# Patient Record
Sex: MALE | Race: WHITE | NOT HISPANIC OR LATINO | Employment: OTHER | URBAN - METROPOLITAN AREA
[De-identification: names, ages, dates, MRNs, and addresses within clinical notes are randomized per-mention and may not be internally consistent; named-entity substitution may affect disease eponyms.]

---

## 2017-01-16 RX ORDER — LEVOTHYROXINE SODIUM 137 UG/1
TABLET ORAL
Qty: 90 TABLET | Refills: 1 | Status: SHIPPED | OUTPATIENT
Start: 2017-01-16 | End: 2017-07-07 | Stop reason: SDUPTHER

## 2017-01-17 DIAGNOSIS — E03.9 HYPOTHYROIDISM, UNSPECIFIED TYPE: ICD-10-CM

## 2017-01-17 DIAGNOSIS — E78.5 HYPERLIPIDEMIA, UNSPECIFIED HYPERLIPIDEMIA TYPE: Primary | ICD-10-CM

## 2017-01-18 ENCOUNTER — RESULTS ENCOUNTER (OUTPATIENT)
Dept: INTERNAL MEDICINE | Facility: CLINIC | Age: 72
End: 2017-01-18

## 2017-01-18 DIAGNOSIS — E78.5 HYPERLIPIDEMIA, UNSPECIFIED HYPERLIPIDEMIA TYPE: ICD-10-CM

## 2017-01-18 DIAGNOSIS — E03.9 HYPOTHYROIDISM, UNSPECIFIED TYPE: ICD-10-CM

## 2017-01-18 LAB
ALBUMIN SERPL-MCNC: 4.2 G/DL (ref 3.5–5.2)
ALBUMIN/GLOB SERPL: 1.8 G/DL
ALP SERPL-CCNC: 71 U/L (ref 39–117)
ALT SERPL-CCNC: 17 U/L (ref 1–41)
AST SERPL-CCNC: 13 U/L (ref 1–40)
BILIRUB SERPL-MCNC: 0.5 MG/DL (ref 0.1–1.2)
BUN SERPL-MCNC: 18 MG/DL (ref 8–23)
BUN/CREAT SERPL: 16.8 (ref 7–25)
CALCIUM SERPL-MCNC: 9.5 MG/DL (ref 8.6–10.5)
CHLORIDE SERPL-SCNC: 103 MMOL/L (ref 98–107)
CHOLEST SERPL-MCNC: 146 MG/DL (ref 0–200)
CO2 SERPL-SCNC: 26.9 MMOL/L (ref 22–29)
CREAT SERPL-MCNC: 1.07 MG/DL (ref 0.76–1.27)
GLOBULIN SER CALC-MCNC: 2.4 GM/DL
GLUCOSE SERPL-MCNC: 81 MG/DL (ref 65–99)
HDLC SERPL-MCNC: 55 MG/DL (ref 40–60)
LDLC SERPL CALC-MCNC: 68 MG/DL (ref 0–100)
POTASSIUM SERPL-SCNC: 4.6 MMOL/L (ref 3.5–5.2)
PROT SERPL-MCNC: 6.6 G/DL (ref 6–8.5)
SODIUM SERPL-SCNC: 143 MMOL/L (ref 136–145)
T4 FREE SERPL-MCNC: 1.19 NG/DL (ref 0.93–1.7)
TRIGL SERPL-MCNC: 113 MG/DL (ref 0–150)
TSH SERPL DL<=0.005 MIU/L-ACNC: 4.98 MIU/ML (ref 0.27–4.2)
VLDLC SERPL CALC-MCNC: 22.6 MG/DL (ref 5–40)

## 2017-01-25 ENCOUNTER — OFFICE VISIT (OUTPATIENT)
Dept: INTERNAL MEDICINE | Facility: CLINIC | Age: 72
End: 2017-01-25

## 2017-01-25 VITALS
OXYGEN SATURATION: 98 % | SYSTOLIC BLOOD PRESSURE: 138 MMHG | WEIGHT: 242 LBS | HEART RATE: 69 BPM | DIASTOLIC BLOOD PRESSURE: 88 MMHG | HEIGHT: 68 IN | BODY MASS INDEX: 36.68 KG/M2

## 2017-01-25 DIAGNOSIS — R09.89 LABILE HYPERTENSION: Primary | ICD-10-CM

## 2017-01-25 DIAGNOSIS — E78.00 PURE HYPERCHOLESTEROLEMIA: ICD-10-CM

## 2017-01-25 DIAGNOSIS — E03.9 ACQUIRED HYPOTHYROIDISM: ICD-10-CM

## 2017-01-25 DIAGNOSIS — E04.1 THYROID NODULE: ICD-10-CM

## 2017-01-25 PROCEDURE — 99214 OFFICE O/P EST MOD 30 MIN: CPT | Performed by: INTERNAL MEDICINE

## 2017-01-25 NOTE — MR AVS SNAPSHOT
Javier San   1/25/2017 1:00 PM   Office Visit    Dept Phone:  [phone]   Encounter #:  [id number]    Provider:  Lainey Gaffney MD   Department:  [facility] INTERNAL MEDICINE                Your Full Care Plan              Today's Medication Changes          These changes are accurate as of: 1/25/17  1:24 PM.  If you have any questions, ask your nurse or doctor.               Medication(s)that have changed:     levothyroxine 137 MCG tablet   Commonly known as:  SYNTHROID, LEVOTHROID   TAKE ONE TABLET BY MOUTH DAILY   What changed:  Another medication with the same name was removed. Continue taking this medication, and follow the directions you see here.                  Your Updated Medication List          This list is accurate as of: 1/25/17  1:24 PM.  Always use your most recent med list.                acetaminophen 325 MG tablet   Commonly known as:  TYLENOL       * albuterol 108 (90 BASE) MCG/ACT inhaler   Commonly known as:  PROVENTIL HFA;VENTOLIN HFA       * albuterol 1.25 MG/3ML nebulizer solution   Commonly known as:  ACCUNEB       atorvastatin 10 MG tablet   Commonly known as:  LIPITOR   Take 1 tablet by mouth Daily.       azelastine 0.1 % nasal spray   Commonly known as:  ASTELIN       doxycycline 100 MG tablet   Commonly known as:  VIBRAMYICN   Take 1 tablet by mouth 2 (Two) Times a Day.       fluticasone 50 MCG/ACT nasal spray   Commonly known as:  FLONASE       levothyroxine 137 MCG tablet   Commonly known as:  SYNTHROID, LEVOTHROID   TAKE ONE TABLET BY MOUTH DAILY       losartan 50 MG tablet   Commonly known as:  COZAAR       mometasone-formoterol 100-5 MCG/ACT inhaler   Commonly known as:  DULERA 100   Inhale 2 puffs 2 (Two) Times a Day.       montelukast 10 MG tablet   Commonly known as:  SINGULAIR       MULTIPLE VITAMINS PO       predniSONE 10 MG tablet   Commonly known as:  DELTASONE   6 tabs po qd x 3 days, 5 tabs po x 3d, 4 tabs po qd x 3d,  3 tabs po qd x 3 d, 2 tabs po qd x 3 days, 1 tab po qd x 3 days       sildenafil 25 MG tablet   Commonly known as:  VIAGRA       tiZANidine 4 MG tablet   Commonly known as:  ZANAFLEX       vitamin C 250 MG tablet   Commonly known as:  ASCORBIC ACID       * Notice:  This list has 2 medication(s) that are the same as other medications prescribed for you. Read the directions carefully, and ask your doctor or other care provider to review them with you.            You Were Diagnosed With        Codes Comments    Labile hypertension    -  Primary ICD-10-CM: I10  ICD-9-CM: 401.9     Pure hyperglyceridemia     ICD-10-CM: E78.1  ICD-9-CM: 272.1     Acquired hypothyroidism     ICD-10-CM: E03.9  ICD-9-CM: 244.9     Thyroid nodule     ICD-10-CM: E04.1  ICD-9-CM: 241.0       Instructions     None    Patient Instructions History      Upcoming Appointments     Visit Type Date Time Department    OFFICE VISIT 1/25/2017  1:00 PM [facility]    PACEMAKER ICD - IN OFFICE 2/8/2017 12:30 PM [facility]    FOLLOW UP 2/15/2017  1:30 PM [facility]    CT [facility] CHEST WO CONTRAST 3/6/2017 11:00 AM  [facility]    [facility] 4/20/2017 10:10 AM [facility]    OFFICE VISIT 4/25/2017  1:00 PM [facility]      MyChart Signup     Our records indicate that you have an active [employer] account.    You can view your After Visit Summary by going to [employer] and logging in with your [employer] username and password.  If you don't have a [employer] username and password but a parent or guardian has access to your record, the parent or guardian should login with their own [employer] username and password and access your record to view the After Visit Summary.    If you have questions, you can email [email or web address] or call [phone] to talk to our [employer] staff.  Remember, [employer] is NOT to be used for urgent needs.  For medical emergencies, dial 911.               Other Info from Your Visit     "       Your Appointments     Feb 08, 2017 12:30 PM EST   PACEMAKER IN OFFICE with GI VIEYRA [facility] CARDIOLOGY (--)    [address]  [facility] [address]   [phone]            Feb 15, 2017  1:30 PM EST   Follow Up with Jono Madden MD   [facility] CARDIOLOGY (--)    [address]  [facility] [id number] [phone]           Arrive 15 minutes prior to appointment.            Mar 06, 2017 11:00 AM EST   CT gary chest wo contrast with GARY [facility] CT 1   [facility]    [id number] [place] [id number] [phone]           Bring current list of meds Please arrive 15 minutes prior to exam            Apr 20, 2017 10:10 AM EDT   [facility] with [facility] INTERNAL MEDICINE (--)    [address]  [place] [facility] [address]   [phone]            Apr 25, 2017  1:00 PM EDT   Office Visit with Lainey Gaffney MD   [facility] INTERNAL MEDICINE (--)    [address]  [facility] [id number] [phone]           Arrive 15 minutes prior to appointment.              Allergies     No Known Allergies      Reason for Visit     Hypertension     Hyperlipidemia     Hypothyroidism           Vital Signs     Blood Pressure Pulse Height Weight Oxygen Saturation Body Mass Index    138/88 69 68\" (172.7 cm) 242 lb (110 kg) 98% 36.8 kg/m2    Smoking Status                   Former Smoker           Problems and Diagnoses Noted     High cholesterol or triglycerides    Underactive thyroid    Labile hypertension    Thyroid nodule        "

## 2017-01-25 NOTE — PROGRESS NOTES
Subjective     Javier San is a 71 y.o. male who presents with   Chief Complaint   Patient presents with   • Hypertension   • Hyperlipidemia   • Hypothyroidism       History of Present Illness     HTN. Fair but variable control.    HLD. Excellent control with atorvastatin.   Hypothyroidism.  Slight increase in TSH on levothyroxine 137.    Thyroid nodule.  Seen on CT chest.  Discussed checking US to evaluate.         Review of Systems   Respiratory: Positive for cough.    Cardiovascular: Negative for chest pain.       The following portions of the patient's history were reviewed and updated as appropriate: allergies, current medications and problem list.    Patient Active Problem List    Diagnosis Date Noted   • Urinary retention 12/07/2016   • Hyperlipemia 10/27/2016   • Thyroid nodule 10/26/2016     Note Last Updated: 10/27/2016     Noted on CT chest 3/2016.  Stable 9/2016     • AV block, Mobitz II 06/17/2016   • History of melanoma 04/15/2016     Note Last Updated: 4/15/2016     Times two.       • Atopic rhinitis 03/01/2016     Note Last Updated: 3/1/2016     Impression: 03/24/2014 - Refill Flonase and add OTC Clariten to his Singular.;      • Arthritis 03/01/2016   • Asthma 03/01/2016   • Chronic bronchitis 03/01/2016   • Degeneration of intervertebral disc of lumbar region 03/01/2016   • Labile hypertension 03/01/2016   • Hypothyroidism 03/01/2016     Note Last Updated: 3/1/2016     Impression: 03/24/2014 - Controlled with levothyroxine;      • Impaired fasting glucose 03/01/2016   • Lumbar radiculopathy 03/01/2016   • Spinal stenosis of lumbar region 03/01/2016   • Lung nodule 03/01/2016     Note Last Updated: 3/1/2016     Description: by CT of the chest 2/9/15, recheck 8/2015     • Obstructive sleep apnea syndrome 03/01/2016     Note Last Updated: 3/1/2016     Description: Patient is maintained on BIPAP.         Current Outpatient Prescriptions on File Prior to Visit   Medication Sig Dispense Refill   •  acetaminophen (TYLENOL) 325 MG tablet Take 650 mg by mouth Every 6 (Six) Hours As Needed for mild pain (1-3).     • albuterol (ACCUNEB) 1.25 MG/3ML nebulizer solution Take 1 ampule by nebulization Every 6 (Six) Hours As Needed for wheezing.     • albuterol (PROVENTIL HFA;VENTOLIN HFA) 108 (90 BASE) MCG/ACT inhaler Inhale 2 puffs Every 6 (Six) Hours As Needed. ProAir  (90 Base) MCG/ACT Inhalation Aerosol Solution; Patient Sig: ProAir  (90 Base) MCG/ACT Inhalation Aerosol Solution INHALE 1 TO 2 PUFFS EVERY 4 TO 6 HOURS AS NEEDED.; 0; 29-Sep-2014; Active     • atorvastatin (LIPITOR) 10 MG tablet Take 1 tablet by mouth Daily. (Patient taking differently: Take 10 mg by mouth Every Evening.) 90 tablet 3   • azelastine (ASTELIN) 0.1 % nasal spray 1 spray into each nostril 2 (Two) Times a Day. Use 1 to 2 sprays     • doxycycline (VIBRAMYICN) 100 MG tablet Take 1 tablet by mouth 2 (Two) Times a Day. 20 tablet 0   • fluticasone (FLONASE) 50 MCG/ACT nasal spray 2 sprays into each nostril daily.     • levothyroxine (SYNTHROID, LEVOTHROID) 137 MCG tablet TAKE ONE TABLET BY MOUTH DAILY 90 tablet 1   • losartan (COZAAR) 50 MG tablet Take 25 mg by mouth 2 (Two) Times a Day. Takes 1/2 50mg tab     • mometasone-formoterol (DULERA 100) 100-5 MCG/ACT inhaler Inhale 2 puffs 2 (Two) Times a Day.  11   • montelukast (SINGULAIR) 10 MG tablet Take 1 tablet by mouth daily.     • MULTIPLE VITAMINS PO Take 1 tablet/day by mouth Daily.     • predniSONE (DELTASONE) 10 MG tablet 6 tabs po qd x 3 days, 5 tabs po x 3d, 4 tabs po qd x 3d, 3 tabs po qd x 3 d, 2 tabs po qd x 3 days, 1 tab po qd x 3 days 63 tablet 0   • sildenafil (VIAGRA) 25 MG tablet Take 25 mg by mouth daily as needed for erectile dysfunction.     • tiZANidine (ZANAFLEX) 4 MG tablet Take 4 mg by mouth Every Evening.     • vitamin C (ASCORBIC ACID) 250 MG tablet Take 250 mg by mouth daily.     • [DISCONTINUED] levothyroxine (SYNTHROID, LEVOTHROID) 137 MCG tablet Take  "137 mcg by mouth Every Morning.       No current facility-administered medications on file prior to visit.        Objective     Visit Vitals   • /88   • Pulse 69   • Ht 68\" (172.7 cm)   • Wt 242 lb (110 kg)   • SpO2 98%   • BMI 36.8 kg/m2       Physical Exam   Constitutional: He is oriented to person, place, and time. He appears well-developed and well-nourished.   HENT:   Head: Atraumatic.   Cardiovascular: Normal rate, regular rhythm and normal heart sounds.    Pulmonary/Chest: Effort normal and breath sounds normal.   Neurological: He is alert and oriented to person, place, and time.   Skin: Skin is warm and dry.   Psychiatric: He has a normal mood and affect.       Assessment/Plan   Javier was seen today for hypertension, hyperlipidemia and hypothyroidism.    Diagnoses and all orders for this visit:    Labile hypertension    Pure hyperglyceridemia    Acquired hypothyroidism    Thyroid nodule  -     US Thyroid; Future        Discussion  HTN. Continue current regimen.  HLD. Continue atorvastatin.   Hypothyroidism. Continue levothyroxine at current dose.   Thyroid nodule.  Check US to follow on incidentally found nodule on CT.         Future Appointments  Date Time Provider Department Center   2/8/2017 12:30 PM GI VIEYRA [facility] None   2/15/2017 1:30 PM Jono Madden MD [facility] None   3/6/2017 11:00 AM [facility] CT 1  [facility] CT [facility] [place]   4/20/2017 10:10 AM [facility] None   4/25/2017 1:00 PM Lainey Gaffney MD [facility] None         "

## 2017-01-30 RX ORDER — LOSARTAN POTASSIUM 50 MG/1
TABLET ORAL
Qty: 90 TABLET | Refills: 1 | Status: SHIPPED | OUTPATIENT
Start: 2017-01-30 | End: 2017-02-15 | Stop reason: ALTCHOICE

## 2017-02-01 ENCOUNTER — HOSPITAL ENCOUNTER (OUTPATIENT)
Dept: ULTRASOUND IMAGING | Facility: HOSPITAL | Age: 72
Discharge: HOME | End: 2017-02-01
Admitting: INTERNAL MEDICINE

## 2017-02-01 DIAGNOSIS — E04.1 THYROID NODULE: ICD-10-CM

## 2017-02-01 PROCEDURE — 76536 US EXAM OF HEAD AND NECK: CPT

## 2017-02-03 DIAGNOSIS — E04.1 THYROID NODULE: Primary | ICD-10-CM

## 2017-02-08 ENCOUNTER — CLINICAL SUPPORT NO REQUIREMENTS (OUTPATIENT)
Dept: CARDIOLOGY | Facility: CLINIC | Age: 72
End: 2017-02-08

## 2017-02-08 DIAGNOSIS — I44.2 AV BLOCK, 3RD DEGREE (HCC): Primary | ICD-10-CM

## 2017-02-08 PROCEDURE — 93280 PM DEVICE PROGR EVAL DUAL: CPT | Performed by: INTERNAL MEDICINE

## 2017-02-15 ENCOUNTER — OFFICE VISIT (OUTPATIENT)
Dept: CARDIOLOGY | Facility: CLINIC | Age: 72
End: 2017-02-15

## 2017-02-15 VITALS
WEIGHT: 241 LBS | DIASTOLIC BLOOD PRESSURE: 76 MMHG | BODY MASS INDEX: 36.53 KG/M2 | SYSTOLIC BLOOD PRESSURE: 110 MMHG | HEIGHT: 68 IN | HEART RATE: 60 BPM

## 2017-02-15 DIAGNOSIS — R09.89 LABILE HYPERTENSION: ICD-10-CM

## 2017-02-15 DIAGNOSIS — I44.1 AV BLOCK, MOBITZ II: Primary | ICD-10-CM

## 2017-02-15 PROCEDURE — 99213 OFFICE O/P EST LOW 20 MIN: CPT | Performed by: INTERNAL MEDICINE

## 2017-02-15 NOTE — PROGRESS NOTES
Date of Office Visit: 02/15/2017  Encounter Provider: Jono Madden MD  Place of Service: [facility] CARDIOLOGY  Patient Name: Javier San  :1945    Chief Complaint   Patient presents with   • Hypertension   :     HPI: Javier San is a 71 y.o. male who presents today to follow up. He has a history of near syncope and labile hypertension with orthostasis.  In 2016, he presented to the ER with bradycardia/Mobitz II heart block, and had a dual chamber [employer] pacemaker placed. He has not had syncope, although he does still have intermittent lightheadedness (extremely brief, seconds long episodes). He also has very labile blood pressures, which can be high or low.       He may need an implanted pain pump and is wondering if it will interfere with his pacer.    Past Medical History   Diagnosis Date   • Abnormal blood chemistry    • Abnormal liver enzymes    • Acute bacterial prostatitis    • Anemia    • Anxiety 2014     Warning,Lainey   • Arthritis    • Asthma 3/1/2016   • Basal cell carcinoma    • Chronic bronchitis 3/1/2016   • Gynecomastia    • H/O degenerative disc disease    • H/O hernia repair    • Hypertension 3/1/2016     Impression: 2015 - .  Impression: 2014 - Controlled with HCTZ.;    • Hypokalemia    • Hypothyroidism    • Labile hypertension 2016     with periods of orthostasis and near syncope   • Left bundle branch block    • Low back pain    • Lumbar radiculopathy    • Lumbar stenosis    • Lung nodule      CT of chest 2/9/15   • Malignant neoplasm of cheek 3/1/2016     Description: - Basal Cell   • Mobitz (type) II atrioventricular block      s/p [employer] dual chamber PPM    • Obstructive sleep apnea    • Rupture of kidney      in high school football   • Skin cancer of nose      Basal Cell   • Stricture of ureter      WITH HYDRONEPHROSIS   • Testicular pain    • Venous insufficiency    • Vitamin D deficiency         Past Surgical History   Procedure Laterality Date   • Back surgery       L4-L5 with Dr. Solomon   • Colon surgery       Complete colonoscopy- Dr. knox/DAVID   • Hernia repair     • Replacement total knee bilateral       right in 2008 and left in 2005   • Nasal septum surgery     • Nose surgery       deviated septum   • Total shoulder replacement Bilateral      left in 2011 and right in 2013   • Prostate surgery       transurethral destruction prostate tissue   • Tonsillectomy     • Cystoscopy transurethral resection of prostate     • Cardiac electrophysiology procedure N/A 6/17/2016     Procedure: Pacemaker DC new  [place];  Surgeon: Zachary Narayanan MD;  Location: [facility] INVASIVE LOCATION;  Service:    • Cystoscopy transurethral resection of prostate N/A 12/7/2016     Procedure: CYSTOSCOPY TRANSURETHRAL RESECTION OF PROSTATE;  Surgeon: Law Dee MD;  Location: [facility] OR;  Service:        Social History     Social History   • Marital status:      Spouse name: N/A   • Number of children: N/A   • Years of education: N/A     Occupational History   • Not on file.     Social History Main Topics   • Smoking status: Former Smoker     Packs/day: 1.00     Types: Cigarettes     Start date: 6/16/1964     Quit date: 6/16/1984   • Smokeless tobacco: Never Used      Comment: 20 year smoker   • Alcohol use Yes      Comment: social, 4 beers a year   • Drug use: No   • Sexual activity: Defer     Other Topics Concern   • Not on file     Social History Narrative       Family History   Problem Relation Age of Onset   • Alcohol abuse Mother    • Cancer Mother      skin   • Stroke Mother    • Alcohol abuse Father    • Emphysema Father    • COPD Father    • Cancer Father      skin       Review of Systems   Cardiovascular: Positive for leg swelling.   Musculoskeletal: Positive for back pain.   All other systems reviewed and are negative.      No Known Allergies      Current Outpatient Prescriptions:   •   "acetaminophen (TYLENOL) 325 MG tablet, Take 650 mg by mouth Every 6 (Six) Hours As Needed for mild pain (1-3)., Disp: , Rfl:   •  albuterol (ACCUNEB) 1.25 MG/3ML nebulizer solution, Take 1 ampule by nebulization Every 6 (Six) Hours As Needed for wheezing., Disp: , Rfl:   •  albuterol (PROVENTIL HFA;VENTOLIN HFA) 108 (90 BASE) MCG/ACT inhaler, Inhale 2 puffs Every 6 (Six) Hours As Needed. ProAir  (90 Base) MCG/ACT Inhalation Aerosol Solution; Patient Sig: ProAir  (90 Base) MCG/ACT Inhalation Aerosol Solution INHALE 1 TO 2 PUFFS EVERY 4 TO 6 HOURS AS NEEDED.; 0; 29-Sep-2014; Active, Disp: , Rfl:   •  atorvastatin (LIPITOR) 10 MG tablet, Take 1 tablet by mouth Daily. (Patient taking differently: Take 10 mg by mouth Every Evening.), Disp: 90 tablet, Rfl: 3  •  azelastine (ASTELIN) 0.1 % nasal spray, 1 spray into each nostril 2 (Two) Times a Day. Use 1 to 2 sprays, Disp: , Rfl:   •  fluticasone (FLONASE) 50 MCG/ACT nasal spray, 2 sprays into each nostril daily., Disp: , Rfl:   •  Fluticasone Furoate-Vilanterol (BREO ELLIPTA IN), Inhale., Disp: , Rfl:   •  levothyroxine (SYNTHROID, LEVOTHROID) 137 MCG tablet, TAKE ONE TABLET BY MOUTH DAILY, Disp: 90 tablet, Rfl: 1  •  losartan (COZAAR) 50 MG tablet, Take 25 mg by mouth 2 (Two) Times a Day. Takes 1/2 50mg tab, Disp: , Rfl:   •  montelukast (SINGULAIR) 10 MG tablet, Take 1 tablet by mouth daily., Disp: , Rfl:   •  MULTIPLE VITAMINS PO, Take 1 tablet/day by mouth Daily., Disp: , Rfl:   •  sildenafil (VIAGRA) 25 MG tablet, Take 25 mg by mouth daily as needed for erectile dysfunction., Disp: , Rfl:   •  tiZANidine (ZANAFLEX) 4 MG tablet, Take 4 mg by mouth Every Evening., Disp: , Rfl:   •  vitamin C (ASCORBIC ACID) 250 MG tablet, Take 250 mg by mouth daily., Disp: , Rfl:      Objective:     Vitals:    02/15/17 1346   BP: 110/76   Pulse: 60   Weight: 241 lb (109 kg)   Height: 68\" (172.7 cm)     Body mass index is 36.64 kg/(m^2).    Physical Exam   Constitutional: " He is oriented to person, place, and time.   Obese   HENT:   Head: Normocephalic.   Nose: Nose normal.   Mouth/Throat: Oropharynx is clear and moist.   Eyes: Conjunctivae and EOM are normal. Pupils are equal, round, and reactive to light.   Neck: Normal range of motion.   Cannot assess for JVD due to habitus   Cardiovascular: Normal rate, regular rhythm, normal heart sounds and intact distal pulses.    No murmur heard.  Pulmonary/Chest: Effort normal.   Abdominal: Soft.   Obesity limits abdominal exam   Musculoskeletal: Normal range of motion. He exhibits no edema.   Neurological: He is alert and oriented to person, place, and time. No cranial nerve deficit.   Skin: Skin is warm and dry. No rash noted.   Psychiatric: He has a normal mood and affect. His behavior is normal. Judgment and thought content normal.   Vitals reviewed.      Procedures      Assessment:       Diagnosis Plan   1. AV block, Mobitz II     2. Labile hypertension            Plan:       1.  He has a history of high grade AVB and is now s/p dual chamber PPM placement.  He paces in the LV 90% of the time.  He is doing well.   I asked him to see which company manufactures the planned pain pump and we'll make sure there's no interaction with his device.    2.  He has hypertension with lability.  His BP is perfect today.  He hasn't had syncope so we'll keep things where they are for now.      Sincerely,       Jono Madden MD

## 2017-03-06 ENCOUNTER — HOSPITAL ENCOUNTER (OUTPATIENT)
Dept: CT IMAGING | Facility: HOSPITAL | Age: 72
Discharge: HOME | End: 2017-03-06
Attending: INTERNAL MEDICINE | Admitting: INTERNAL MEDICINE

## 2017-03-06 DIAGNOSIS — R91.8 PULMONARY NODULES: ICD-10-CM

## 2017-03-06 PROCEDURE — 71250 CT THORAX DX C-: CPT

## 2017-03-07 ENCOUNTER — OFFICE VISIT (OUTPATIENT)
Dept: INTERNAL MEDICINE | Facility: CLINIC | Age: 72
End: 2017-03-07

## 2017-03-07 VITALS
WEIGHT: 239 LBS | HEIGHT: 68 IN | SYSTOLIC BLOOD PRESSURE: 110 MMHG | TEMPERATURE: 97.8 F | BODY MASS INDEX: 36.22 KG/M2 | HEART RATE: 70 BPM | DIASTOLIC BLOOD PRESSURE: 80 MMHG | OXYGEN SATURATION: 98 %

## 2017-03-07 DIAGNOSIS — J01.90 ACUTE SINUSITIS, RECURRENCE NOT SPECIFIED, UNSPECIFIED LOCATION: Primary | ICD-10-CM

## 2017-03-07 DIAGNOSIS — J42 CHRONIC BRONCHITIS, UNSPECIFIED CHRONIC BRONCHITIS TYPE (HCC): ICD-10-CM

## 2017-03-07 DIAGNOSIS — J45.20 MILD INTERMITTENT ASTHMA WITHOUT COMPLICATION: ICD-10-CM

## 2017-03-07 PROCEDURE — 99214 OFFICE O/P EST MOD 30 MIN: CPT | Performed by: INTERNAL MEDICINE

## 2017-03-07 RX ORDER — AMOXICILLIN 500 MG/1
CAPSULE ORAL
COMMUNITY
Start: 2017-03-04 | End: 2017-03-07

## 2017-03-07 RX ORDER — LEVOFLOXACIN 500 MG/1
500 TABLET, FILM COATED ORAL DAILY
Qty: 10 TABLET | Refills: 0 | Status: SHIPPED | OUTPATIENT
Start: 2017-03-07 | End: 2017-04-25

## 2017-03-07 NOTE — PROGRESS NOTES
Subjective     Javier San is a 71 y.o. male who presents with   Chief Complaint   Patient presents with   • Conjunctivitis     Follow up [facility]   • Nasal Congestion   • Sore Throat       Conjunctivitis    Associated symptoms include congestion, ear pain, sore throat and cough. Pertinent negatives include no abdominal pain, no diarrhea, no vomiting, no ear discharge and no headaches.   Sore Throat    This is a new problem. The current episode started in the past 7 days. The problem has been gradually worsening. Neither side of throat is experiencing more pain than the other. There has been no fever. The fever has been present for 3 to 4 days. The pain is at a severity of 3/10. Associated symptoms include congestion, coughing, ear pain, a hoarse voice, a plugged ear sensation and trouble swallowing. Pertinent negatives include no abdominal pain, diarrhea, drooling, ear discharge, headaches, shortness of breath or vomiting. He has had no exposure to strep or mono.        Started with sore throat last last week.  Eye drainage started Friday.  Ear pain started on Saturday.  Cough is constant since November and never resolves. Doxycycline from allergist in January.  Due to see pulmonologist this month.  Saw ENT as well and plans to do a CT.  Moderate severity of problem which is progressive.      Review of Systems   HENT: Positive for congestion, ear pain, hoarse voice, sore throat and trouble swallowing. Negative for drooling and ear discharge.    Respiratory: Positive for cough. Negative for shortness of breath.    Gastrointestinal: Negative for abdominal pain, diarrhea and vomiting.   Neurological: Negative for headaches.       The following portions of the patient's history were reviewed and updated as appropriate: allergies, current medications and problem list.    Patient Active Problem List    Diagnosis Date Noted   • Acute sinus infection 03/07/2017   • Urinary retention 12/07/2016   • Hyperlipemia 10/27/2016    • Thyroid nodule 10/26/2016     Note Last Updated: 10/27/2016     Noted on CT chest 3/2016.  Stable 9/2016     • AV block, Mobitz II 06/17/2016   • History of melanoma 04/15/2016     Note Last Updated: 4/15/2016     Times two.       • Atopic rhinitis 03/01/2016     Note Last Updated: 3/1/2016     Impression: 03/24/2014 - Refill Flonase and add OTC Clariten to his Singular.;      • Arthritis 03/01/2016   • Asthma 03/01/2016   • Chronic bronchitis 03/01/2016   • Degeneration of intervertebral disc of lumbar region 03/01/2016   • Labile hypertension 03/01/2016   • Hypothyroidism 03/01/2016     Note Last Updated: 3/1/2016     Impression: 03/24/2014 - Controlled with levothyroxine;      • Impaired fasting glucose 03/01/2016   • Lumbar radiculopathy 03/01/2016   • Spinal stenosis of lumbar region 03/01/2016   • Lung nodule 03/01/2016     Note Last Updated: 3/1/2016     Description: by CT of the chest 2/9/15, recheck 8/2015     • Obstructive sleep apnea syndrome 03/01/2016     Note Last Updated: 3/1/2016     Description: Patient is maintained on BIPAP.         Current Outpatient Prescriptions on File Prior to Visit   Medication Sig Dispense Refill   • acetaminophen (TYLENOL) 325 MG tablet Take 650 mg by mouth Every 6 (Six) Hours As Needed for mild pain (1-3).     • albuterol (ACCUNEB) 1.25 MG/3ML nebulizer solution Take 1 ampule by nebulization Every 6 (Six) Hours As Needed for wheezing.     • albuterol (PROVENTIL HFA;VENTOLIN HFA) 108 (90 BASE) MCG/ACT inhaler Inhale 2 puffs Every 6 (Six) Hours As Needed. ProAir  (90 Base) MCG/ACT Inhalation Aerosol Solution; Patient Sig: ProAir  (90 Base) MCG/ACT Inhalation Aerosol Solution INHALE 1 TO 2 PUFFS EVERY 4 TO 6 HOURS AS NEEDED.; 0; 29-Sep-2014; Active     • amoxicillin (AMOXIL) 500 MG tablet Take 1 tablet by mouth 2 (Two) Times a Day for 7 days. 14 tablet 0   • atorvastatin (LIPITOR) 10 MG tablet Take 1 tablet by mouth Daily. (Patient taking differently: Take  "10 mg by mouth Every Evening.) 90 tablet 3   • azelastine (ASTELIN) 0.1 % nasal spray 1 spray into each nostril 2 (Two) Times a Day. Use 1 to 2 sprays     • fluticasone (FLONASE) 50 MCG/ACT nasal spray 2 sprays into each nostril daily.     • Fluticasone Furoate-Vilanterol (BREO ELLIPTA IN) Inhale.     • levothyroxine (SYNTHROID, LEVOTHROID) 137 MCG tablet TAKE ONE TABLET BY MOUTH DAILY 90 tablet 1   • losartan (COZAAR) 50 MG tablet Take 25 mg by mouth 2 (Two) Times a Day. Takes 1/2 50mg tab     • montelukast (SINGULAIR) 10 MG tablet Take 1 tablet by mouth daily.     • MULTIPLE VITAMINS PO Take 1 tablet/day by mouth Daily.     • sildenafil (VIAGRA) 25 MG tablet Take 25 mg by mouth daily as needed for erectile dysfunction.     • tiZANidine (ZANAFLEX) 4 MG tablet Take 4 mg by mouth Every Evening.     • trimethoprim-polymyxin b (POLYTRIM) 08853-1.1 UNIT/ML-% ophthalmic solution Administer 1 drop into the left eye Every 6 (Six) Hours for 3 days. 10 mL 0   • vitamin C (ASCORBIC ACID) 250 MG tablet Take 250 mg by mouth daily.       No current facility-administered medications on file prior to visit.        Objective     Visit Vitals   • /80   • Pulse 70   • Temp 97.8 °F (36.6 °C)   • Ht 68\" (172.7 cm)   • Wt 239 lb (108 kg)   • SpO2 98%   • BMI 36.34 kg/m2       Physical Exam   Constitutional: He is oriented to person, place, and time. He appears well-developed and well-nourished.   HENT:   Head: Atraumatic.   Right Ear: Hearing normal.   Left Ear: Hearing normal.   Mouth/Throat: No oropharyngeal exudate or posterior oropharyngeal erythema.   Bilateral TMs are bulging   Cardiovascular: Normal rate, regular rhythm and normal heart sounds.    Pulmonary/Chest: Effort normal and breath sounds normal.   Neurological: He is alert and oriented to person, place, and time.   Skin: Skin is warm and dry.   Psychiatric: He has a normal mood and affect.       Assessment/Plan   Javier was seen today for conjunctivitis, nasal " congestion and sore throat.    Diagnoses and all orders for this visit:    Acute sinusitis, recurrence not specified, unspecified location    Mild intermittent asthma without complication    Chronic bronchitis, unspecified chronic bronchitis type    Other orders  -     levoFLOXacin (LEVAQUIN) 500 MG tablet; Take 1 tablet by mouth Daily.        Discussion  Patient with chronic bronchitis and asthma presents with acute sinus infection.  Rx for antibiotics are called in.  The patient is encouraged to take with OTC Mucinex DM.  Levaquin chosen because she is a amoxil failure.  Let me know if not feeling better over the next 3 days or if there is any change in symptoms.  He has f/u with Dr. Garnica who may want to consider bronchoscopy.  Dr. Hernandez has ordered a CT of the sinuses.             Future Appointments  Date Time Provider Department Center   4/20/2017 10:10 AM [facility] None   4/25/2017 1:00 PM MD CHANTAL Esparza [facility] None   5/30/2017 12:10 AM [facility], [facility] None   2/15/2018 10:45 AM MD CHANTAL Diaz [facility] None

## 2017-04-03 ENCOUNTER — TELEPHONE (OUTPATIENT)
Dept: CARDIOLOGY | Facility: CLINIC | Age: 72
End: 2017-04-03

## 2017-04-03 NOTE — TELEPHONE ENCOUNTER
Date of Encounter:   4/3/2017  Encounter Provider:  Jono Madden MD  Place of Service:  [facility]  Patient Name: Javier San  :  1945        Attention:  Roxane      Dear Dr. Tavarez,    Mr. San has a history of heart block and has a dual chamber pacemaker ([employer]).  He does not have a history of CAD or CHF.  He has normal systolic function.  He has labile hypertension.  He is at low risk of major adverse cardiovascular events during intermediate risk surgery.     Please contact our office with any questions or concerns. As always, it has been a pleasure to participate in your patient's care.    It is unclear to me if there is any adverse interaction between the [employer] stimulator and a [employer] device.  If there were to be a serious interaction, we could always change his generator to a [employer] generator if needed.      Jono Madden MD  [facility]

## 2017-04-03 NOTE — TELEPHONE ENCOUNTER
Roxane from [facility] called and requested a cardiac clearance from Dr. Madden for a spinal cord stimulator placement. Placement is scheduled for April 24th.     The stimulator is manufactured by [employer]. The implanting physician is Dr. Julius Tavarez.     Pt has a [employer] device and I provided Roxane with the contact information so a BS rep could be present for the procedure, if cleared.     Roxane can be reached at [phone] and her direct fax is [phone]

## 2017-04-19 DIAGNOSIS — E03.9 ACQUIRED HYPOTHYROIDISM: Primary | ICD-10-CM

## 2017-04-19 DIAGNOSIS — E78.00 PURE HYPERCHOLESTEROLEMIA: ICD-10-CM

## 2017-04-20 LAB
ALBUMIN SERPL-MCNC: 4.4 G/DL (ref 3.5–5.2)
ALBUMIN/GLOB SERPL: 2.1 G/DL
ALP SERPL-CCNC: 74 U/L (ref 39–117)
ALT SERPL-CCNC: 26 U/L (ref 1–41)
AST SERPL-CCNC: 18 U/L (ref 1–40)
BASOPHILS # BLD AUTO: 0.03 10*3/MM3 (ref 0–0.2)
BASOPHILS NFR BLD AUTO: 0.5 % (ref 0–1.5)
BILIRUB SERPL-MCNC: 0.6 MG/DL (ref 0.1–1.2)
BUN SERPL-MCNC: 19 MG/DL (ref 8–23)
BUN/CREAT SERPL: 20.4 (ref 7–25)
CALCIUM SERPL-MCNC: 9.7 MG/DL (ref 8.6–10.5)
CHLORIDE SERPL-SCNC: 105 MMOL/L (ref 98–107)
CHOLEST SERPL-MCNC: 162 MG/DL (ref 0–200)
CO2 SERPL-SCNC: 26 MMOL/L (ref 22–29)
CREAT SERPL-MCNC: 0.93 MG/DL (ref 0.76–1.27)
EOSINOPHIL # BLD AUTO: 0.17 10*3/MM3 (ref 0–0.7)
EOSINOPHIL NFR BLD AUTO: 3 % (ref 0.3–6.2)
ERYTHROCYTE [DISTWIDTH] IN BLOOD BY AUTOMATED COUNT: 14.2 % (ref 11.5–14.5)
GLOBULIN SER CALC-MCNC: 2.1 GM/DL
GLUCOSE SERPL-MCNC: 90 MG/DL (ref 65–99)
HCT VFR BLD AUTO: 45.4 % (ref 40.4–52.2)
HDLC SERPL-MCNC: 61 MG/DL (ref 40–60)
HGB BLD-MCNC: 15.1 G/DL (ref 13.7–17.6)
IMM GRANULOCYTES # BLD: 0 10*3/MM3 (ref 0–0.03)
IMM GRANULOCYTES NFR BLD: 0 % (ref 0–0.5)
LDLC SERPL CALC-MCNC: 78 MG/DL (ref 0–100)
LYMPHOCYTES # BLD AUTO: 1.53 10*3/MM3 (ref 0.9–4.8)
LYMPHOCYTES NFR BLD AUTO: 26.7 % (ref 19.6–45.3)
MCH RBC QN AUTO: 31.6 PG (ref 27–32.7)
MCHC RBC AUTO-ENTMCNC: 33.3 G/DL (ref 32.6–36.4)
MCV RBC AUTO: 95 FL (ref 79.8–96.2)
MONOCYTES # BLD AUTO: 0.62 10*3/MM3 (ref 0.2–1.2)
MONOCYTES NFR BLD AUTO: 10.8 % (ref 5–12)
NEUTROPHILS # BLD AUTO: 3.37 10*3/MM3 (ref 1.9–8.1)
NEUTROPHILS NFR BLD AUTO: 59 % (ref 42.7–76)
PLATELET # BLD AUTO: 230 10*3/MM3 (ref 140–500)
POTASSIUM SERPL-SCNC: 4.6 MMOL/L (ref 3.5–5.2)
PROT SERPL-MCNC: 6.5 G/DL (ref 6–8.5)
RBC # BLD AUTO: 4.78 10*6/MM3 (ref 4.6–6)
SODIUM SERPL-SCNC: 145 MMOL/L (ref 136–145)
TRIGL SERPL-MCNC: 114 MG/DL (ref 0–150)
TSH SERPL DL<=0.005 MIU/L-ACNC: 1.78 MIU/ML (ref 0.27–4.2)
VLDLC SERPL CALC-MCNC: 22.8 MG/DL (ref 5–40)
WBC # BLD AUTO: 5.72 10*3/MM3 (ref 4.5–10.7)

## 2017-04-25 ENCOUNTER — OFFICE VISIT (OUTPATIENT)
Dept: INTERNAL MEDICINE | Facility: CLINIC | Age: 72
End: 2017-04-25

## 2017-04-25 VITALS
DIASTOLIC BLOOD PRESSURE: 88 MMHG | BODY MASS INDEX: 37.13 KG/M2 | OXYGEN SATURATION: 97 % | WEIGHT: 245 LBS | HEIGHT: 68 IN | SYSTOLIC BLOOD PRESSURE: 130 MMHG | HEART RATE: 72 BPM

## 2017-04-25 DIAGNOSIS — E03.9 ACQUIRED HYPOTHYROIDISM: ICD-10-CM

## 2017-04-25 DIAGNOSIS — E78.00 PURE HYPERCHOLESTEROLEMIA: ICD-10-CM

## 2017-04-25 DIAGNOSIS — I10 ESSENTIAL HYPERTENSION: Primary | ICD-10-CM

## 2017-04-25 PROCEDURE — 99214 OFFICE O/P EST MOD 30 MIN: CPT | Performed by: INTERNAL MEDICINE

## 2017-04-25 NOTE — PROGRESS NOTES
Subjective     Javier San is a 72 y.o. male who presents with   Chief Complaint   Patient presents with   • Hypertension   • Hyperlipidemia   • Hypothyroidism       History of Present Illness     HTN. Good control with current regimen.    HLD. Excellent control with atorvastatin.   Hypothyroidism. Well controlled with levothyroxine 137.      Review of Systems   Respiratory: Negative.    Cardiovascular: Negative.        The following portions of the patient's history were reviewed and updated as appropriate: allergies, current medications and problem list.    Patient Active Problem List    Diagnosis Date Noted   • Acute sinus infection 03/07/2017   • Urinary retention 12/07/2016   • Hyperlipemia 10/27/2016   • Thyroid nodule 10/26/2016     Note Last Updated: 10/27/2016     Noted on CT chest 3/2016.  Stable 9/2016     • AV block, Mobitz II 06/17/2016   • History of melanoma 04/15/2016     Note Last Updated: 4/15/2016     Times two.       • Atopic rhinitis 03/01/2016     Note Last Updated: 3/1/2016     Impression: 03/24/2014 - Refill Flonase and add OTC Clariten to his Singular.;      • Arthritis 03/01/2016   • Asthma 03/01/2016   • Chronic bronchitis 03/01/2016   • Degeneration of intervertebral disc of lumbar region 03/01/2016   • Essential hypertension 03/01/2016   • Hypothyroidism 03/01/2016     Note Last Updated: 3/1/2016     Impression: 03/24/2014 - Controlled with levothyroxine;      • Impaired fasting glucose 03/01/2016   • Lumbar radiculopathy 03/01/2016   • Spinal stenosis of lumbar region 03/01/2016   • Lung nodule 03/01/2016     Note Last Updated: 3/1/2016     Description: by CT of the chest 2/9/15, recheck 8/2015     • Obstructive sleep apnea syndrome 03/01/2016     Note Last Updated: 3/1/2016     Description: Patient is maintained on BIPAP.         Current Outpatient Prescriptions on File Prior to Visit   Medication Sig Dispense Refill   • acetaminophen (TYLENOL) 325 MG tablet Take 650 mg by mouth  "Every 6 (Six) Hours As Needed for mild pain (1-3).     • albuterol (ACCUNEB) 1.25 MG/3ML nebulizer solution Take 1 ampule by nebulization Every 6 (Six) Hours As Needed for wheezing.     • albuterol (PROVENTIL HFA;VENTOLIN HFA) 108 (90 BASE) MCG/ACT inhaler Inhale 2 puffs Every 6 (Six) Hours As Needed. ProAir  (90 Base) MCG/ACT Inhalation Aerosol Solution; Patient Sig: ProAir  (90 Base) MCG/ACT Inhalation Aerosol Solution INHALE 1 TO 2 PUFFS EVERY 4 TO 6 HOURS AS NEEDED.; 0; 29-Sep-2014; Active     • atorvastatin (LIPITOR) 10 MG tablet Take 1 tablet by mouth Daily. (Patient taking differently: Take 10 mg by mouth Every Evening.) 90 tablet 3   • azelastine (ASTELIN) 0.1 % nasal spray 1 spray into each nostril 2 (Two) Times a Day. Use 1 to 2 sprays     • fluticasone (FLONASE) 50 MCG/ACT nasal spray 2 sprays into each nostril daily.     • Fluticasone Furoate-Vilanterol (BREO ELLIPTA IN) Inhale.     • levothyroxine (SYNTHROID, LEVOTHROID) 137 MCG tablet TAKE ONE TABLET BY MOUTH DAILY 90 tablet 1   • losartan (COZAAR) 50 MG tablet Take 25 mg by mouth 2 (Two) Times a Day. Takes 1/2 50mg tab     • montelukast (SINGULAIR) 10 MG tablet Take 1 tablet by mouth daily.     • MULTIPLE VITAMINS PO Take 1 tablet/day by mouth Daily.     • sildenafil (VIAGRA) 25 MG tablet Take 25 mg by mouth daily as needed for erectile dysfunction.     • tiZANidine (ZANAFLEX) 4 MG tablet Take 4 mg by mouth Every Evening.     • vitamin C (ASCORBIC ACID) 250 MG tablet Take 250 mg by mouth daily.     • [DISCONTINUED] levoFLOXacin (LEVAQUIN) 500 MG tablet Take 1 tablet by mouth Daily. 10 tablet 0     No current facility-administered medications on file prior to visit.        Objective     /88  Pulse 72  Ht 68\" (172.7 cm)  Wt 245 lb (111 kg)  SpO2 97%  BMI 37.25 kg/m2    Physical Exam   Constitutional: He is oriented to person, place, and time. He appears well-developed and well-nourished.   HENT:   Head: Atraumatic. "   Cardiovascular: Normal rate, regular rhythm and normal heart sounds.    Pulmonary/Chest: Effort normal and breath sounds normal.   Neurological: He is alert and oriented to person, place, and time.   Skin: Skin is warm and dry.   Psychiatric: He has a normal mood and affect.       Assessment/Plan   Javier was seen today for hypertension, hyperlipidemia and hypothyroidism.    Diagnoses and all orders for this visit:    Essential hypertension    Pure hypercholesterolemia    Acquired hypothyroidism        Discussion    HTN.  Continue current regimen.  HLD.  Continue atorvastatin.   Hypothyroidism.  Continue levothyroxine.         Future Appointments  Date Time Provider Department Center   5/30/2017 12:10 AM [facility] None   10/23/2017 10:20 AM [facility] None   10/30/2017 1:30 PM MD [facility] None   2/15/2018 10:45 AM Jono Madden MD [facility] None

## 2017-05-26 ENCOUNTER — TRANSCRIBE ORDERS (OUTPATIENT)
Dept: ADMINISTRATIVE | Facility: HOSPITAL | Age: 72
End: 2017-05-26

## 2017-05-26 DIAGNOSIS — R91.1 PULMONARY NODULE: Primary | ICD-10-CM

## 2017-06-04 ENCOUNTER — CLINICAL SUPPORT NO REQUIREMENTS (OUTPATIENT)
Dept: CARDIOLOGY | Facility: CLINIC | Age: 72
End: 2017-06-04

## 2017-06-04 DIAGNOSIS — I44.2 ATRIOVENTRICULAR BLOCK, COMPLETE (HCC): Primary | ICD-10-CM

## 2017-06-04 PROCEDURE — 93294 REM INTERROG EVL PM/LDLS PM: CPT | Performed by: INTERNAL MEDICINE

## 2017-06-04 PROCEDURE — 93296 REM INTERROG EVL PM/IDS: CPT | Performed by: INTERNAL MEDICINE

## 2017-07-07 RX ORDER — LEVOTHYROXINE SODIUM 137 UG/1
TABLET ORAL
Qty: 90 TABLET | Refills: 0 | Status: SHIPPED | OUTPATIENT
Start: 2017-07-07 | End: 2017-08-24 | Stop reason: SDUPTHER

## 2017-07-31 ENCOUNTER — OFFICE VISIT (OUTPATIENT)
Dept: INTERNAL MEDICINE | Facility: CLINIC | Age: 72
End: 2017-07-31

## 2017-07-31 VITALS
WEIGHT: 251 LBS | HEIGHT: 68 IN | DIASTOLIC BLOOD PRESSURE: 98 MMHG | HEART RATE: 72 BPM | OXYGEN SATURATION: 96 % | BODY MASS INDEX: 38.04 KG/M2 | SYSTOLIC BLOOD PRESSURE: 140 MMHG

## 2017-07-31 DIAGNOSIS — R25.1 TREMOR OF RIGHT HAND: Primary | ICD-10-CM

## 2017-07-31 DIAGNOSIS — R26.89 ABNORMALITY OF GAIT DUE TO IMPAIRMENT OF BALANCE: ICD-10-CM

## 2017-07-31 PROBLEM — J01.90 ACUTE SINUS INFECTION: Status: RESOLVED | Noted: 2017-03-07 | Resolved: 2017-07-31

## 2017-07-31 PROCEDURE — 99213 OFFICE O/P EST LOW 20 MIN: CPT | Performed by: INTERNAL MEDICINE

## 2017-07-31 NOTE — PROGRESS NOTES
Subjective     Javier San is a 72 y.o. male who presents with   Chief Complaint   Patient presents with   • Tremors       History of Present Illness     New tremor.  Started one year ago.  Worse with rest.  Increased with arm is down.  Trouble with walking as well for the two months.     Review of Systems   Respiratory: Negative.    Cardiovascular: Negative.    Neurological: Positive for tremors and weakness. Negative for dizziness, seizures, syncope, speech difficulty, light-headedness, numbness and headaches.       The following portions of the patient's history were reviewed and updated as appropriate: allergies, current medications and problem list.    Patient Active Problem List    Diagnosis Date Noted   • Urinary retention 12/07/2016   • Hyperlipemia 10/27/2016   • Thyroid nodule 10/26/2016     Note Last Updated: 10/27/2016     Noted on CT chest 3/2016.  Stable 9/2016     • AV block, Mobitz II 06/17/2016   • History of melanoma 04/15/2016     Note Last Updated: 4/15/2016     Times two.       • Atopic rhinitis 03/01/2016     Note Last Updated: 3/1/2016     Impression: 03/24/2014 - Refill Flonase and add OTC Clariten to his Singular.;      • Arthritis 03/01/2016   • Asthma 03/01/2016   • Chronic bronchitis 03/01/2016   • Degeneration of intervertebral disc of lumbar region 03/01/2016   • Essential hypertension 03/01/2016   • Hypothyroidism 03/01/2016     Note Last Updated: 3/1/2016     Impression: 03/24/2014 - Controlled with levothyroxine;      • Impaired fasting glucose 03/01/2016   • Lumbar radiculopathy 03/01/2016   • Spinal stenosis of lumbar region 03/01/2016   • Lung nodule 03/01/2016     Note Last Updated: 3/1/2016     Description: by CT of the chest 2/9/15, recheck 8/2015     • Obstructive sleep apnea syndrome 03/01/2016     Note Last Updated: 3/1/2016     Description: Patient is maintained on BIPAP.         Current Outpatient Prescriptions on File Prior to Visit   Medication Sig Dispense Refill  "  • acetaminophen (TYLENOL) 325 MG tablet Take 650 mg by mouth Every 6 (Six) Hours As Needed for mild pain (1-3).     • albuterol (ACCUNEB) 1.25 MG/3ML nebulizer solution Take 1 ampule by nebulization Every 6 (Six) Hours As Needed for wheezing.     • albuterol (PROVENTIL HFA;VENTOLIN HFA) 108 (90 BASE) MCG/ACT inhaler Inhale 2 puffs Every 6 (Six) Hours As Needed. ProAir  (90 Base) MCG/ACT Inhalation Aerosol Solution; Patient Sig: ProAir  (90 Base) MCG/ACT Inhalation Aerosol Solution INHALE 1 TO 2 PUFFS EVERY 4 TO 6 HOURS AS NEEDED.; 0; 29-Sep-2014; Active     • atorvastatin (LIPITOR) 10 MG tablet Take 1 tablet by mouth Daily. (Patient taking differently: Take 10 mg by mouth Every Evening.) 90 tablet 3   • azelastine (ASTELIN) 0.1 % nasal spray 1 spray into each nostril 2 (Two) Times a Day. Use 1 to 2 sprays     • fluticasone (FLONASE) 50 MCG/ACT nasal spray 2 sprays into each nostril daily.     • Fluticasone Furoate-Vilanterol (BREO ELLIPTA IN) Inhale.     • levothyroxine (SYNTHROID, LEVOTHROID) 137 MCG tablet TAKE ONE TABLET BY MOUTH DAILY 90 tablet 0   • losartan (COZAAR) 50 MG tablet Take 25 mg by mouth 2 (Two) Times a Day. Takes 1/2 50mg tab     • montelukast (SINGULAIR) 10 MG tablet Take 1 tablet by mouth daily.     • MULTIPLE VITAMINS PO Take 1 tablet/day by mouth Daily.     • sildenafil (VIAGRA) 25 MG tablet Take 25 mg by mouth daily as needed for erectile dysfunction.     • tiZANidine (ZANAFLEX) 4 MG tablet Take 4 mg by mouth Every Evening.     • vitamin C (ASCORBIC ACID) 250 MG tablet Take 250 mg by mouth daily.       No current facility-administered medications on file prior to visit.        Objective     /98  Pulse 72  Ht 68\" (172.7 cm)  Wt 251 lb (114 kg)  SpO2 96%  BMI 38.16 kg/m2    Physical Exam   Constitutional: He is oriented to person, place, and time. He appears well-developed and well-nourished.   HENT:   Head: Atraumatic.   Neurological: He is alert and oriented to " person, place, and time. He has normal strength. No cranial nerve deficit or sensory deficit.   Reflex Scores:       Bicep reflexes are 2+ on the right side and 2+ on the left side.       Brachioradialis reflexes are 2+ on the right side and 2+ on the left side.       Patellar reflexes are 1+ on the right side and 1+ on the left side.       Achilles reflexes are 1+ on the right side and 1+ on the left side.  Prominent right hand tremor with hand at rest.  Resolves with intention.     Psychiatric: He has a normal mood and affect.       Assessment/Plan   Javier was seen today for tremors.    Diagnoses and all orders for this visit:    Tremor of right hand  -     CT Head With & Without Contrast; Future  -     Ambulatory Referral to Neurology    Abnormality of gait due to impairment of balance  -     CT Head With & Without Contrast; Future  -     Ambulatory Referral to Neurology        Discussion  Patient presents with a new resting right hand tremor along with a change in gait/balance.  Further evaluate with a CT of the head.  Referral to a movment disorders neuro is warranted as well.         Future Appointments  Date Time Provider Department Center   9/7/2017 10:30 AM [facility] None   10/23/2017 10:20 AM [facility] None   10/30/2017 1:30 PM MD [facility] Espar[facility] None   2/15/2018 10:45 AM MD CHANTAL Diaz CD [facility] None   3/20/2018 11:00 AM [facility] [address]  [facility]

## 2017-08-10 RX ORDER — LOSARTAN POTASSIUM 50 MG/1
TABLET ORAL
Qty: 90 TABLET | Refills: 0 | Status: SHIPPED | OUTPATIENT
Start: 2017-08-10 | End: 2017-11-08 | Stop reason: SDUPTHER

## 2017-08-24 RX ORDER — ATORVASTATIN CALCIUM 10 MG/1
TABLET, FILM COATED ORAL
Qty: 90 TABLET | Refills: 0 | Status: SHIPPED | OUTPATIENT
Start: 2017-08-24 | End: 2018-01-14 | Stop reason: SDUPTHER

## 2017-08-24 RX ORDER — LEVOTHYROXINE SODIUM 137 UG/1
TABLET ORAL
Qty: 90 TABLET | Refills: 0 | Status: SHIPPED | OUTPATIENT
Start: 2017-08-24 | End: 2018-01-10 | Stop reason: SDUPTHER

## 2017-09-26 ENCOUNTER — LAB (OUTPATIENT)
Dept: INTERNAL MEDICINE | Facility: CLINIC | Age: 72
End: 2017-09-26

## 2017-09-26 PROCEDURE — 90662 IIV NO PRSV INCREASED AG IM: CPT | Performed by: INTERNAL MEDICINE

## 2017-09-26 PROCEDURE — G0008 ADMIN INFLUENZA VIRUS VAC: HCPCS | Performed by: INTERNAL MEDICINE

## 2017-10-03 ENCOUNTER — CLINICAL SUPPORT NO REQUIREMENTS (OUTPATIENT)
Dept: CARDIOLOGY | Facility: CLINIC | Age: 72
End: 2017-10-03

## 2017-10-03 DIAGNOSIS — I44.2 AV BLOCK, 3RD DEGREE (HCC): Primary | ICD-10-CM

## 2017-10-03 PROCEDURE — 93280 PM DEVICE PROGR EVAL DUAL: CPT | Performed by: INTERNAL MEDICINE

## 2017-10-23 ENCOUNTER — LAB (OUTPATIENT)
Dept: INTERNAL MEDICINE | Facility: CLINIC | Age: 72
End: 2017-10-23

## 2017-10-23 DIAGNOSIS — R73.01 IMPAIRED FASTING GLUCOSE: ICD-10-CM

## 2017-10-23 DIAGNOSIS — Z00.00 HEALTHCARE MAINTENANCE: ICD-10-CM

## 2017-10-23 DIAGNOSIS — E03.9 ACQUIRED HYPOTHYROIDISM: ICD-10-CM

## 2017-10-23 DIAGNOSIS — E78.00 PURE HYPERCHOLESTEROLEMIA: ICD-10-CM

## 2017-10-23 DIAGNOSIS — I10 ESSENTIAL HYPERTENSION: Primary | ICD-10-CM

## 2017-10-24 LAB
ALBUMIN SERPL-MCNC: 4.4 G/DL (ref 3.5–5.2)
ALBUMIN/GLOB SERPL: 1.9 G/DL
ALP SERPL-CCNC: 77 U/L (ref 39–117)
ALT SERPL-CCNC: 19 U/L (ref 1–41)
APPEARANCE UR: CLEAR
AST SERPL-CCNC: 20 U/L (ref 1–40)
BACTERIA #/AREA URNS HPF: NORMAL /HPF
BASOPHILS # BLD AUTO: 0.02 10*3/MM3 (ref 0–0.2)
BASOPHILS NFR BLD AUTO: 0.4 % (ref 0–1.5)
BILIRUB SERPL-MCNC: 0.4 MG/DL (ref 0.1–1.2)
BILIRUB UR QL STRIP: NEGATIVE
BUN SERPL-MCNC: 15 MG/DL (ref 8–23)
BUN/CREAT SERPL: 17.4 (ref 7–25)
CALCIUM SERPL-MCNC: 9.3 MG/DL (ref 8.6–10.5)
CHLORIDE SERPL-SCNC: 103 MMOL/L (ref 98–107)
CHOLEST SERPL-MCNC: 100 MG/DL (ref 0–200)
CO2 SERPL-SCNC: 25.2 MMOL/L (ref 22–29)
COLOR UR: YELLOW
CREAT SERPL-MCNC: 0.86 MG/DL (ref 0.76–1.27)
EOSINOPHIL # BLD AUTO: 0.22 10*3/MM3 (ref 0–0.7)
EOSINOPHIL NFR BLD AUTO: 3.9 % (ref 0.3–6.2)
EPI CELLS #/AREA URNS HPF: NORMAL /HPF
ERYTHROCYTE [DISTWIDTH] IN BLOOD BY AUTOMATED COUNT: 13 % (ref 11.5–14.5)
GFR SERPLBLD CREATININE-BSD FMLA CKD-EPI: 106 ML/MIN/1.73
GFR SERPLBLD CREATININE-BSD FMLA CKD-EPI: 87 ML/MIN/1.73
GLOBULIN SER CALC-MCNC: 2.3 GM/DL
GLUCOSE SERPL-MCNC: 88 MG/DL (ref 65–99)
GLUCOSE UR QL: NEGATIVE
HCT VFR BLD AUTO: 46.3 % (ref 40.4–52.2)
HDLC SERPL-MCNC: 44 MG/DL (ref 40–60)
HGB BLD-MCNC: 15.4 G/DL (ref 13.7–17.6)
HGB UR QL STRIP: NEGATIVE
IMM GRANULOCYTES # BLD: 0.02 10*3/MM3 (ref 0–0.03)
IMM GRANULOCYTES NFR BLD: 0.4 % (ref 0–0.5)
KETONES UR QL STRIP: NEGATIVE
LDLC SERPL CALC-MCNC: 40 MG/DL (ref 0–100)
LEUKOCYTE ESTERASE UR QL STRIP: NEGATIVE
LYMPHOCYTES # BLD AUTO: 1.5 10*3/MM3 (ref 0.9–4.8)
LYMPHOCYTES NFR BLD AUTO: 26.8 % (ref 19.6–45.3)
MCH RBC QN AUTO: 31.8 PG (ref 27–32.7)
MCHC RBC AUTO-ENTMCNC: 33.3 G/DL (ref 32.6–36.4)
MCV RBC AUTO: 95.5 FL (ref 79.8–96.2)
MICRO URNS: NORMAL
MICRO URNS: NORMAL
MONOCYTES # BLD AUTO: 0.59 10*3/MM3 (ref 0.2–1.2)
MONOCYTES NFR BLD AUTO: 10.6 % (ref 5–12)
NEUTROPHILS # BLD AUTO: 3.24 10*3/MM3 (ref 1.9–8.1)
NEUTROPHILS NFR BLD AUTO: 57.9 % (ref 42.7–76)
NITRITE UR QL STRIP: NEGATIVE
PH UR STRIP: 7 [PH] (ref 5–7.5)
PLATELET # BLD AUTO: 192 10*3/MM3 (ref 140–500)
POTASSIUM SERPL-SCNC: 4.3 MMOL/L (ref 3.5–5.2)
PROT SERPL-MCNC: 6.7 G/DL (ref 6–8.5)
PROT UR QL STRIP: NEGATIVE
RBC # BLD AUTO: 4.85 10*6/MM3 (ref 4.6–6)
RBC #/AREA URNS HPF: NORMAL /HPF
SODIUM SERPL-SCNC: 143 MMOL/L (ref 136–145)
SP GR UR: 1.01 (ref 1–1.03)
TRIGL SERPL-MCNC: 78 MG/DL (ref 0–150)
TSH SERPL DL<=0.005 MIU/L-ACNC: 1.77 MIU/ML (ref 0.27–4.2)
URINALYSIS REFLEX: NORMAL
UROBILINOGEN UR STRIP-MCNC: 0.2 MG/DL (ref 0.2–1)
VLDLC SERPL CALC-MCNC: 15.6 MG/DL (ref 5–40)
WBC # BLD AUTO: 5.59 10*3/MM3 (ref 4.5–10.7)
WBC #/AREA URNS HPF: NORMAL /HPF

## 2017-10-30 ENCOUNTER — OFFICE VISIT (OUTPATIENT)
Dept: INTERNAL MEDICINE | Facility: CLINIC | Age: 72
End: 2017-10-30

## 2017-10-30 VITALS
OXYGEN SATURATION: 98 % | DIASTOLIC BLOOD PRESSURE: 80 MMHG | BODY MASS INDEX: 34.1 KG/M2 | RESPIRATION RATE: 18 BRPM | SYSTOLIC BLOOD PRESSURE: 138 MMHG | HEIGHT: 68 IN | HEART RATE: 73 BPM | WEIGHT: 225 LBS

## 2017-10-30 DIAGNOSIS — G20 PARKINSON DISEASE (HCC): ICD-10-CM

## 2017-10-30 DIAGNOSIS — E03.9 ACQUIRED HYPOTHYROIDISM: ICD-10-CM

## 2017-10-30 DIAGNOSIS — I10 ESSENTIAL HYPERTENSION: ICD-10-CM

## 2017-10-30 DIAGNOSIS — E78.00 PURE HYPERCHOLESTEROLEMIA: ICD-10-CM

## 2017-10-30 DIAGNOSIS — Z13.6 ENCOUNTER FOR SCREENING FOR VASCULAR DISEASE: ICD-10-CM

## 2017-10-30 DIAGNOSIS — Z00.00 MEDICARE ANNUAL WELLNESS VISIT, SUBSEQUENT: Primary | ICD-10-CM

## 2017-10-30 PROBLEM — G20.A1 PARKINSON DISEASE: Status: ACTIVE | Noted: 2017-10-30

## 2017-10-30 PROCEDURE — G0439 PPPS, SUBSEQ VISIT: HCPCS | Performed by: INTERNAL MEDICINE

## 2017-10-30 PROCEDURE — 99214 OFFICE O/P EST MOD 30 MIN: CPT | Performed by: INTERNAL MEDICINE

## 2017-10-30 NOTE — PROGRESS NOTES
Subjective     Javier San is a 72 y.o. male who presents for an annual wellness visit as well as check up of htn, hld, hypothyroidism.        History of Present Illness     HTN.  Much better control since pacer placemnent.  HLD. Excellent control on atorvastatin.    Hypthyroidism.  Under good control.     Dr. Lowry diagnosed him with parkinson's disease.      Review of Systems   Respiratory: Negative for cough.    Cardiovascular: Negative for chest pain.   Musculoskeletal: Positive for back pain.   Neurological: Positive for tremors and weakness.       The following portions of the patient's history were reviewed and updated as appropriate: allergies, current medications, past family history, past medical history, past social history, past surgical history and problem list.  Health maintenance tab was reviewed and updated with the patient.       Patient Active Problem List    Diagnosis Date Noted   • Benign prostatic hyperplasia with lower urinary tract symptoms 12/07/2016   • Hyperlipemia 10/27/2016   • Thyroid nodule 10/26/2016     Note Last Updated: 10/27/2016     Noted on CT chest 3/2016.  Stable 9/2016     • AV block, Mobitz II 06/17/2016   • History of melanoma 04/15/2016     Note Last Updated: 4/15/2016     Times two.       • Atopic rhinitis 03/01/2016     Note Last Updated: 3/1/2016     Impression: 03/24/2014 - Refill Flonase and add OTC Clariten to his Singular.;      • Arthritis 03/01/2016   • Asthma 03/01/2016   • Chronic bronchitis 03/01/2016   • Degeneration of intervertebral disc of lumbar region 03/01/2016   • Essential hypertension 03/01/2016   • Hypothyroidism 03/01/2016     Note Last Updated: 3/1/2016     Impression: 03/24/2014 - Controlled with levothyroxine;      • Impaired fasting glucose 03/01/2016   • Lumbar radiculopathy 03/01/2016   • Spinal stenosis of lumbar region 03/01/2016   • Lung nodule 03/01/2016     Note Last Updated: 3/1/2016     Description: by CT of the chest 2/9/15,  recheck 8/2015     • Obstructive sleep apnea syndrome 03/01/2016     Note Last Updated: 3/1/2016     Description: Patient is maintained on BIPAP.         Past Medical History:   Diagnosis Date   • Abnormal blood chemistry    • Abnormal liver enzymes    • Acute bacterial prostatitis    • Anemia    • Anxiety 03/24/2014    Warning,Lainey   • Arthritis    • Asthma 3/1/2016   • Basal cell carcinoma    • Chronic bronchitis 3/1/2016   • Gynecomastia    • H/O degenerative disc disease    • H/O hernia repair    • Hypertension 3/1/2016    Impression: 04/08/2015 - .  Impression: 03/24/2014 - Controlled with HCTZ.;    • Hypokalemia    • Hypothyroidism    • Labile hypertension 03/01/2016    with periods of orthostasis and near syncope   • Left bundle branch block    • Low back pain    • Lumbar radiculopathy    • Lumbar stenosis    • Lung nodule     CT of chest 2/9/15   • Malignant neoplasm of cheek 3/1/2016    Description: - Basal Cell   • Mobitz (type) II atrioventricular block     s/p [employer] dual chamber PPM 2016   • Obstructive sleep apnea    • Rupture of kidney     in high school football   • Skin cancer of nose     Basal Cell   • Stricture of ureter     WITH HYDRONEPHROSIS   • Testicular pain    • Venous insufficiency    • Vitamin D deficiency        Past Surgical History:   Procedure Laterality Date   • BACK SURGERY      L4-L5 with Dr. Solomon   • CARDIAC ELECTROPHYSIOLOGY PROCEDURE N/A 6/17/2016    Procedure: Pacemaker DC new  [place];  Surgeon: Zachary Narayanan MD;  Location: [facility] INVASIVE LOCATION;  Service:    • COLON SURGERY      Complete colonoscopy- Dr. knox/[facility]   • CYSTOSCOPY TRANSURETHRAL RESECTION OF PROSTATE     • CYSTOSCOPY TRANSURETHRAL RESECTION OF PROSTATE N/A 12/7/2016    Procedure: CYSTOSCOPY TRANSURETHRAL RESECTION OF PROSTATE;  Surgeon: Law Dee MD;  Location: [facility] OR;  Service:    • HERNIA REPAIR     • NASAL SEPTUM SURGERY     • NOSE SURGERY      deviated septum   • PROSTATE  SURGERY      transurethral destruction prostate tissue   • REPLACEMENT TOTAL KNEE BILATERAL      right in 2008 and left in 2005   • TONSILLECTOMY     • TOTAL SHOULDER REPLACEMENT Bilateral     left in 2011 and right in 2013       Family History   Problem Relation Age of Onset   • Alcohol abuse Mother    • Cancer Mother      skin   • Stroke Mother    • Alcohol abuse Father    • Emphysema Father    • COPD Father    • Cancer Father      skin       Social History     Social History   • Marital status:      Spouse name: N/A   • Number of children: N/A   • Years of education: N/A     Occupational History   • Not on file.     Social History Main Topics   • Smoking status: Former Smoker     Packs/day: 1.00     Types: Cigarettes     Start date: 6/16/1964     Quit date: 6/16/1984   • Smokeless tobacco: Never Used      Comment: 20 year smoker   • Alcohol use Yes      Comment: social, 4 beers a year   • Drug use: No   • Sexual activity: Defer     Other Topics Concern   • Not on file     Social History Narrative       Current Outpatient Prescriptions on File Prior to Visit   Medication Sig Dispense Refill   • acetaminophen (TYLENOL) 325 MG tablet Take 650 mg by mouth Every 6 (Six) Hours As Needed for mild pain (1-3).     • albuterol (ACCUNEB) 1.25 MG/3ML nebulizer solution Take 1 ampule by nebulization Every 6 (Six) Hours As Needed for wheezing.     • albuterol (PROVENTIL HFA;VENTOLIN HFA) 108 (90 BASE) MCG/ACT inhaler Inhale 2 puffs Every 6 (Six) Hours As Needed. ProAir  (90 Base) MCG/ACT Inhalation Aerosol Solution; Patient Sig: ProAir  (90 Base) MCG/ACT Inhalation Aerosol Solution INHALE 1 TO 2 PUFFS EVERY 4 TO 6 HOURS AS NEEDED.; 0; 29-Sep-2014; Active     • atorvastatin (LIPITOR) 10 MG tablet TAKE 1 TABLET BY MOUTH EVERY DAY 90 tablet 0   • azelastine (ASTELIN) 0.1 % nasal spray 1 spray into each nostril 2 (Two) Times a Day. Use 1 to 2 sprays     • fluticasone (FLONASE) 50 MCG/ACT nasal spray 2 sprays  "into each nostril daily.     • Fluticasone Furoate-Vilanterol (BREO ELLIPTA IN) Inhale.     • levothyroxine (SYNTHROID, LEVOTHROID) 137 MCG tablet TAKE 1 TABLET BY MOUTH EVERY DAY 90 tablet 0   • losartan (COZAAR) 50 MG tablet TAKE ONE TABLET BY MOUTH DAILY 90 tablet 0   • montelukast (SINGULAIR) 10 MG tablet Take 1 tablet by mouth daily.     • MULTIPLE VITAMINS PO Take 1 tablet/day by mouth Daily.     • sildenafil (VIAGRA) 25 MG tablet Take 25 mg by mouth daily as needed for erectile dysfunction.     • tiZANidine (ZANAFLEX) 4 MG tablet Take 4 mg by mouth Every Evening.     • vitamin C (ASCORBIC ACID) 250 MG tablet Take 250 mg by mouth daily.       No current facility-administered medications on file prior to visit.        No Known Allergies    Immunization History   Administered Date(s) Administered   • Flu Vaccine High Dose PF 65YR+ 09/26/2017   • Influenza, Quadrivalent 10/21/2015   • Pneumococcal Conjugate 13-Valent 10/21/2015   • Pneumococcal Polysaccharide 10/01/2015   • Tdap 06/23/2013   • Zoster 02/01/2010       Objective     /80  Pulse 73  Resp 18  Ht 68\" (172.7 cm)  Wt 225 lb (102 kg)  SpO2 98%  BMI 34.21 kg/m2    Physical Exam   Constitutional: He is oriented to person, place, and time. He appears well-developed and well-nourished.   HENT:   Head: Normocephalic and atraumatic.   Right Ear: Hearing and tympanic membrane normal.   Left Ear: Hearing and tympanic membrane normal.   Mouth/Throat: No posterior oropharyngeal erythema.   Neck: Neck supple. No thyromegaly present.   Cardiovascular: Normal rate, regular rhythm and normal heart sounds.    No murmur heard.  Pulmonary/Chest: Effort normal and breath sounds normal.   Abdominal: Soft. He exhibits no distension. There is no hepatosplenomegaly. There is no tenderness.   Lymphadenopathy:     He has no cervical adenopathy.   Neurological: He is alert and oriented to person, place, and time.   Skin: Skin is warm and dry.   Psychiatric: He has a " normal mood and affect. His speech is normal and behavior is normal. Judgment and thought content normal. Cognition and memory are normal.       Assessment/Plan   Javier was seen today for annual exam.    Diagnoses and all orders for this visit:    Medicare annual wellness visit, subsequent    Encounter for screening for vascular disease  -     Vascular Screening (Bundle) CAR; Future    Essential hypertension    Pure hypercholesterolemia    Acquired hypothyroidism        Discussion    AWV.  See scanned forms for antolin history, PHQ-9, functional ability questionnaire, cognitive impairment screening.  Direct observation of cognitive abilities:  The patient does not exhibit  any impairment in cognitive abilities upon direct observation at today's visit.   These were all reviewed with the patient and the patient was provided with a personal prevention plan of service in patient instructions.  Patient was given advice or handouts on the following topics:  nutrition, fall prevention, exercise, weight management   Htn.  Continue current regimen.  HLD.  Continue atorvastatin.   Hypothyroidism.  Continue levothyroxine at current dose.     Health Maintenance   Topic Date Due   • MEDICARE ANNUAL WELLNESS  03/01/2016   • AAA SCREEN (ONE-TIME)  03/01/2016   • LIPID PANEL  10/23/2018   • COLONOSCOPY  05/01/2023   • TDAP/TD VACCINES (2 - Td) 06/23/2023   • HEPATITIS C SCREENING  Addressed   • INFLUENZA VACCINE  Completed   • PNEUMOCOCCAL VACCINES (65+ LOW/MEDIUM RISK)  Completed   • ZOSTER VACCINE  Completed            Future Appointments  Date Time Provider Department Center   2/15/2018 10:00 AM CATHYART IN OFFICE, [facility] None   2/15/2018 10:45 AM MD CHANTAL Diaz CD [facility] None   3/20/2018 11:00 AM ANDERS NOWAK CT 1  [facility]

## 2017-11-07 ENCOUNTER — HOSPITAL ENCOUNTER (OUTPATIENT)
Dept: CARDIOLOGY | Facility: HOSPITAL | Age: 72
Discharge: HOME | End: 2017-11-07
Admitting: INTERNAL MEDICINE

## 2017-11-07 VITALS
DIASTOLIC BLOOD PRESSURE: 74 MMHG | HEART RATE: 74 BPM | BODY MASS INDEX: 34.84 KG/M2 | HEIGHT: 67 IN | WEIGHT: 222 LBS | SYSTOLIC BLOOD PRESSURE: 142 MMHG

## 2017-11-07 DIAGNOSIS — Z13.6 ENCOUNTER FOR SCREENING FOR VASCULAR DISEASE: ICD-10-CM

## 2017-11-07 LAB
BH CV ECHO MEAS - DIST AO DIAM: 1.49 CM
BH CV VAS BP LEFT ARM: NORMAL MMHG
BH CV VAS BP RIGHT ARM: NORMAL MMHG
BH CV XLRA MEAS - MID AO DIAM: 1.81 CM
BH CV XLRA MEAS - PAD LEFT ABI DP: 1.15
BH CV XLRA MEAS - PAD LEFT ABI PT: 1.14
BH CV XLRA MEAS - PAD LEFT ARM: 140 MMHG
BH CV XLRA MEAS - PAD LEFT LEG DP: 164 MMHG
BH CV XLRA MEAS - PAD LEFT LEG PT: 162 MMHG
BH CV XLRA MEAS - PAD RIGHT ABI DP: 1.12
BH CV XLRA MEAS - PAD RIGHT ABI PT: 1.12
BH CV XLRA MEAS - PAD RIGHT ARM: 142 MMHG
BH CV XLRA MEAS - PAD RIGHT LEG DP: 160 MMHG
BH CV XLRA MEAS - PAD RIGHT LEG PT: 160 MMHG
BH CV XLRA MEAS - PROX AO DIAM: 2.12 CM
BH CV XLRA MEAS LEFT ICA/CCA RATIO: 1.28
BH CV XLRA MEAS LEFT MID CCA PSV: NORMAL CM/SEC
BH CV XLRA MEAS LEFT MID ICA PSV: NORMAL CM/SEC
BH CV XLRA MEAS LEFT PROX ECA PSV: NORMAL CM/SEC
BH CV XLRA MEAS RIGHT ICA/CCA RATIO: 0.87
BH CV XLRA MEAS RIGHT MID CCA PSV: NORMAL CM/SEC
BH CV XLRA MEAS RIGHT MID ICA PSV: NORMAL CM/SEC
BH CV XLRA MEAS RIGHT PROX ECA PSV: NORMAL CM/SEC

## 2017-11-07 PROCEDURE — 93799 UNLISTED CV SVC/PROCEDURE: CPT

## 2017-11-08 RX ORDER — LOSARTAN POTASSIUM 50 MG/1
TABLET ORAL
Qty: 90 TABLET | Refills: 0 | Status: SHIPPED | OUTPATIENT
Start: 2017-11-08 | End: 2018-02-07 | Stop reason: SDUPTHER

## 2017-12-30 DIAGNOSIS — Z20.828 EXPOSURE TO INFLUENZA: Primary | ICD-10-CM

## 2017-12-30 RX ORDER — OSELTAMIVIR PHOSPHATE 75 MG/1
75 CAPSULE ORAL DAILY
Qty: 7 CAPSULE | Refills: 0 | Status: SHIPPED | OUTPATIENT
Start: 2017-12-30 | End: 2018-01-06

## 2018-01-10 RX ORDER — LEVOTHYROXINE SODIUM 137 UG/1
TABLET ORAL
Qty: 90 TABLET | Refills: 0 | Status: SHIPPED | OUTPATIENT
Start: 2018-01-10 | End: 2018-04-08 | Stop reason: SDUPTHER

## 2018-01-11 ENCOUNTER — CLINICAL SUPPORT NO REQUIREMENTS (OUTPATIENT)
Dept: CARDIOLOGY | Facility: CLINIC | Age: 73
End: 2018-01-11

## 2018-01-11 DIAGNOSIS — I44.2 COMPLETE HEART BLOCK (HCC): Primary | ICD-10-CM

## 2018-01-11 PROCEDURE — 93280 PM DEVICE PROGR EVAL DUAL: CPT | Performed by: INTERNAL MEDICINE

## 2018-01-15 RX ORDER — ATORVASTATIN CALCIUM 10 MG/1
TABLET, FILM COATED ORAL
Qty: 90 TABLET | Refills: 0 | Status: SHIPPED | OUTPATIENT
Start: 2018-01-15 | End: 2018-04-08 | Stop reason: SDUPTHER

## 2018-02-07 ENCOUNTER — OFFICE VISIT (OUTPATIENT)
Dept: INTERNAL MEDICINE | Facility: CLINIC | Age: 73
End: 2018-02-07

## 2018-02-07 VITALS
TEMPERATURE: 97.5 F | BODY MASS INDEX: 34.61 KG/M2 | OXYGEN SATURATION: 99 % | SYSTOLIC BLOOD PRESSURE: 134 MMHG | DIASTOLIC BLOOD PRESSURE: 78 MMHG | WEIGHT: 221 LBS | HEART RATE: 87 BPM

## 2018-02-07 DIAGNOSIS — J20.8 ACUTE BRONCHITIS DUE TO OTHER SPECIFIED ORGANISMS: ICD-10-CM

## 2018-02-07 DIAGNOSIS — J45.909 UNCOMPLICATED ASTHMA, UNSPECIFIED ASTHMA SEVERITY, UNSPECIFIED WHETHER PERSISTENT: ICD-10-CM

## 2018-02-07 DIAGNOSIS — R50.9 FEVER, UNSPECIFIED FEVER CAUSE: ICD-10-CM

## 2018-02-07 DIAGNOSIS — R68.89 FLU-LIKE SYMPTOMS: Primary | ICD-10-CM

## 2018-02-07 LAB
EXPIRATION DATE: NORMAL
FLUAV AG NPH QL: NORMAL
FLUBV AG NPH QL: NORMAL
INTERNAL CONTROL: NORMAL
Lab: NORMAL

## 2018-02-07 PROCEDURE — 71046 X-RAY EXAM CHEST 2 VIEWS: CPT | Performed by: INTERNAL MEDICINE

## 2018-02-07 PROCEDURE — 87804 INFLUENZA ASSAY W/OPTIC: CPT | Performed by: INTERNAL MEDICINE

## 2018-02-07 PROCEDURE — 99214 OFFICE O/P EST MOD 30 MIN: CPT | Performed by: INTERNAL MEDICINE

## 2018-02-07 RX ORDER — OSELTAMIVIR PHOSPHATE 75 MG/1
75 CAPSULE ORAL 2 TIMES DAILY
Qty: 10 CAPSULE | Refills: 0 | Status: SHIPPED | OUTPATIENT
Start: 2018-02-07 | End: 2018-04-16

## 2018-02-07 RX ORDER — DOXYCYCLINE 100 MG/1
100 CAPSULE ORAL EVERY 12 HOURS SCHEDULED
Qty: 20 CAPSULE | Refills: 0 | Status: SHIPPED | OUTPATIENT
Start: 2018-02-07 | End: 2018-04-16 | Stop reason: ALTCHOICE

## 2018-02-07 RX ORDER — LOSARTAN POTASSIUM 50 MG/1
TABLET ORAL
Qty: 90 TABLET | Refills: 0 | Status: SHIPPED | OUTPATIENT
Start: 2018-02-07 | End: 2018-05-03 | Stop reason: SDUPTHER

## 2018-02-07 NOTE — PROGRESS NOTES
Subjective     Javier San is a 72 y.o. male who presents with   Chief Complaint   Patient presents with   • Generalized Body Aches   • Fever   • Cough       History of Present Illness     He has been sick for three days.  Severe aches all over.  Low grade fever yesterday.  O2 has been lower.  Head and nasal congestion.  Sore throat.  Cough as well with some production.  He is SOA but no wheezing.  Slightly better today but overall symptoms have persisted. He is an asthma patient.     Review of Systems   Constitutional: Positive for fatigue and fever.   HENT: Positive for congestion.    Respiratory: Positive for cough.    Musculoskeletal: Positive for arthralgias.       The following portions of the patient's history were reviewed and updated as appropriate: allergies, current medications and problem list.    Patient Active Problem List    Diagnosis Date Noted   • Parkinson disease 10/30/2017   • Benign prostatic hyperplasia with lower urinary tract symptoms 12/07/2016   • Hyperlipemia 10/27/2016   • Thyroid nodule 10/26/2016     Note Last Updated: 10/31/2017     Noted on CT chest 3/2016.  Stable 9/2016.  Followed by ENT.       • AV block, Mobitz II 06/17/2016   • History of melanoma 04/15/2016     Note Last Updated: 4/15/2016     Times two.       • Atopic rhinitis 03/01/2016   • Arthritis 03/01/2016   • Asthma 03/01/2016   • Chronic bronchitis 03/01/2016   • Degeneration of intervertebral disc of lumbar region 03/01/2016   • Essential hypertension 03/01/2016   • Hypothyroidism 03/01/2016   • Impaired fasting glucose 03/01/2016   • Lumbar radiculopathy 03/01/2016   • Spinal stenosis of lumbar region 03/01/2016   • Lung nodule 03/01/2016     Note Last Updated: 10/31/2017     Follow over two years by Dr. Garnica.      • Obstructive sleep apnea syndrome 03/01/2016     Note Last Updated: 3/1/2016     Description: Patient is maintained on BIPAP.         Current Outpatient Prescriptions on File Prior to Visit    Medication Sig Dispense Refill   • acetaminophen (TYLENOL) 325 MG tablet Take 650 mg by mouth Every 6 (Six) Hours As Needed for mild pain (1-3).     • albuterol (ACCUNEB) 1.25 MG/3ML nebulizer solution Take 1 ampule by nebulization Every 6 (Six) Hours As Needed for wheezing.     • albuterol (PROVENTIL HFA;VENTOLIN HFA) 108 (90 BASE) MCG/ACT inhaler Inhale 2 puffs Every 6 (Six) Hours As Needed. ProAir  (90 Base) MCG/ACT Inhalation Aerosol Solution; Patient Sig: ProAir  (90 Base) MCG/ACT Inhalation Aerosol Solution INHALE 1 TO 2 PUFFS EVERY 4 TO 6 HOURS AS NEEDED.; 0; 29-Sep-2014; Active     • atorvastatin (LIPITOR) 10 MG tablet TAKE 1 TABLET BY MOUTH EVERY DAY 90 tablet 0   • azelastine (ASTELIN) 0.1 % nasal spray 1 spray into each nostril 2 (Two) Times a Day. Use 1 to 2 sprays     • fluticasone (FLONASE) 50 MCG/ACT nasal spray 2 sprays into each nostril daily.     • Fluticasone Furoate-Vilanterol (BREO ELLIPTA IN) Inhale.     • levothyroxine (SYNTHROID, LEVOTHROID) 137 MCG tablet TAKE 1 TABLET BY MOUTH EVERY DAY 90 tablet 0   • losartan (COZAAR) 50 MG tablet TAKE 1 TABLET BY MOUTH EVERY DAY 90 tablet 0   • montelukast (SINGULAIR) 10 MG tablet Take 1 tablet by mouth daily.     • MULTIPLE VITAMINS PO Take 1 tablet/day by mouth Daily.     • sildenafil (VIAGRA) 25 MG tablet Take 25 mg by mouth daily as needed for erectile dysfunction.     • tiZANidine (ZANAFLEX) 4 MG tablet Take 4 mg by mouth Every Evening.     • vitamin C (ASCORBIC ACID) 250 MG tablet Take 250 mg by mouth daily.       No current facility-administered medications on file prior to visit.        Objective     /78  Pulse 87  Temp 97.5 °F (36.4 °C)  Wt 100 kg (221 lb)  SpO2 99%  BMI 34.61 kg/m2    Physical Exam   Constitutional: He is oriented to person, place, and time. He appears well-developed and well-nourished.   HENT:   Head: Atraumatic.   Right Ear: Hearing and tympanic membrane normal.   Left Ear: Hearing and tympanic  membrane normal.   Mouth/Throat: No oropharyngeal exudate or posterior oropharyngeal erythema.   Cardiovascular: Normal rate, regular rhythm and normal heart sounds.    Pulmonary/Chest: Effort normal and breath sounds normal.   Neurological: He is alert and oriented to person, place, and time.   Skin: Skin is warm and dry.   Psychiatric: He has a normal mood and affect.     X-rays of the chest  performed today for following indication:   cough.  X-ray reveal nad.  No change 11/15/2016.  X-ray sent to radiology for official interpretation and findings.        Assessment/Plan   Javier was seen today for generalized body aches, fever and cough.    Diagnoses and all orders for this visit:    Flu-like symptoms  -     POCT Influenza A/B  -     XR Chest PA & Lateral    Fever, unspecified fever cause  -     POCT Influenza A/B  -     XR Chest PA & Lateral    Acute bronchitis due to other specified organisms  -     XR Chest PA & Lateral    Uncomplicated asthma, unspecified asthma severity, unspecified whether persistent  -     XR Chest PA & Lateral    Other orders  -     oseltamivir (TAMIFLU) 75 MG capsule; Take 1 capsule by mouth 2 (Two) Times a Day.  -     doxycycline (MONODOX) 100 MG capsule; Take 1 capsule by mouth Every 12 (Twelve) Hours.        Discussion    Patient presents with an acute respiratory illness c/w the flu and acute bronchitis although he does test negative.  A prescription for Tamiflu is provided as well as bacterial coverage.  Increase rest and fluids.  Warned of pneumonia complication.  The patient will let me know if not feeling better over the next several days.             Future Appointments  Date Time Provider Department Center   4/9/2018 10:30 AM [facility]   4/16/2018 12:30 PM PACEART IN OFFICE, [facility] CHANTAL COON [facility] None   4/16/2018 1:00 PM MD CHANTAL Diaz CD [facility] None   4/30/2018 1:00 PM Lainey Gaffney MD [facility]  [facility] None

## 2018-04-09 ENCOUNTER — HOSPITAL ENCOUNTER (OUTPATIENT)
Dept: CT IMAGING | Facility: HOSPITAL | Age: 73
Discharge: HOME | End: 2018-04-09
Attending: INTERNAL MEDICINE | Admitting: INTERNAL MEDICINE

## 2018-04-09 DIAGNOSIS — R26.89 ABNORMALITY OF GAIT DUE TO IMPAIRMENT OF BALANCE: ICD-10-CM

## 2018-04-09 DIAGNOSIS — R25.1 TREMOR OF RIGHT HAND: ICD-10-CM

## 2018-04-09 DIAGNOSIS — R91.1 PULMONARY NODULE: ICD-10-CM

## 2018-04-09 PROCEDURE — 71250 CT THORAX DX C-: CPT

## 2018-04-09 RX ORDER — ATORVASTATIN CALCIUM 10 MG/1
TABLET, FILM COATED ORAL
Qty: 90 TABLET | Refills: 0 | Status: SHIPPED | OUTPATIENT
Start: 2018-04-09 | End: 2018-07-04 | Stop reason: SDUPTHER

## 2018-04-09 RX ORDER — LEVOTHYROXINE SODIUM 137 UG/1
TABLET ORAL
Qty: 90 TABLET | Refills: 0 | Status: SHIPPED | OUTPATIENT
Start: 2018-04-09 | End: 2018-07-04 | Stop reason: SDUPTHER

## 2018-04-16 ENCOUNTER — CLINICAL SUPPORT NO REQUIREMENTS (OUTPATIENT)
Dept: CARDIOLOGY | Facility: CLINIC | Age: 73
End: 2018-04-16

## 2018-04-16 ENCOUNTER — OFFICE VISIT (OUTPATIENT)
Dept: CARDIOLOGY | Facility: CLINIC | Age: 73
End: 2018-04-16

## 2018-04-16 VITALS
SYSTOLIC BLOOD PRESSURE: 118 MMHG | WEIGHT: 221.1 LBS | DIASTOLIC BLOOD PRESSURE: 72 MMHG | HEIGHT: 68 IN | BODY MASS INDEX: 33.51 KG/M2 | HEART RATE: 62 BPM

## 2018-04-16 DIAGNOSIS — Z95.0 HISTORY OF PACEMAKER: ICD-10-CM

## 2018-04-16 DIAGNOSIS — I44.1 AV BLOCK, MOBITZ II: Primary | ICD-10-CM

## 2018-04-16 DIAGNOSIS — I10 ESSENTIAL HYPERTENSION: ICD-10-CM

## 2018-04-16 DIAGNOSIS — I44.2 COMPLETE HEART BLOCK (HCC): Primary | ICD-10-CM

## 2018-04-16 PROCEDURE — 93280 PM DEVICE PROGR EVAL DUAL: CPT | Performed by: INTERNAL MEDICINE

## 2018-04-16 PROCEDURE — 99213 OFFICE O/P EST LOW 20 MIN: CPT | Performed by: INTERNAL MEDICINE

## 2018-04-16 RX ORDER — CARBIDOPA/LEVODOPA 25MG-250MG
TABLET ORAL
Refills: 2 | COMMUNITY
Start: 2018-04-09 | End: 2020-11-03 | Stop reason: SDUPTHER

## 2018-04-16 NOTE — PROGRESS NOTES
Date of Office Visit: 2018  Encounter Provider: Jono Madden MD  Place of Service: [facility] CARDIOLOGY  Patient Name: Javier San  :1945    Chief Complaint   Patient presents with   • Slow Heart Rate     AV BLOCK/  1 YR FOLLOW UP    :     HPI: Javier San is a 72 y.o. male who presents today to follow up. He has a history of near syncope and labile hypertension with orthostasis.  In 2016, he presented to the ER with bradycardia/Mobitz II heart block, and had a dual chamber [employer] pacemaker placed. He has not had syncope, although he does still have intermittent lightheadedness (extremely brief, seconds long episodes). He also has very labile blood pressures, which can be high or low.   Since I saw him last, he was diagnosed with Parkinson's.      Past Medical History:   Diagnosis Date   • Abnormal blood chemistry    • Abnormal liver enzymes    • Acute bacterial prostatitis    • Anemia    • Anxiety 2014    Warning,Lainey   • Arthritis    • Asthma 3/1/2016   • Basal cell carcinoma    • Chronic bronchitis 3/1/2016   • Gynecomastia    • H/O degenerative disc disease    • H/O hernia repair    • Hypertension 3/1/2016    Impression: 2015 - .  Impression: 2014 - Controlled with HCTZ.;    • Hypokalemia    • Hypothyroidism    • Labile hypertension 2016    with periods of orthostasis and near syncope   • Left bundle branch block    • Low back pain    • Lumbar radiculopathy    • Lumbar stenosis    • Lung nodule     CT of chest 2/9/15   • Malignant neoplasm of cheek 3/1/2016    Description: - Basal Cell   • Mobitz (type) II atrioventricular block     s/p [employer] dual chamber PPM    • Obstructive sleep apnea    • Parkinson disease    • Rupture of kidney     in high school football   • Skin cancer of nose     Basal Cell   • Stricture of ureter     WITH HYDRONEPHROSIS   • Testicular pain    • Venous insufficiency    • Vitamin D  deficiency        Past Surgical History:   Procedure Laterality Date   • BACK SURGERY      L4-L5 with Dr. Solomon   • CARDIAC ELECTROPHYSIOLOGY PROCEDURE N/A 6/17/2016    Procedure: Pacemaker DC new  [place];  Surgeon: Zachary Narayanan MD;  Location: [facility] INVASIVE LOCATION;  Service:    • COLON SURGERY      Complete colonoscopy- Dr. knox/[facility]   • CYSTOSCOPY TRANSURETHRAL RESECTION OF PROSTATE     • CYSTOSCOPY TRANSURETHRAL RESECTION OF PROSTATE N/A 12/7/2016    Procedure: CYSTOSCOPY TRANSURETHRAL RESECTION OF PROSTATE;  Surgeon: Law Dee MD;  Location: [facility] OR;  Service:    • HERNIA REPAIR     • NASAL SEPTUM SURGERY     • NOSE SURGERY      deviated septum   • PROSTATE SURGERY      transurethral destruction prostate tissue   • REPLACEMENT TOTAL KNEE BILATERAL      right in 2008 and left in 2005   • TONSILLECTOMY     • TOTAL SHOULDER REPLACEMENT Bilateral     left in 2011 and right in 2013       Social History     Social History   • Marital status:      Spouse name: N/A   • Number of children: N/A   • Years of education: N/A     Occupational History   • Not on file.     Social History Main Topics   • Smoking status: Former Smoker     Packs/day: 1.00     Types: Cigarettes     Start date: 6/16/1964     Quit date: 6/16/1984   • Smokeless tobacco: Never Used      Comment: 20 year smoker/ CAFFEINE USE: 1-2 SOFT DRINKS DAILY.    • Alcohol use Yes      Comment: social, 4 beers a year   • Drug use: No   • Sexual activity: Defer     Other Topics Concern   • Not on file     Social History Narrative   • No narrative on file       Family History   Problem Relation Age of Onset   • Alcohol abuse Mother    • Cancer Mother      skin   • Stroke Mother    • Alcohol abuse Father    • Emphysema Father    • COPD Father    • Cancer Father      skin       Review of Systems   Cardiovascular: Positive for leg swelling. Negative for chest pain and palpitations.   Respiratory: Negative for shortness of breath.     Musculoskeletal: Positive for back pain.   Neurological: Positive for loss of balance and tremors.   All other systems reviewed and are negative.      No Known Allergies      Current Outpatient Prescriptions:   •  acetaminophen (TYLENOL) 325 MG tablet, Take 650 mg by mouth Every 6 (Six) Hours As Needed for mild pain (1-3)., Disp: , Rfl:   •  albuterol (ACCUNEB) 1.25 MG/3ML nebulizer solution, Take 1 ampule by nebulization Every 6 (Six) Hours As Needed for wheezing., Disp: , Rfl:   •  atorvastatin (LIPITOR) 10 MG tablet, TAKE 1 TABLET BY MOUTH EVERY DAY, Disp: 90 tablet, Rfl: 0  •  azelastine (ASTELIN) 0.1 % nasal spray, 1 spray into each nostril 2 (Two) Times a Day. Use 1 to 2 sprays, Disp: , Rfl:   •  fluticasone (FLONASE) 50 MCG/ACT nasal spray, 2 sprays into each nostril daily., Disp: , Rfl:   •  Fluticasone Furoate-Vilanterol (BREO ELLIPTA IN), Inhale., Disp: , Rfl:   •  levothyroxine (SYNTHROID, LEVOTHROID) 137 MCG tablet, TAKE 1 TABLET BY MOUTH EVERY DAY, Disp: 90 tablet, Rfl: 0  •  losartan (COZAAR) 50 MG tablet, TAKE 1 TABLET BY MOUTH EVERY DAY, Disp: 90 tablet, Rfl: 0  •  montelukast (SINGULAIR) 10 MG tablet, Take 1 tablet by mouth daily., Disp: , Rfl:   •  MULTIPLE VITAMINS PO, Take 1 tablet/day by mouth Daily., Disp: , Rfl:   •  vitamin C (ASCORBIC ACID) 250 MG tablet, Take 250 mg by mouth daily., Disp: , Rfl:   •  albuterol (PROVENTIL HFA;VENTOLIN HFA) 108 (90 BASE) MCG/ACT inhaler, Inhale 2 puffs Every 6 (Six) Hours As Needed. ProAir  (90 Base) MCG/ACT Inhalation Aerosol Solution; Patient Sig: ProAir  (90 Base) MCG/ACT Inhalation Aerosol Solution INHALE 1 TO 2 PUFFS EVERY 4 TO 6 HOURS AS NEEDED.; 0; 29-Sep-2014; Active, Disp: , Rfl:   •  carbidopa-levodopa (SINEMET)  MG per tablet, , Disp: , Rfl:   •  carbidopa-levodopa (SINEMET)  MG per tablet, TK 1 T PO TID, Disp: , Rfl: 2  •  sildenafil (VIAGRA) 25 MG tablet, Take 25 mg by mouth daily as needed for erectile dysfunction.,  "Disp: , Rfl:      Objective:     Vitals:    04/16/18 1307   BP: 118/72   BP Location: Right arm   Pulse: 62   Weight: 100 kg (221 lb 1.6 oz)   Height: 172.7 cm (68\")     Body mass index is 33.62 kg/m².    Physical Exam   Constitutional: He is oriented to person, place, and time.   Obese   HENT:   Head: Normocephalic.   Nose: Nose normal.   Mouth/Throat: Oropharynx is clear and moist.   Eyes: Conjunctivae and EOM are normal. Pupils are equal, round, and reactive to light.   Neck: Normal range of motion.   Cannot assess for JVD due to habitus   Cardiovascular: Normal rate, regular rhythm, normal heart sounds and intact distal pulses.    No murmur heard.  Pulmonary/Chest: Effort normal.   Abdominal: Soft.   Obesity limits abdominal exam   Musculoskeletal: Normal range of motion. He exhibits no edema.   Neurological: He is alert and oriented to person, place, and time. He displays tremor. No cranial nerve deficit.   Skin: Skin is warm and dry. No rash noted.   Psychiatric: He has a normal mood and affect. His behavior is normal. Judgment and thought content normal.   Vitals reviewed.      Procedures      Assessment:       Diagnosis Plan   1. AV block, Mobitz II     2. History of pacemaker     3. Essential hypertension            Plan:       1/2.  He has a history of high grade AVB and is now s/p dual chamber PPM placement.  He paces in the % of the time.  He is doing well.    3.  He has a history of labile hypertension.  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  His BP is perfect today.  He hasn't had syncope so we'll keep things where they are for now.    Sincerely,       Jono Madden MD                "

## 2018-04-30 ENCOUNTER — OFFICE VISIT (OUTPATIENT)
Dept: INTERNAL MEDICINE | Facility: CLINIC | Age: 73
End: 2018-04-30

## 2018-04-30 VITALS
HEART RATE: 82 BPM | SYSTOLIC BLOOD PRESSURE: 112 MMHG | WEIGHT: 220 LBS | DIASTOLIC BLOOD PRESSURE: 74 MMHG | BODY MASS INDEX: 33.45 KG/M2 | OXYGEN SATURATION: 96 %

## 2018-04-30 DIAGNOSIS — E03.9 ACQUIRED HYPOTHYROIDISM: ICD-10-CM

## 2018-04-30 DIAGNOSIS — I10 ESSENTIAL HYPERTENSION: ICD-10-CM

## 2018-04-30 DIAGNOSIS — E78.00 PURE HYPERCHOLESTEROLEMIA: ICD-10-CM

## 2018-04-30 DIAGNOSIS — M48.061 SPINAL STENOSIS OF LUMBAR REGION, UNSPECIFIED WHETHER NEUROGENIC CLAUDICATION PRESENT: ICD-10-CM

## 2018-04-30 DIAGNOSIS — M54.40 BILATERAL LOW BACK PAIN WITH SCIATICA, SCIATICA LATERALITY UNSPECIFIED, UNSPECIFIED CHRONICITY: Primary | ICD-10-CM

## 2018-04-30 PROCEDURE — 99214 OFFICE O/P EST MOD 30 MIN: CPT | Performed by: INTERNAL MEDICINE

## 2018-04-30 RX ORDER — CARBIDOPA AND LEVODOPA 50; 200 MG/1; MG/1
1 TABLET, EXTENDED RELEASE ORAL 3 TIMES DAILY
COMMUNITY
Start: 2018-04-23 | End: 2020-01-16 | Stop reason: SDUPTHER

## 2018-04-30 NOTE — PROGRESS NOTES
Subjective     Javier San is a 73 y.o. male who presents with   Chief Complaint   Patient presents with   • Hypertension   • Hyperlipidemia   • Hypothyroidism       History of Present Illness     C/o chronic LBP.  It is getting worse.  He also has pain in the front of the legs.  Knife like pain with movement.  Debilitating pain at times.  He has a back stimulator and sees [facility] management.  No injury.  Weakness is associated.    HTN.  Excellent control.   HLD.  Due for labs.   Hypthyroidism.  Due for labs on levothyroxine.       Review of Systems   Respiratory: Negative.    Cardiovascular: Negative.        The following portions of the patient's history were reviewed and updated as appropriate: allergies, current medications and problem list.    Patient Active Problem List    Diagnosis Date Noted   • History of pacemaker 04/16/2018   • Parkinson disease 10/30/2017   • Benign prostatic hyperplasia with lower urinary tract symptoms 12/07/2016   • Hyperlipemia 10/27/2016   • Thyroid nodule 10/26/2016     Note Last Updated: 10/31/2017     Noted on CT chest 3/2016.  Stable 9/2016.  Followed by ENT.       • AV block, Mobitz II 06/17/2016   • History of melanoma 04/15/2016     Note Last Updated: 4/15/2016     Times two.       • Atopic rhinitis 03/01/2016   • Arthritis 03/01/2016   • Asthma 03/01/2016   • Chronic bronchitis 03/01/2016   • Degeneration of intervertebral disc of lumbar region 03/01/2016   • Essential hypertension 03/01/2016   • Hypothyroidism 03/01/2016   • Impaired fasting glucose 03/01/2016   • Lumbar radiculopathy 03/01/2016   • Spinal stenosis of lumbar region 03/01/2016   • Lung nodule 03/01/2016     Note Last Updated: 10/31/2017     Follow over two years by Dr. Garnica.      • Obstructive sleep apnea syndrome 03/01/2016     Note Last Updated: 3/1/2016     Description: Patient is maintained on BIPAP.         Current Outpatient Prescriptions on File Prior to Visit   Medication Sig Dispense  Refill   • acetaminophen (TYLENOL) 325 MG tablet Take 650 mg by mouth Every 6 (Six) Hours As Needed for mild pain (1-3).     • albuterol (ACCUNEB) 1.25 MG/3ML nebulizer solution Take 1 ampule by nebulization Every 6 (Six) Hours As Needed for wheezing.     • albuterol (PROVENTIL HFA;VENTOLIN HFA) 108 (90 BASE) MCG/ACT inhaler Inhale 2 puffs Every 6 (Six) Hours As Needed. ProAir  (90 Base) MCG/ACT Inhalation Aerosol Solution; Patient Sig: ProAir  (90 Base) MCG/ACT Inhalation Aerosol Solution INHALE 1 TO 2 PUFFS EVERY 4 TO 6 HOURS AS NEEDED.; 0; 29-Sep-2014; Active     • atorvastatin (LIPITOR) 10 MG tablet TAKE 1 TABLET BY MOUTH EVERY DAY 90 tablet 0   • azelastine (ASTELIN) 0.1 % nasal spray 1 spray into each nostril 2 (Two) Times a Day. Use 1 to 2 sprays     • carbidopa-levodopa (SINEMET)  MG per tablet TK 1 T PO four time daily  2   • fluticasone (FLONASE) 50 MCG/ACT nasal spray 2 sprays into each nostril daily.     • levothyroxine (SYNTHROID, LEVOTHROID) 137 MCG tablet TAKE 1 TABLET BY MOUTH EVERY DAY 90 tablet 0   • losartan (COZAAR) 50 MG tablet TAKE 1 TABLET BY MOUTH EVERY DAY 90 tablet 0   • montelukast (SINGULAIR) 10 MG tablet Take 1 tablet by mouth daily.     • MULTIPLE VITAMINS PO Take 1 tablet/day by mouth Daily.     • sildenafil (VIAGRA) 25 MG tablet Take 25 mg by mouth daily as needed for erectile dysfunction.     • vitamin C (ASCORBIC ACID) 250 MG tablet Take 250 mg by mouth daily.     • Fluticasone Furoate-Vilanterol (BREO ELLIPTA IN) Inhale.     • [DISCONTINUED] carbidopa-levodopa (SINEMET)  MG per tablet        No current facility-administered medications on file prior to visit.        Objective     /74   Pulse 82   Wt 99.8 kg (220 lb)   SpO2 96%   BMI 33.45 kg/m²     Physical Exam   Constitutional: He is oriented to person, place, and time. He appears well-developed and well-nourished.   HENT:   Head: Atraumatic.   Cardiovascular: Normal rate, regular rhythm and  normal heart sounds.    Pulmonary/Chest: Effort normal and breath sounds normal.   Neurological: He is alert and oriented to person, place, and time. He has normal strength. No sensory deficit.   Reflex Scores:       Patellar reflexes are 2+ on the right side and 2+ on the left side.       Achilles reflexes are 2+ on the right side and 2+ on the left side.  Skin: Skin is warm and dry.   Psychiatric: He has a normal mood and affect.       Assessment/Plan   Javier was seen today for hypertension, hyperlipidemia and hypothyroidism.    Diagnoses and all orders for this visit:    Bilateral low back pain with sciatica, sciatica laterality unspecified, unspecified chronicity  -     CT lumbar spine wo contrast; Future    Essential hypertension  -     CBC & Differential  -     Comprehensive Metabolic Panel  -     Lipid Panel  -     TSH Rfx On Abnormal To Free T4    Pure hypercholesterolemia  -     CBC & Differential  -     Comprehensive Metabolic Panel  -     Lipid Panel  -     TSH Rfx On Abnormal To Free T4    Acquired hypothyroidism  -     CBC & Differential  -     Comprehensive Metabolic Panel  -     Lipid Panel  -     TSH Rfx On Abnormal To Free T4    Spinal stenosis of lumbar region, unspecified whether neurogenic claudication present        Discussion  Htn.  Continue current regimen.  HLD.  Continue atorvastatin.   Hypothyroidism.  Continue levothyroxine at current dose.   Acute on chronic LBP followed by pain management.  Ct of the l/s spine is ordered to further evaluate.         Future Appointments  Date Time Provider Department Center   7/19/2018 12:00 AM [facility] None   10/31/2018 8:30 AM [facility] None   11/5/2018 11:00 AM Lainey Gaffney MD [facility] None   4/22/2019 10:00 AM [facility] IN [facility]ICE[facility] None   4/22/2019 10:45 AM Jono Madden MD [facility] None

## 2018-05-01 LAB
ALBUMIN SERPL-MCNC: 4.1 G/DL (ref 3.5–5.2)
ALBUMIN/GLOB SERPL: 1.8 G/DL
ALP SERPL-CCNC: 87 U/L (ref 39–117)
ALT SERPL-CCNC: 11 U/L (ref 1–41)
AST SERPL-CCNC: 19 U/L (ref 1–40)
BASOPHILS # BLD AUTO: 0.03 10*3/MM3 (ref 0–0.2)
BASOPHILS NFR BLD AUTO: 0.5 % (ref 0–1.5)
BILIRUB SERPL-MCNC: 0.4 MG/DL (ref 0.1–1.2)
BUN SERPL-MCNC: 25 MG/DL (ref 8–23)
BUN/CREAT SERPL: 26 (ref 7–25)
CALCIUM SERPL-MCNC: 9.3 MG/DL (ref 8.6–10.5)
CHLORIDE SERPL-SCNC: 103 MMOL/L (ref 98–107)
CHOLEST SERPL-MCNC: 121 MG/DL (ref 0–200)
CO2 SERPL-SCNC: 22.8 MMOL/L (ref 22–29)
CREAT SERPL-MCNC: 0.96 MG/DL (ref 0.76–1.27)
EOSINOPHIL # BLD AUTO: 0.27 10*3/MM3 (ref 0–0.7)
EOSINOPHIL NFR BLD AUTO: 4.3 % (ref 0.3–6.2)
ERYTHROCYTE [DISTWIDTH] IN BLOOD BY AUTOMATED COUNT: 14 % (ref 11.5–14.5)
GFR SERPLBLD CREATININE-BSD FMLA CKD-EPI: 77 ML/MIN/1.73
GFR SERPLBLD CREATININE-BSD FMLA CKD-EPI: 93 ML/MIN/1.73
GLOBULIN SER CALC-MCNC: 2.3 GM/DL
GLUCOSE SERPL-MCNC: 106 MG/DL (ref 65–99)
HCT VFR BLD AUTO: 47.1 % (ref 40.4–52.2)
HDLC SERPL-MCNC: 43 MG/DL (ref 40–60)
HGB BLD-MCNC: 15.5 G/DL (ref 13.7–17.6)
IMM GRANULOCYTES # BLD: 0 10*3/MM3 (ref 0–0.03)
IMM GRANULOCYTES NFR BLD: 0 % (ref 0–0.5)
LDLC SERPL CALC-MCNC: 45 MG/DL (ref 0–100)
LYMPHOCYTES # BLD AUTO: 1.51 10*3/MM3 (ref 0.9–4.8)
LYMPHOCYTES NFR BLD AUTO: 23.9 % (ref 19.6–45.3)
MCH RBC QN AUTO: 31.4 PG (ref 27–32.7)
MCHC RBC AUTO-ENTMCNC: 32.9 G/DL (ref 32.6–36.4)
MCV RBC AUTO: 95.3 FL (ref 79.8–96.2)
MONOCYTES # BLD AUTO: 0.52 10*3/MM3 (ref 0.2–1.2)
MONOCYTES NFR BLD AUTO: 8.2 % (ref 5–12)
NEUTROPHILS # BLD AUTO: 3.98 10*3/MM3 (ref 1.9–8.1)
NEUTROPHILS NFR BLD AUTO: 63.1 % (ref 42.7–76)
PLATELET # BLD AUTO: 194 10*3/MM3 (ref 140–500)
POTASSIUM SERPL-SCNC: 4.4 MMOL/L (ref 3.5–5.2)
PROT SERPL-MCNC: 6.4 G/DL (ref 6–8.5)
RBC # BLD AUTO: 4.94 10*6/MM3 (ref 4.6–6)
SODIUM SERPL-SCNC: 140 MMOL/L (ref 136–145)
TRIGL SERPL-MCNC: 166 MG/DL (ref 0–150)
TSH SERPL DL<=0.005 MIU/L-ACNC: 1.19 MIU/ML (ref 0.27–4.2)
VLDLC SERPL CALC-MCNC: 33.2 MG/DL (ref 5–40)
WBC # BLD AUTO: 6.31 10*3/MM3 (ref 4.5–10.7)

## 2018-05-04 ENCOUNTER — HOSPITAL ENCOUNTER (OUTPATIENT)
Dept: CT IMAGING | Facility: HOSPITAL | Age: 73
Discharge: HOME | End: 2018-05-04
Admitting: INTERNAL MEDICINE

## 2018-05-04 DIAGNOSIS — M54.40 BILATERAL LOW BACK PAIN WITH SCIATICA, SCIATICA LATERALITY UNSPECIFIED, UNSPECIFIED CHRONICITY: ICD-10-CM

## 2018-05-04 PROCEDURE — 72131 CT LUMBAR SPINE W/O DYE: CPT

## 2018-05-04 RX ORDER — LOSARTAN POTASSIUM 50 MG/1
TABLET ORAL
Qty: 90 TABLET | Refills: 1 | Status: SHIPPED | OUTPATIENT
Start: 2018-05-04 | End: 2018-11-03 | Stop reason: SDUPTHER

## 2018-07-05 RX ORDER — ATORVASTATIN CALCIUM 10 MG/1
TABLET, FILM COATED ORAL
Qty: 90 TABLET | Refills: 0 | Status: SHIPPED | OUTPATIENT
Start: 2018-07-05 | End: 2018-10-04 | Stop reason: SDUPTHER

## 2018-07-05 RX ORDER — LEVOTHYROXINE SODIUM 137 UG/1
TABLET ORAL
Qty: 90 TABLET | Refills: 0 | Status: SHIPPED | OUTPATIENT
Start: 2018-07-05 | End: 2018-10-04 | Stop reason: SDUPTHER

## 2018-07-19 ENCOUNTER — CLINICAL SUPPORT NO REQUIREMENTS (OUTPATIENT)
Dept: CARDIOLOGY | Facility: CLINIC | Age: 73
End: 2018-07-19

## 2018-07-19 DIAGNOSIS — I44.2 THIRD DEGREE AV BLOCK (HCC): Primary | ICD-10-CM

## 2018-07-19 PROCEDURE — 93296 REM INTERROG EVL PM/IDS: CPT | Performed by: INTERNAL MEDICINE

## 2018-07-19 PROCEDURE — 93294 REM INTERROG EVL PM/LDLS PM: CPT | Performed by: INTERNAL MEDICINE

## 2018-09-26 ENCOUNTER — OFFICE VISIT (OUTPATIENT)
Dept: INTERNAL MEDICINE | Facility: CLINIC | Age: 73
End: 2018-09-26

## 2018-09-26 VITALS
WEIGHT: 232 LBS | TEMPERATURE: 98.2 F | HEART RATE: 75 BPM | SYSTOLIC BLOOD PRESSURE: 150 MMHG | DIASTOLIC BLOOD PRESSURE: 90 MMHG | BODY MASS INDEX: 35.28 KG/M2 | OXYGEN SATURATION: 95 %

## 2018-09-26 DIAGNOSIS — J20.8 ACUTE BRONCHITIS DUE TO OTHER SPECIFIED ORGANISMS: Primary | ICD-10-CM

## 2018-09-26 PROCEDURE — 99213 OFFICE O/P EST LOW 20 MIN: CPT | Performed by: INTERNAL MEDICINE

## 2018-09-26 RX ORDER — DULOXETIN HYDROCHLORIDE 30 MG/1
60 CAPSULE, DELAYED RELEASE ORAL 2 TIMES DAILY
COMMUNITY
Start: 2018-09-19 | End: 2020-11-03

## 2018-09-26 RX ORDER — FORMOTEROL FUMARATE DIHYDRATE 20 UG/2ML
SOLUTION RESPIRATORY (INHALATION)
Refills: 11 | COMMUNITY
Start: 2018-08-14 | End: 2020-01-17

## 2018-09-26 RX ORDER — DOXYCYCLINE 100 MG/1
100 CAPSULE ORAL EVERY 12 HOURS SCHEDULED
Qty: 20 CAPSULE | Refills: 0 | Status: SHIPPED | OUTPATIENT
Start: 2018-09-26 | End: 2018-11-05

## 2018-09-26 NOTE — PROGRESS NOTES
Subjective     Javier San is a 73 y.o. male who presents with   Chief Complaint   Patient presents with   • Cough   • URI       History of Present Illness     C/o cough for the past ten days.  Cough with production.  Low grade fever.  Wheezing and SOA.  Head and sinus congestion are associated.  OTCs no help.      Review of Systems   Constitutional: Positive for fatigue.       The following portions of the patient's history were reviewed and updated as appropriate: allergies, current medications and problem list.    Patient Active Problem List    Diagnosis Date Noted   • History of pacemaker 04/16/2018   • Parkinson disease (CMS/HCC) 10/30/2017   • Benign prostatic hyperplasia with lower urinary tract symptoms 12/07/2016   • Hyperlipemia 10/27/2016   • Thyroid nodule 10/26/2016     Note Last Updated: 10/31/2017     Noted on CT chest 3/2016.  Stable 9/2016.  Followed by ENT.       • AV block, Mobitz II 06/17/2016   • History of melanoma 04/15/2016     Note Last Updated: 4/15/2016     Times two.       • Atopic rhinitis 03/01/2016   • Arthritis 03/01/2016   • Asthma 03/01/2016   • Chronic bronchitis (CMS/HCC) 03/01/2016   • Degeneration of intervertebral disc of lumbar region 03/01/2016   • Essential hypertension 03/01/2016   • Hypothyroidism 03/01/2016   • Impaired fasting glucose 03/01/2016   • Lumbar radiculopathy 03/01/2016   • Spinal stenosis of lumbar region 03/01/2016   • Lung nodule 03/01/2016     Note Last Updated: 10/31/2017     Follow over two years by Dr. Garnica.      • Obstructive sleep apnea syndrome 03/01/2016     Note Last Updated: 3/1/2016     Description: Patient is maintained on BIPAP.         Current Outpatient Prescriptions on File Prior to Visit   Medication Sig Dispense Refill   • acetaminophen (TYLENOL) 325 MG tablet Take 650 mg by mouth Every 6 (Six) Hours As Needed for mild pain (1-3).     • albuterol (ACCUNEB) 1.25 MG/3ML nebulizer solution Take 1 ampule by nebulization Every 6 (Six)  Hours As Needed for wheezing.     • albuterol (PROVENTIL HFA;VENTOLIN HFA) 108 (90 BASE) MCG/ACT inhaler Inhale 2 puffs Every 6 (Six) Hours As Needed. ProAir  (90 Base) MCG/ACT Inhalation Aerosol Solution; Patient Sig: ProAir  (90 Base) MCG/ACT Inhalation Aerosol Solution INHALE 1 TO 2 PUFFS EVERY 4 TO 6 HOURS AS NEEDED.; 0; 29-Sep-2014; Active     • atorvastatin (LIPITOR) 10 MG tablet TAKE 1 TABLET BY MOUTH EVERY DAY 90 tablet 0   • azelastine (ASTELIN) 0.1 % nasal spray 1 spray into each nostril 2 (Two) Times a Day. Use 1 to 2 sprays     • carbidopa-levodopa (SINEMET)  MG per tablet TK 1 T PO four time daily  2   • carbidopa-levodopa CR (SINEMET CR)  MG per CR tablet Take 2 tablets by mouth.     • fluticasone (FLONASE) 50 MCG/ACT nasal spray 2 sprays into each nostril daily.     • Fluticasone Furoate-Vilanterol (BREO ELLIPTA IN) Inhale.     • levothyroxine (SYNTHROID, LEVOTHROID) 137 MCG tablet TAKE 1 TABLET BY MOUTH EVERY DAY 90 tablet 0   • losartan (COZAAR) 50 MG tablet TAKE 1 TABLET BY MOUTH EVERY DAY 90 tablet 1   • montelukast (SINGULAIR) 10 MG tablet Take 1 tablet by mouth daily.     • MULTIPLE VITAMINS PO Take 1 tablet/day by mouth Daily.     • sildenafil (VIAGRA) 25 MG tablet Take 25 mg by mouth daily as needed for erectile dysfunction.     • vitamin C (ASCORBIC ACID) 250 MG tablet Take 250 mg by mouth daily.       No current facility-administered medications on file prior to visit.        Objective     /90   Pulse 75   Temp 98.2 °F (36.8 °C)   Wt 105 kg (232 lb)   SpO2 95%   BMI 35.28 kg/m²     Physical Exam   Constitutional: He is oriented to person, place, and time. He appears well-developed and well-nourished.   HENT:   Head: Atraumatic.   Right Ear: Hearing and tympanic membrane normal.   Left Ear: Hearing and tympanic membrane normal.   Mouth/Throat: No oropharyngeal exudate or posterior oropharyngeal erythema.   Cardiovascular: Normal rate, regular rhythm and  normal heart sounds.    Pulmonary/Chest: Effort normal and breath sounds normal.   Neurological: He is alert and oriented to person, place, and time.   Skin: Skin is warm and dry.   Psychiatric: He has a normal mood and affect.       Assessment/Plan   Javier was seen today for cough and uri.    Diagnoses and all orders for this visit:    Acute bronchitis due to other specified organisms    Other orders  -     doxycycline (MONODOX) 100 MG capsule; Take 1 capsule by mouth Every 12 (Twelve) Hours.        Discussion  Patient presents with episodes of acute bronchitis.  A prescription for antibiotics is provided today.  The patient is instructed to take along with Mucinex DM and Breo.  Let me know they are not feeling better over the next 3 days or if there is any change in symptoms.             Future Appointments  Date Time Provider Department Center   10/22/2018 10:30 AM [facility], [facility] None   10/31/2018 8:30 AM [facility] None   11/5/2018 11:15 AM Lainey Gaffney MD [facility] None   4/22/2019 10:00 AM [facility] None   4/22/2019 10:45 AM Jnoo Madden MD [facility] None

## 2018-10-04 RX ORDER — LEVOTHYROXINE SODIUM 137 UG/1
TABLET ORAL
Qty: 90 TABLET | Refills: 0 | Status: SHIPPED | OUTPATIENT
Start: 2018-10-04 | End: 2019-01-13 | Stop reason: SDUPTHER

## 2018-10-04 RX ORDER — ATORVASTATIN CALCIUM 10 MG/1
TABLET, FILM COATED ORAL
Qty: 90 TABLET | Refills: 0 | Status: SHIPPED | OUTPATIENT
Start: 2018-10-04 | End: 2019-01-13 | Stop reason: SDUPTHER

## 2018-10-22 ENCOUNTER — CLINICAL SUPPORT NO REQUIREMENTS (OUTPATIENT)
Dept: CARDIOLOGY | Facility: CLINIC | Age: 73
End: 2018-10-22

## 2018-10-22 DIAGNOSIS — I44.2 COMPLETE HEART BLOCK (HCC): Primary | ICD-10-CM

## 2018-10-22 PROCEDURE — 93280 PM DEVICE PROGR EVAL DUAL: CPT | Performed by: INTERNAL MEDICINE

## 2018-10-30 DIAGNOSIS — Z00.00 ANNUAL PHYSICAL EXAM: Primary | ICD-10-CM

## 2018-10-30 DIAGNOSIS — R73.01 IMPAIRED FASTING GLUCOSE: ICD-10-CM

## 2018-10-30 DIAGNOSIS — E78.00 PURE HYPERCHOLESTEROLEMIA: ICD-10-CM

## 2018-10-30 DIAGNOSIS — E03.9 ACQUIRED HYPOTHYROIDISM: ICD-10-CM

## 2018-10-30 DIAGNOSIS — I10 ESSENTIAL HYPERTENSION: ICD-10-CM

## 2018-11-01 LAB
ALBUMIN SERPL-MCNC: 4.5 G/DL (ref 3.5–5.2)
ALBUMIN/CREAT UR: <7.1 MG/G CREAT (ref 0–30)
ALBUMIN/GLOB SERPL: 2.1 G/DL
ALP SERPL-CCNC: 82 U/L (ref 39–117)
ALT SERPL-CCNC: 9 U/L (ref 1–41)
APPEARANCE UR: CLEAR
AST SERPL-CCNC: 15 U/L (ref 1–40)
BACTERIA #/AREA URNS HPF: NORMAL /HPF
BASOPHILS # BLD AUTO: 0.04 10*3/MM3 (ref 0–0.2)
BASOPHILS NFR BLD AUTO: 0.6 % (ref 0–1.5)
BILIRUB SERPL-MCNC: 0.6 MG/DL (ref 0.1–1.2)
BILIRUB UR QL STRIP: NEGATIVE
BUN SERPL-MCNC: 17 MG/DL (ref 8–23)
BUN/CREAT SERPL: 15.9 (ref 7–25)
CALCIUM SERPL-MCNC: 9.5 MG/DL (ref 8.6–10.5)
CHLORIDE SERPL-SCNC: 103 MMOL/L (ref 98–107)
CHOLEST SERPL-MCNC: 136 MG/DL (ref 0–200)
CO2 SERPL-SCNC: 29.2 MMOL/L (ref 22–29)
COLOR UR: YELLOW
CREAT SERPL-MCNC: 1.07 MG/DL (ref 0.76–1.27)
CREAT UR-MCNC: 42.1 MG/DL
EOSINOPHIL # BLD AUTO: 0.31 10*3/MM3 (ref 0–0.7)
EOSINOPHIL NFR BLD AUTO: 4.9 % (ref 0.3–6.2)
EPI CELLS #/AREA URNS HPF: NORMAL /HPF
ERYTHROCYTE [DISTWIDTH] IN BLOOD BY AUTOMATED COUNT: 13.4 % (ref 11.5–14.5)
GLOBULIN SER CALC-MCNC: 2.1 GM/DL
GLUCOSE SERPL-MCNC: 97 MG/DL (ref 65–99)
GLUCOSE UR QL: NEGATIVE
HBA1C MFR BLD: 5.4 % (ref 4.8–5.6)
HCT VFR BLD AUTO: 48.9 % (ref 40.4–52.2)
HDLC SERPL-MCNC: 49 MG/DL (ref 40–60)
HGB BLD-MCNC: 15.4 G/DL (ref 13.7–17.6)
HGB UR QL STRIP: NEGATIVE
IMM GRANULOCYTES # BLD: 0 10*3/MM3 (ref 0–0.03)
IMM GRANULOCYTES NFR BLD: 0 % (ref 0–0.5)
KETONES UR QL STRIP: NEGATIVE
LDLC SERPL CALC-MCNC: 64 MG/DL (ref 0–100)
LEUKOCYTE ESTERASE UR QL STRIP: NEGATIVE
LYMPHOCYTES # BLD AUTO: 1.54 10*3/MM3 (ref 0.9–4.8)
LYMPHOCYTES NFR BLD AUTO: 24.2 % (ref 19.6–45.3)
MCH RBC QN AUTO: 30.4 PG (ref 27–32.7)
MCHC RBC AUTO-ENTMCNC: 31.5 G/DL (ref 32.6–36.4)
MCV RBC AUTO: 96.4 FL (ref 79.8–96.2)
MICRO URNS: NORMAL
MICRO URNS: NORMAL
MICROALBUMIN UR-MCNC: <3 UG/ML
MONOCYTES # BLD AUTO: 0.81 10*3/MM3 (ref 0.2–1.2)
MONOCYTES NFR BLD AUTO: 12.7 % (ref 5–12)
NEUTROPHILS # BLD AUTO: 3.66 10*3/MM3 (ref 1.9–8.1)
NEUTROPHILS NFR BLD AUTO: 57.6 % (ref 42.7–76)
NITRITE UR QL STRIP: NEGATIVE
PH UR STRIP: 7 [PH] (ref 5–7.5)
PLATELET # BLD AUTO: 191 10*3/MM3 (ref 140–500)
POTASSIUM SERPL-SCNC: 4.5 MMOL/L (ref 3.5–5.2)
PROT SERPL-MCNC: 6.6 G/DL (ref 6–8.5)
PROT UR QL STRIP: NEGATIVE
RBC # BLD AUTO: 5.07 10*6/MM3 (ref 4.6–6)
RBC #/AREA URNS HPF: NORMAL /HPF
SODIUM SERPL-SCNC: 143 MMOL/L (ref 136–145)
SP GR UR: 1.01 (ref 1–1.03)
T4 FREE SERPL-MCNC: 1.35 NG/DL (ref 0.93–1.7)
TRIGL SERPL-MCNC: 117 MG/DL (ref 0–150)
TSH SERPL DL<=0.005 MIU/L-ACNC: 5.21 MIU/ML (ref 0.27–4.2)
URINALYSIS REFLEX: NORMAL
UROBILINOGEN UR STRIP-MCNC: 0.2 MG/DL (ref 0.2–1)
VLDLC SERPL CALC-MCNC: 23.4 MG/DL (ref 5–40)
WBC # BLD AUTO: 6.36 10*3/MM3 (ref 4.5–10.7)
WBC #/AREA URNS HPF: NORMAL /HPF

## 2018-11-05 ENCOUNTER — OFFICE VISIT (OUTPATIENT)
Dept: INTERNAL MEDICINE | Facility: CLINIC | Age: 73
End: 2018-11-05

## 2018-11-05 VITALS
SYSTOLIC BLOOD PRESSURE: 114 MMHG | WEIGHT: 235 LBS | HEART RATE: 86 BPM | OXYGEN SATURATION: 96 % | DIASTOLIC BLOOD PRESSURE: 78 MMHG | HEIGHT: 68 IN | BODY MASS INDEX: 35.61 KG/M2

## 2018-11-05 DIAGNOSIS — E03.9 ACQUIRED HYPOTHYROIDISM: ICD-10-CM

## 2018-11-05 DIAGNOSIS — Z00.00 MEDICARE ANNUAL WELLNESS VISIT, SUBSEQUENT: Primary | ICD-10-CM

## 2018-11-05 DIAGNOSIS — G20 PARKINSON DISEASE (HCC): ICD-10-CM

## 2018-11-05 DIAGNOSIS — I10 ESSENTIAL HYPERTENSION: ICD-10-CM

## 2018-11-05 DIAGNOSIS — E78.00 PURE HYPERCHOLESTEROLEMIA: ICD-10-CM

## 2018-11-05 DIAGNOSIS — R53.83 OTHER FATIGUE: ICD-10-CM

## 2018-11-05 DIAGNOSIS — J42 CHRONIC BRONCHITIS, UNSPECIFIED CHRONIC BRONCHITIS TYPE (HCC): ICD-10-CM

## 2018-11-05 PROBLEM — E66.01 MORBIDLY OBESE (HCC): Status: RESOLVED | Noted: 2018-11-05 | Resolved: 2018-11-05

## 2018-11-05 PROBLEM — E66.01 MORBIDLY OBESE (HCC): Status: ACTIVE | Noted: 2018-11-05

## 2018-11-05 PROCEDURE — G0439 PPPS, SUBSEQ VISIT: HCPCS | Performed by: INTERNAL MEDICINE

## 2018-11-05 PROCEDURE — 99214 OFFICE O/P EST MOD 30 MIN: CPT | Performed by: INTERNAL MEDICINE

## 2018-11-05 RX ORDER — HYDROCODONE BITARTRATE AND ACETAMINOPHEN 7.5; 325 MG/1; MG/1
TABLET ORAL
Refills: 0 | COMMUNITY
Start: 2018-10-15

## 2018-11-05 RX ORDER — PREGABALIN 150 MG/1
150 CAPSULE ORAL
COMMUNITY
Start: 2018-11-01 | End: 2019-06-05

## 2018-11-05 RX ORDER — LOSARTAN POTASSIUM 50 MG/1
TABLET ORAL
Qty: 90 TABLET | Refills: 0 | Status: SHIPPED | OUTPATIENT
Start: 2018-11-05 | End: 2019-02-04 | Stop reason: SDUPTHER

## 2018-11-05 RX ORDER — CHOLECALCIFEROL (VITAMIN D3) 50 MCG
2000 TABLET ORAL DAILY
COMMUNITY

## 2018-11-05 NOTE — PATIENT INSTRUCTIONS
Medicare Wellness  Personal Prevention Plan of Service     Date of Office Visit:  2018  Encounter Provider:  Lainey Gaffney MD  Place of Service:  [facility] INTERNAL MEDICINE  Patient Name: Javier San  :  1945    As part of the Medicare Wellness portion of your visit today, we are providing you with this personalized preventive plan of services (PPPS). This plan is based upon recommendations of the [place] Preventive Services Task Force (USPSTF) and the Advisory Committee on Immunization Practices (ACIP).    This lists the preventive care services that should be considered, and provides dates of when you are due. Items listed as completed are up-to-date and do not require any further intervention.    Health Maintenance   Topic Date Due   • ZOSTER VACCINE (2 of 3) 2010   • INFLUENZA VACCINE  2018   • MEDICARE ANNUAL WELLNESS  10/30/2018   • LIPID PANEL  10/31/2019   • COLONOSCOPY  2023   • TDAP/TD VACCINES (2 - Td) 2023   • HEPATITIS C SCREENING  Addressed   • PNEUMOCOCCAL VACCINES (65+ LOW/MEDIUM RISK)  Completed   • AAA SCREEN (ONE-TIME)  Completed       No orders of the defined types were placed in this encounter.      No Follow-up on file.

## 2018-11-05 NOTE — PROGRESS NOTES
Subjective     Javier San is a 73 y.o. male who presents for an annual wellness visit as well as check up of htn, hld, hypothyroidism.        History of Present Illness     HTN.  Much better control since pacer placemnent.  HLD. Excellent control on atorvastatin.    Hypthyroidism.  Under good control.   Dr. Lowry follows his parkinson's disease.    Chronic bronchitis.  Mild cough but overall stable.      Review of Systems   Respiratory: Positive for cough.    Cardiovascular: Negative.    Musculoskeletal: Positive for back pain.       The following portions of the patient's history were reviewed and updated as appropriate: allergies, current medications, past family history, past medical history, past social history, past surgical history and problem list.  Health maintenance tab was reviewed and updated with the patient.       Patient Active Problem List    Diagnosis Date Noted   • History of pacemaker 04/16/2018   • Parkinson disease (CMS/HCC) 10/30/2017   • Benign prostatic hyperplasia with lower urinary tract symptoms 12/07/2016   • Hyperlipemia 10/27/2016   • Thyroid nodule 10/26/2016     Note Last Updated: 10/31/2017     Noted on CT chest 3/2016.  Stable 9/2016.  Followed by ENT.       • AV block, Mobitz II 06/17/2016   • History of melanoma 04/15/2016     Note Last Updated: 4/15/2016     Times two.       • Atopic rhinitis 03/01/2016   • Arthritis 03/01/2016   • Asthma 03/01/2016   • Chronic bronchitis (CMS/HCC) 03/01/2016   • Degeneration of intervertebral disc of lumbar region 03/01/2016   • Essential hypertension 03/01/2016   • Hypothyroidism 03/01/2016   • Impaired fasting glucose 03/01/2016   • Lumbar radiculopathy 03/01/2016   • Spinal stenosis of lumbar region 03/01/2016   • Lung nodule 03/01/2016     Note Last Updated: 10/31/2017     Follow over two years by Dr. Garnica.      • Obstructive sleep apnea syndrome 03/01/2016     Note Last Updated: 3/1/2016     Description: Patient is maintained on  BIPAP.         Past Medical History:   Diagnosis Date   • Abnormal blood chemistry    • Abnormal liver enzymes    • Acute bacterial prostatitis    • Anemia    • Anxiety 03/24/2014    Warning,Lainey   • Arthritis    • Asthma 3/1/2016   • Basal cell carcinoma    • Chronic bronchitis (CMS/HCC) 3/1/2016   • Gynecomastia    • H/O degenerative disc disease    • H/O hernia repair    • Hypertension 3/1/2016    Impression: 04/08/2015 - .  Impression: 03/24/2014 - Controlled with HCTZ.;    • Hypokalemia    • Hypothyroidism    • Labile hypertension 03/01/2016    with periods of orthostasis and near syncope   • Left bundle branch block    • Low back pain    • Lumbar radiculopathy    • Lumbar stenosis    • Lung nodule     CT of chest 2/9/15   • Malignant neoplasm of cheek (CMS/HCC) 3/1/2016    Description: - Basal Cell   • Mobitz (type) II atrioventricular block     s/p [employer] dual chamber PPM 2016   • Obstructive sleep apnea    • Parkinson disease (CMS/HCC)    • Rupture of kidney     in high school football   • Skin cancer of nose     Basal Cell   • Stricture of ureter     WITH HYDRONEPHROSIS   • Testicular pain    • Venous insufficiency    • Vitamin D deficiency        Past Surgical History:   Procedure Laterality Date   • BACK SURGERY      L4-L5 with Dr. Solomon   • CARDIAC ELECTROPHYSIOLOGY PROCEDURE N/A 6/17/2016    Procedure: Pacemaker DC new  [place];  Surgeon: Zachary Narayanan MD;  Location: [facility] LOCATION;  Service:    • COLON SURGERY      Complete colonoscopy- Dr. knox/[facility]   • CYSTOSCOPY TRANSURETHRAL RESECTION OF PROSTATE     • CYSTOSCOPY TRANSURETHRAL RESECTION OF PROSTATE N/A 12/7/2016    Procedure: CYSTOSCOPY TRANSURETHRAL RESECTION OF PROSTATE;  Surgeon: Law Dee MD;  Location: [facility] OR;  Service:    • HERNIA REPAIR     • NASAL SEPTUM SURGERY     • NOSE SURGERY      deviated septum   • PROSTATE SURGERY      transurethral destruction prostate tissue   • REPLACEMENT TOTAL KNEE  BILATERAL      right in 2008 and left in 2005   • TONSILLECTOMY     • TOTAL SHOULDER REPLACEMENT Bilateral     left in 2011 and right in 2013       Family History   Problem Relation Age of Onset   • Alcohol abuse Mother    • Cancer Mother         skin   • Stroke Mother    • Alcohol abuse Father    • Emphysema Father    • COPD Father    • Cancer Father         skin       Social History     Social History   • Marital status:      Spouse name: N/A   • Number of children: N/A   • Years of education: N/A     Occupational History   • Not on file.     Social History Main Topics   • Smoking status: Former Smoker     Packs/day: 1.00     Types: Cigarettes     Start date: 6/16/1964     Quit date: 6/16/1984   • Smokeless tobacco: Never Used      Comment: 20 year smoker/ CAFFEINE USE: 1-2 SOFT DRINKS DAILY.    • Alcohol use Yes      Comment: social, 4 beers a year   • Drug use: No   • Sexual activity: Defer     Other Topics Concern   • Not on file     Social History Narrative   • No narrative on file       Current Outpatient Prescriptions on File Prior to Visit   Medication Sig Dispense Refill   • albuterol (ACCUNEB) 1.25 MG/3ML nebulizer solution Take 1 ampule by nebulization Every 6 (Six) Hours As Needed for wheezing.     • albuterol (PROVENTIL HFA;VENTOLIN HFA) 108 (90 BASE) MCG/ACT inhaler Inhale 2 puffs Every 6 (Six) Hours As Needed. ProAir  (90 Base) MCG/ACT Inhalation Aerosol Solution; Patient Sig: ProAir  (90 Base) MCG/ACT Inhalation Aerosol Solution INHALE 1 TO 2 PUFFS EVERY 4 TO 6 HOURS AS NEEDED.; 0; 29-Sep-2014; Active     • atorvastatin (LIPITOR) 10 MG tablet TAKE 1 TABLET BY MOUTH EVERY DAY 90 tablet 0   • azelastine (ASTELIN) 0.1 % nasal spray 1 spray into each nostril 2 (Two) Times a Day. Use 1 to 2 sprays     • carbidopa-levodopa (SINEMET)  MG per tablet TK 1 T PO four time daily  2   • carbidopa-levodopa CR (SINEMET CR)  MG per CR tablet Take 2 tablets by mouth.     • DULoxetine  "(CYMBALTA) 30 MG capsule Take 30 mg by mouth.     • fluticasone (FLONASE) 50 MCG/ACT nasal spray 2 sprays into each nostril daily.     • Fluticasone Furoate-Vilanterol 100-25 MCG/INH aerosol powder  Breo Ellipta 100 mcg-25 mcg/dose powder for inhalation     • levothyroxine (SYNTHROID, LEVOTHROID) 137 MCG tablet TAKE 1 TABLET BY MOUTH EVERY DAY 90 tablet 0   • losartan (COZAAR) 50 MG tablet TAKE 1 TABLET BY MOUTH EVERY DAY 90 tablet 0   • LYRICA 75 MG capsule TK 1 C PO BID  1   • montelukast (SINGULAIR) 10 MG tablet Take 1 tablet by mouth daily.     • MULTIPLE VITAMINS PO Take 1 tablet/day by mouth Daily.     • PERFOROMIST 20 MCG/2ML nebulizer solution INHALE 1 VIAL VIA NEB BID  11   • sildenafil (VIAGRA) 25 MG tablet Take 25 mg by mouth daily as needed for erectile dysfunction.     • vitamin C (ASCORBIC ACID) 250 MG tablet Take 250 mg by mouth daily.     • acetaminophen (TYLENOL) 325 MG tablet Take 650 mg by mouth Every 6 (Six) Hours As Needed for mild pain (1-3).     • [DISCONTINUED] doxycycline (MONODOX) 100 MG capsule Take 1 capsule by mouth Every 12 (Twelve) Hours. 20 capsule 0   • [DISCONTINUED] Fluticasone Furoate-Vilanterol (BREO ELLIPTA IN) Inhale.       No current facility-administered medications on file prior to visit.        Allergies   Allergen Reactions   • Levofloxacin Myalgia       Immunization History   Administered Date(s) Administered   • Flu Mist 10/21/2015   • Flu Vaccine High Dose PF 65YR+ 09/26/2017   • Pneumococcal Conjugate 13-Valent (PCV13) 10/21/2015   • Pneumococcal Polysaccharide (PPSV23) 10/01/2015   • Tdap 06/23/2013   • Zostavax 02/01/2010       Objective     /78   Pulse 86   Ht 172.7 cm (67.99\")   Wt 107 kg (235 lb)   SpO2 96%   BMI 35.74 kg/m²     Physical Exam   Constitutional: He is oriented to person, place, and time. He appears well-developed and well-nourished.   HENT:   Head: Normocephalic and atraumatic.   Right Ear: Hearing and tympanic membrane normal.   Left " Ear: Hearing and tympanic membrane normal.   Mouth/Throat: No posterior oropharyngeal erythema.   Neck: Neck supple. No thyromegaly present.   Cardiovascular: Normal rate, regular rhythm and normal heart sounds.    No murmur heard.  Pulmonary/Chest: Effort normal and breath sounds normal.   Abdominal: Soft. He exhibits no distension. There is no hepatosplenomegaly. There is no tenderness.   Lymphadenopathy:     He has no cervical adenopathy.   Neurological: He is alert and oriented to person, place, and time.   Skin: Skin is warm and dry.   Psychiatric: He has a normal mood and affect. His speech is normal and behavior is normal. Judgment and thought content normal. Cognition and memory are normal.       Assessment/Plan   Javier was seen today for medicare wellness-subsequent.    Diagnoses and all orders for this visit:    Medicare annual wellness visit, subsequent    Essential hypertension    Pure hypercholesterolemia    Acquired hypothyroidism    Chronic bronchitis, unspecified chronic bronchitis type (CMS/HCC)    Parkinson disease (CMS/HCC)        Discussion    AWV.  See scanned forms and/or computer for antolin history, PHQ-9, functional ability questionnaire, cognitive impairment screening.  Direct observation of cognitive abilities:  The patient does not exhibit any impairment in cognitive abilities upon direct observation at today's visit.     These were all reviewed with the patient and the patient was provided with a personal prevention plan of service in patient instructions.  Patient was given advice or handouts on the following topics:  nutrition, exercise  Htn.  Continue current regimen.  HLD.  Continue atorvastatin.   Hypothyroidism.  Continue levothyroxine at current dose.   Parkinson's disease.  Continue with neurology.  Chronic bronchitis.  Stable with inhalers.  I have recommended that the patient get the following immunizations:  Shingrix.        Health Maintenance   Topic Date Due   • ZOSTER  VACCINE (2 of 3) 03/29/2010   • INFLUENZA VACCINE  08/01/2018   • MEDICARE ANNUAL WELLNESS  10/30/2018   • LIPID PANEL  10/31/2019   • COLONOSCOPY  05/01/2023   • TDAP/TD VACCINES (2 - Td) 06/23/2023   • HEPATITIS C SCREENING  Addressed   • PNEUMOCOCCAL VACCINES (65+ LOW/MEDIUM RISK)  Completed   • AAA SCREEN (ONE-TIME)  Completed            Future Appointments  Date Time Provider Department Center   1/21/2019 12:00 AM [facility] None   4/22/2019 10:00 AM [facility] None   4/22/2019 10:45 AM Jono Madden MD [facility] None

## 2018-11-06 ENCOUNTER — TELEPHONE (OUTPATIENT)
Dept: INTERNAL MEDICINE | Facility: CLINIC | Age: 73
End: 2018-11-06

## 2018-11-06 LAB — VIT B12 SERPL-MCNC: 1341 PG/ML (ref 211–946)

## 2018-11-06 RX ORDER — DOXYCYCLINE 100 MG/1
100 CAPSULE ORAL EVERY 12 HOURS SCHEDULED
Qty: 20 CAPSULE | Refills: 0 | Status: SHIPPED | OUTPATIENT
Start: 2018-11-06 | End: 2019-01-18

## 2018-11-06 NOTE — TELEPHONE ENCOUNTER
Patient asking for antibiotic from yesterday he has changed his mind. TAYLOR    I called in for him.  DEMI

## 2019-01-14 RX ORDER — ATORVASTATIN CALCIUM 10 MG/1
TABLET, FILM COATED ORAL
Qty: 90 TABLET | Refills: 0 | Status: SHIPPED | OUTPATIENT
Start: 2019-01-14 | End: 2019-04-15 | Stop reason: SDUPTHER

## 2019-01-14 RX ORDER — LEVOTHYROXINE SODIUM 137 UG/1
TABLET ORAL
Qty: 90 TABLET | Refills: 0 | Status: SHIPPED | OUTPATIENT
Start: 2019-01-14 | End: 2019-04-15 | Stop reason: SDUPTHER

## 2019-01-18 ENCOUNTER — OFFICE VISIT (OUTPATIENT)
Dept: INTERNAL MEDICINE | Facility: CLINIC | Age: 74
End: 2019-01-18

## 2019-01-18 VITALS
TEMPERATURE: 97.8 F | HEART RATE: 75 BPM | BODY MASS INDEX: 36.96 KG/M2 | DIASTOLIC BLOOD PRESSURE: 80 MMHG | WEIGHT: 243 LBS | SYSTOLIC BLOOD PRESSURE: 120 MMHG | OXYGEN SATURATION: 98 %

## 2019-01-18 DIAGNOSIS — I99.8 LIMB ISCHEMIA: ICD-10-CM

## 2019-01-18 DIAGNOSIS — R06.02 SOB (SHORTNESS OF BREATH): ICD-10-CM

## 2019-01-18 DIAGNOSIS — I73.9 PVD (PERIPHERAL VASCULAR DISEASE) (HCC): ICD-10-CM

## 2019-01-18 DIAGNOSIS — J20.8 ACUTE BRONCHITIS DUE TO OTHER SPECIFIED ORGANISMS: Primary | ICD-10-CM

## 2019-01-18 PROCEDURE — 99214 OFFICE O/P EST MOD 30 MIN: CPT | Performed by: INTERNAL MEDICINE

## 2019-01-18 RX ORDER — DOXYCYCLINE 100 MG/1
100 CAPSULE ORAL EVERY 12 HOURS SCHEDULED
Qty: 20 CAPSULE | Refills: 0 | Status: SHIPPED | OUTPATIENT
Start: 2019-01-18 | End: 2019-03-01

## 2019-01-18 RX ORDER — METHYLPREDNISOLONE 4 MG/1
TABLET ORAL
Qty: 21 TABLET | Refills: 0 | Status: SHIPPED | OUTPATIENT
Start: 2019-01-18 | End: 2019-03-01

## 2019-01-18 NOTE — PROGRESS NOTES
Subjective     Javier San is a 73 y.o. male who presents with   Chief Complaint   Patient presents with   • Cough   • URI       History of Present Illness     Six days of symptoms.  Cough with purulent sputum.  No fever.  SOA and wheezing associated.  Mucinex no help.  Nebulizer helpful.  Moderate severity of progressive symptoms.  Head and nasal congestion is associated.      Ten days ago left hand turned ice cold and blue.  Doing nothing at the time.  Came on sitting in a chair.  Unsure how he had had situated.  He started to pump hand and move it.  It improved.  Happened again.  Moved it around and it went away.  Each time lasted less than minute.      Review of Systems    The following portions of the patient's history were reviewed and updated as appropriate: allergies, current medications and problem list.    Patient Active Problem List    Diagnosis Date Noted   • History of pacemaker 04/16/2018   • Parkinson disease (CMS/HCC) 10/30/2017   • Benign prostatic hyperplasia with lower urinary tract symptoms 12/07/2016   • Hyperlipemia 10/27/2016   • Thyroid nodule 10/26/2016     Note Last Updated: 10/31/2017     Noted on CT chest 3/2016.  Stable 9/2016.  Followed by ENT.       • AV block, Mobitz II 06/17/2016   • History of melanoma 04/15/2016     Note Last Updated: 4/15/2016     Times two.       • Atopic rhinitis 03/01/2016   • Arthritis 03/01/2016   • Asthma 03/01/2016   • Chronic bronchitis (CMS/HCC) 03/01/2016   • Degeneration of intervertebral disc of lumbar region 03/01/2016   • Essential hypertension 03/01/2016   • Hypothyroidism 03/01/2016   • Impaired fasting glucose 03/01/2016   • Lumbar radiculopathy 03/01/2016   • Spinal stenosis of lumbar region 03/01/2016   • Lung nodule 03/01/2016     Note Last Updated: 10/31/2017     Follow over two years by Dr. Garnica.      • Obstructive sleep apnea syndrome 03/01/2016     Note Last Updated: 3/1/2016     Description: Patient is maintained on BIPAP.          Current Outpatient Medications on File Prior to Visit   Medication Sig Dispense Refill   • acetaminophen (TYLENOL) 325 MG tablet Take 650 mg by mouth Every 6 (Six) Hours As Needed for mild pain (1-3).     • albuterol (ACCUNEB) 1.25 MG/3ML nebulizer solution Take 1 ampule by nebulization Every 6 (Six) Hours As Needed for wheezing.     • albuterol (PROVENTIL HFA;VENTOLIN HFA) 108 (90 BASE) MCG/ACT inhaler Inhale 2 puffs Every 6 (Six) Hours As Needed. ProAir  (90 Base) MCG/ACT Inhalation Aerosol Solution; Patient Sig: ProAir  (90 Base) MCG/ACT Inhalation Aerosol Solution INHALE 1 TO 2 PUFFS EVERY 4 TO 6 HOURS AS NEEDED.; 0; 29-Sep-2014; Active     • atorvastatin (LIPITOR) 10 MG tablet TAKE 1 TABLET BY MOUTH EVERY DAY 90 tablet 0   • azelastine (ASTELIN) 0.1 % nasal spray 1 spray into each nostril 2 (Two) Times a Day. Use 1 to 2 sprays     • carbidopa-levodopa (SINEMET)  MG per tablet TK 1 T PO four time daily  2   • carbidopa-levodopa CR (SINEMET CR)  MG per CR tablet Take 2 tablets by mouth.     • Cholecalciferol (VITAMIN D) 2000 units tablet Take 2,000 Units by mouth Daily.     • Cyanocobalamin (B-12 PO) Take  by mouth.     • DULoxetine (CYMBALTA) 30 MG capsule Take 30 mg by mouth.     • fluticasone (FLONASE) 50 MCG/ACT nasal spray 2 sprays into each nostril daily.     • Fluticasone Furoate-Vilanterol 100-25 MCG/INH aerosol powder  Breo Ellipta 100 mcg-25 mcg/dose powder for inhalation     • HYDROcodone-acetaminophen (NORCO) 7.5-325 MG per tablet TK 1 T PO Q DAY PRN  0   • levothyroxine (SYNTHROID, LEVOTHROID) 137 MCG tablet TAKE 1 TABLET BY MOUTH EVERY DAY 90 tablet 0   • losartan (COZAAR) 50 MG tablet TAKE 1 TABLET BY MOUTH EVERY DAY 90 tablet 0   • LYRICA 75 MG capsule TK 1 C PO BID  1   • montelukast (SINGULAIR) 10 MG tablet Take 1 tablet by mouth daily.     • MULTIPLE VITAMINS PO Take 1 tablet/day by mouth Daily.     • PERFOROMIST 20 MCG/2ML nebulizer solution INHALE 1 VIAL VIA  NEB BID  11   • pregabalin (LYRICA) 150 MG capsule Take 150 mg by mouth.     • sildenafil (VIAGRA) 25 MG tablet Take 25 mg by mouth daily as needed for erectile dysfunction.     • vitamin C (ASCORBIC ACID) 250 MG tablet Take 250 mg by mouth daily.     • [DISCONTINUED] doxycycline (MONODOX) 100 MG capsule Take 1 capsule by mouth Every 12 (Twelve) Hours. 20 capsule 0     No current facility-administered medications on file prior to visit.        Objective     /80   Pulse 75   Temp 97.8 °F (36.6 °C)   Wt 110 kg (243 lb)   SpO2 98%   BMI 36.96 kg/m²     Physical Exam   Constitutional: He is oriented to person, place, and time. He appears well-developed and well-nourished.   HENT:   Head: Atraumatic.   Right Ear: Hearing and tympanic membrane normal.   Left Ear: Hearing and tympanic membrane normal.   Mouth/Throat: No oropharyngeal exudate or posterior oropharyngeal erythema.   Cardiovascular: Normal rate, regular rhythm and normal heart sounds.   Pulses:       Radial pulses are 2+ on the right side, and 2+ on the left side.   Pulmonary/Chest: Effort normal. No respiratory distress. He has wheezes.   Neurological: He is alert and oriented to person, place, and time.   Skin: Skin is warm and dry.   Psychiatric: He has a normal mood and affect.       Assessment/Plan   Javier was seen today for cough and uri.    Diagnoses and all orders for this visit:    Acute bronchitis due to other specified organisms    SOB (shortness of breath)  -     Adult Transthoracic Echo Complete W/ Cont if Necessary Per Protocol; Future    Limb ischemia    PVD (peripheral vascular disease) (CMS/[facility])  -     Duplex Upper Extremity Art / Grafts - Left CAR; Future    Other orders  -     doxycycline (MONODOX) 100 MG capsule; Take 1 capsule by mouth Every 12 (Twelve) Hours.  -     MethylPREDNISolone (MEDROL, KENNEY,) 4 MG tablet; Take as directed on package instructions.        Discussion  Patient presents with episodes of acute bronchitis.  A  prescription for antibiotics is provided today.  The patient is instructed to take along with Mucinex DM.  Let me know they are not feeling better over the next 3 days or if there is any change in symptoms.    Two episodes of hand coldness and turning blue.  Check duplex UE and echo to further evaluate for embolic source and rule out PVD.           Future Appointments   Date Time Provider Department Center   1/21/2019 12:00 AM JARRELL REMOTE, [facility] None   4/22/2019  1:00 PM [facility]RELL IN OFFICE, [facility] None   4/22/2019  1:30 PM Sindy Hernández APRN [facility] None   5/2/2019  9:00 AM [facility] None   5/7/2019  1:00 PM Lainey Gaffney MD [facility] None

## 2019-01-21 ENCOUNTER — CLINICAL SUPPORT NO REQUIREMENTS (OUTPATIENT)
Dept: CARDIOLOGY | Facility: CLINIC | Age: 74
End: 2019-01-21

## 2019-01-21 DIAGNOSIS — I44.2 COMPLETE HEART BLOCK (HCC): Primary | ICD-10-CM

## 2019-01-21 PROCEDURE — 93294 REM INTERROG EVL PM/LDLS PM: CPT | Performed by: INTERNAL MEDICINE

## 2019-01-21 PROCEDURE — 93296 REM INTERROG EVL PM/IDS: CPT | Performed by: INTERNAL MEDICINE

## 2019-01-28 ENCOUNTER — HOSPITAL ENCOUNTER (OUTPATIENT)
Dept: CARDIOLOGY | Facility: HOSPITAL | Age: 74
Discharge: HOME | End: 2019-01-28
Admitting: INTERNAL MEDICINE

## 2019-01-28 DIAGNOSIS — I73.9 PVD (PERIPHERAL VASCULAR DISEASE) (HCC): ICD-10-CM

## 2019-01-28 LAB
BH CV UPPER ARTERIAL LEFT 2ND DIGIT SYS MAX: 121 MMHG
BH CV UPPER ARTERIAL LEFT FBI 2ND DIGIT RATIO: 0.9
BH CV UPPER ARTERIAL RIGHT 2ND DIGIT SYS MAX: 122 MMHG
BH CV UPPER ARTERIAL RIGHT FBI 2ND DIGIT RATIO: 0.91
UPPER ARTERIAL LEFT ARM BRACHIAL SYS MAX: 122 MMHG
UPPER ARTERIAL RIGHT ARM BRACHIAL SYS MAX: 134 MMHG

## 2019-01-28 PROCEDURE — 93922 UPR/L XTREMITY ART 2 LEVELS: CPT

## 2019-01-30 ENCOUNTER — HOSPITAL ENCOUNTER (OUTPATIENT)
Dept: CARDIOLOGY | Facility: HOSPITAL | Age: 74
Discharge: HOME | End: 2019-01-30
Admitting: INTERNAL MEDICINE

## 2019-01-30 VITALS
WEIGHT: 243 LBS | HEART RATE: 75 BPM | SYSTOLIC BLOOD PRESSURE: 124 MMHG | OXYGEN SATURATION: 98 % | DIASTOLIC BLOOD PRESSURE: 70 MMHG | BODY MASS INDEX: 38.14 KG/M2 | HEIGHT: 67 IN

## 2019-01-30 DIAGNOSIS — R06.02 SOB (SHORTNESS OF BREATH): ICD-10-CM

## 2019-01-30 PROCEDURE — 93306 TTE W/DOPPLER COMPLETE: CPT | Performed by: INTERNAL MEDICINE

## 2019-01-30 PROCEDURE — 93306 TTE W/DOPPLER COMPLETE: CPT

## 2019-01-30 PROCEDURE — 25010000002 PERFLUTREN (DEFINITY) 8.476 MG IN SODIUM CHLORIDE 0.9 % 10 ML INJECTION: Performed by: INTERNAL MEDICINE

## 2019-01-30 RX ADMIN — PERFLUTREN 2 ML: 6.52 INJECTION, SUSPENSION INTRAVENOUS at 10:08

## 2019-01-31 LAB
ASCENDING AORTA: 2.7 CM
BH CV ECHO MEAS - ACS: 2.1 CM
BH CV ECHO MEAS - AO MAX PG (FULL): 7.1 MMHG
BH CV ECHO MEAS - AO MAX PG: 9.6 MMHG
BH CV ECHO MEAS - AO MEAN PG (FULL): 5 MMHG
BH CV ECHO MEAS - AO MEAN PG: 6.6 MMHG
BH CV ECHO MEAS - AO ROOT AREA (BSA CORRECTED): 1.6
BH CV ECHO MEAS - AO ROOT AREA: 10.2 CM^2
BH CV ECHO MEAS - AO ROOT DIAM: 3.6 CM
BH CV ECHO MEAS - AO V2 MAX: 155.1 CM/SEC
BH CV ECHO MEAS - AO V2 MEAN: 120.9 CM/SEC
BH CV ECHO MEAS - AO V2 VTI: 32.4 CM
BH CV ECHO MEAS - ASC AORTA: 2.7 CM
BH CV ECHO MEAS - AVA(I,A): 1.5 CM^2
BH CV ECHO MEAS - AVA(I,D): 1.5 CM^2
BH CV ECHO MEAS - AVA(V,A): 1.4 CM^2
BH CV ECHO MEAS - AVA(V,D): 1.4 CM^2
BH CV ECHO MEAS - BSA(HAYCOCK): 2.3 M^2
BH CV ECHO MEAS - BSA: 2.2 M^2
BH CV ECHO MEAS - BZI_BMI: 38.1 KILOGRAMS/M^2
BH CV ECHO MEAS - BZI_METRIC_HEIGHT: 170.2 CM
BH CV ECHO MEAS - BZI_METRIC_WEIGHT: 110.2 KG
BH CV ECHO MEAS - EDV(MOD-SP2): 160 ML
BH CV ECHO MEAS - EDV(MOD-SP4): 105 ML
BH CV ECHO MEAS - EDV(TEICH): 201.6 ML
BH CV ECHO MEAS - EF(CUBED): 71.1 %
BH CV ECHO MEAS - EF(MOD-BP): 54 %
BH CV ECHO MEAS - EF(MOD-SP2): 53.1 %
BH CV ECHO MEAS - EF(MOD-SP4): 54.3 %
BH CV ECHO MEAS - EF(TEICH): 61.7 %
BH CV ECHO MEAS - ESV(MOD-SP2): 75 ML
BH CV ECHO MEAS - ESV(MOD-SP4): 48 ML
BH CV ECHO MEAS - ESV(TEICH): 77.3 ML
BH CV ECHO MEAS - IVS/LVPW: 0.92
BH CV ECHO MEAS - IVSD: 1.2 CM
BH CV ECHO MEAS - LAT PEAK E' VEL: 4 CM/SEC
BH CV ECHO MEAS - LV DIASTOLIC VOL/BSA (35-75): 47.8 ML/M^2
BH CV ECHO MEAS - LV MASS(C)D: 348.8 GRAMS
BH CV ECHO MEAS - LV MASS(C)DI: 158.8 GRAMS/M^2
BH CV ECHO MEAS - LV MAX PG: 2.5 MMHG
BH CV ECHO MEAS - LV MEAN PG: 1.6 MMHG
BH CV ECHO MEAS - LV SYSTOLIC VOL/BSA (12-30): 21.9 ML/M^2
BH CV ECHO MEAS - LV V1 MAX: 79.1 CM/SEC
BH CV ECHO MEAS - LV V1 MEAN: 59.7 CM/SEC
BH CV ECHO MEAS - LV V1 VTI: 17 CM
BH CV ECHO MEAS - LVIDD: 6.3 CM
BH CV ECHO MEAS - LVIDS: 4.2 CM
BH CV ECHO MEAS - LVLD AP2: 8.3 CM
BH CV ECHO MEAS - LVLD AP4: 7.5 CM
BH CV ECHO MEAS - LVLS AP2: 8 CM
BH CV ECHO MEAS - LVLS AP4: 7.1 CM
BH CV ECHO MEAS - LVOT AREA (M): 2.8 CM^2
BH CV ECHO MEAS - LVOT AREA: 2.8 CM^2
BH CV ECHO MEAS - LVOT DIAM: 1.9 CM
BH CV ECHO MEAS - LVPWD: 1.3 CM
BH CV ECHO MEAS - MED PEAK E' VEL: 5 CM/SEC
BH CV ECHO MEAS - MV A DUR: 0.1 SEC
BH CV ECHO MEAS - MV A MAX VEL: 85.9 CM/SEC
BH CV ECHO MEAS - MV DEC SLOPE: 358.9 CM/SEC^2
BH CV ECHO MEAS - MV DEC TIME: 0.13 SEC
BH CV ECHO MEAS - MV E MAX VEL: 59.7 CM/SEC
BH CV ECHO MEAS - MV E/A: 0.7
BH CV ECHO MEAS - MV MAX PG: 3.8 MMHG
BH CV ECHO MEAS - MV MEAN PG: 2 MMHG
BH CV ECHO MEAS - MV P1/2T MAX VEL: 80.7 CM/SEC
BH CV ECHO MEAS - MV P1/2T: 65.8 MSEC
BH CV ECHO MEAS - MV V2 MAX: 97 CM/SEC
BH CV ECHO MEAS - MV V2 MEAN: 67 CM/SEC
BH CV ECHO MEAS - MV V2 VTI: 25.7 CM
BH CV ECHO MEAS - MVA P1/2T LCG: 2.7 CM^2
BH CV ECHO MEAS - MVA(P1/2T): 3.3 CM^2
BH CV ECHO MEAS - MVA(VTI): 1.9 CM^2
BH CV ECHO MEAS - PA ACC TIME: 0.07 SEC
BH CV ECHO MEAS - PA MAX PG (FULL): 3 MMHG
BH CV ECHO MEAS - PA MAX PG: 4.6 MMHG
BH CV ECHO MEAS - PA PR(ACCEL): 45.7 MMHG
BH CV ECHO MEAS - PA V2 MAX: 107.2 CM/SEC
BH CV ECHO MEAS - PULM A REVS DUR: 0.09 SEC
BH CV ECHO MEAS - PULM A REVS VEL: 33.6 CM/SEC
BH CV ECHO MEAS - PULM DIAS VEL: 32.1 CM/SEC
BH CV ECHO MEAS - PULM S/D: 1.7
BH CV ECHO MEAS - PULM SYS VEL: 54.9 CM/SEC
BH CV ECHO MEAS - PVA(V,A): 2.9 CM^2
BH CV ECHO MEAS - PVA(V,D): 2.9 CM^2
BH CV ECHO MEAS - QP/QS: 1.5
BH CV ECHO MEAS - RAP SYSTOLE: 3 MMHG
BH CV ECHO MEAS - RV MAX PG: 1.6 MMHG
BH CV ECHO MEAS - RV MEAN PG: 1 MMHG
BH CV ECHO MEAS - RV V1 MAX: 64 CM/SEC
BH CV ECHO MEAS - RV V1 MEAN: 47.8 CM/SEC
BH CV ECHO MEAS - RV V1 VTI: 14.3 CM
BH CV ECHO MEAS - RVOT AREA: 4.9 CM^2
BH CV ECHO MEAS - RVOT DIAM: 2.5 CM
BH CV ECHO MEAS - RVSP: 28 MMHG
BH CV ECHO MEAS - SI(AO): 150.9 ML/M^2
BH CV ECHO MEAS - SI(CUBED): 81.1 ML/M^2
BH CV ECHO MEAS - SI(LVOT): 21.8 ML/M^2
BH CV ECHO MEAS - SI(MOD-SP2): 38.7 ML/M^2
BH CV ECHO MEAS - SI(MOD-SP4): 25.9 ML/M^2
BH CV ECHO MEAS - SI(TEICH): 56.6 ML/M^2
BH CV ECHO MEAS - SUP REN AO DIAM: 2.4 CM
BH CV ECHO MEAS - SV(AO): 331.4 ML
BH CV ECHO MEAS - SV(CUBED): 178.2 ML
BH CV ECHO MEAS - SV(LVOT): 48 ML
BH CV ECHO MEAS - SV(MOD-SP2): 85 ML
BH CV ECHO MEAS - SV(MOD-SP4): 57 ML
BH CV ECHO MEAS - SV(RVOT): 69.9 ML
BH CV ECHO MEAS - SV(TEICH): 124.4 ML
BH CV ECHO MEAS - TAPSE (>1.6): 2.7 CM2
BH CV ECHO MEAS - TR MAX VEL: 248.2 CM/SEC
BH CV ECHO MEASUREMENTS AVERAGE E/E' RATIO: 13.27
BH CV XLRA - RV BASE: 3.2 CM
BH CV XLRA - TDI S': 14 CM/SEC
LEFT ATRIUM VOLUME INDEX: 18 ML/M2
LV EF 2D ECHO EST: 54 %
MAXIMAL PREDICTED HEART RATE: 147 BPM
SINUS: 2.7 CM
STJ: 2.8 CM
STRESS TARGET HR: 125 BPM

## 2019-02-05 RX ORDER — LOSARTAN POTASSIUM 50 MG/1
TABLET ORAL
Qty: 90 TABLET | Refills: 0 | Status: SHIPPED | OUTPATIENT
Start: 2019-02-05 | End: 2019-05-07 | Stop reason: SDUPTHER

## 2019-03-01 ENCOUNTER — OFFICE VISIT (OUTPATIENT)
Dept: INTERNAL MEDICINE | Facility: CLINIC | Age: 74
End: 2019-03-01

## 2019-03-01 VITALS
HEART RATE: 78 BPM | OXYGEN SATURATION: 98 % | DIASTOLIC BLOOD PRESSURE: 74 MMHG | WEIGHT: 247 LBS | BODY MASS INDEX: 38.69 KG/M2 | SYSTOLIC BLOOD PRESSURE: 118 MMHG

## 2019-03-01 DIAGNOSIS — J20.9 ACUTE BRONCHITIS, UNSPECIFIED ORGANISM: Primary | ICD-10-CM

## 2019-03-01 DIAGNOSIS — J45.909 ASTHMA, UNSPECIFIED ASTHMA SEVERITY, UNSPECIFIED WHETHER COMPLICATED, UNSPECIFIED WHETHER PERSISTENT: ICD-10-CM

## 2019-03-01 DIAGNOSIS — G20 PARKINSON DISEASE (HCC): ICD-10-CM

## 2019-03-01 PROBLEM — J42 CHRONIC BRONCHITIS (HCC): Status: ACTIVE | Noted: 2019-03-01

## 2019-03-01 PROBLEM — J42 CHRONIC BRONCHITIS (HCC): Status: RESOLVED | Noted: 2019-03-01 | Resolved: 2019-03-01

## 2019-03-01 PROBLEM — I44.2 ATRIOVENTRICULAR BLOCK, COMPLETE (HCC): Status: ACTIVE | Noted: 2019-03-01

## 2019-03-01 PROCEDURE — 99214 OFFICE O/P EST MOD 30 MIN: CPT | Performed by: INTERNAL MEDICINE

## 2019-03-01 RX ORDER — LEVOFLOXACIN 500 MG/1
500 TABLET, FILM COATED ORAL DAILY
Qty: 10 TABLET | Refills: 0 | Status: SHIPPED | OUTPATIENT
Start: 2019-03-01 | End: 2019-04-10

## 2019-03-01 NOTE — PROGRESS NOTES
Subjective     Javier San is a 73 y.o. male who presents with   Chief Complaint   Patient presents with   • URI   • Cough       History of Present Illness     C/o bronchitis for one month.  Cough and congestion with production of sputum.  All in chest.  PND is associated.  No fever.  Mucinex DM some help.  SOA and wheezing is associated.  Moderate severity of persistent symptoms.  He has never got completely over since illness one month ago when he was given doxy and steroid.  Because of Parkinson's disease he is only on perfomomist.  His pulmonologist told him Breo for exacerbations.      Review of Systems   Constitutional: Negative for fever.   HENT: Negative for ear pain, sinus pain and sore throat.    Respiratory: Positive for cough, chest tightness, shortness of breath and wheezing.    Cardiovascular: Negative for chest pain.       The following portions of the patient's history were reviewed and updated as appropriate: allergies, current medications and problem list.    Patient Active Problem List    Diagnosis Date Noted   • Atrioventricular block, complete (CMS/[facility]) 03/01/2019   • History of pacemaker 04/16/2018   • Parkinson disease (CMS/[facility]) 10/30/2017   • Benign prostatic hyperplasia with lower urinary tract symptoms 12/07/2016   • Hyperlipemia 10/27/2016   • Thyroid nodule 10/26/2016     Note Last Updated: 10/31/2017     Noted on CT chest 3/2016.  Stable 9/2016.  Followed by ENT.       • AV block, Mobitz II 06/17/2016   • History of melanoma 04/15/2016     Note Last Updated: 4/15/2016     Times two.       • Atopic rhinitis 03/01/2016   • Arthritis 03/01/2016   • Asthma 03/01/2016   • Asthma 03/01/2016   • Degeneration of intervertebral disc of lumbar region 03/01/2016   • Essential hypertension 03/01/2016   • Hypothyroidism 03/01/2016   • Impaired fasting glucose 03/01/2016   • Lumbar radiculopathy 03/01/2016   • Spinal stenosis of lumbar region 03/01/2016   • Lung nodule 03/01/2016     Note Last  Updated: 10/31/2017     Follow over two years by Dr. Garnica.      • Obstructive sleep apnea syndrome 03/01/2016     Note Last Updated: 3/1/2016     Description: Patient is maintained on BIPAP.         Current Outpatient Medications on File Prior to Visit   Medication Sig Dispense Refill   • acetaminophen (TYLENOL) 325 MG tablet Take 650 mg by mouth Every 6 (Six) Hours As Needed for mild pain (1-3).     • albuterol (ACCUNEB) 1.25 MG/3ML nebulizer solution Take 1 ampule by nebulization Every 6 (Six) Hours As Needed for wheezing.     • albuterol (PROVENTIL HFA;VENTOLIN HFA) 108 (90 BASE) MCG/ACT inhaler Inhale 2 puffs Every 6 (Six) Hours As Needed. ProAir  (90 Base) MCG/ACT Inhalation Aerosol Solution; Patient Sig: ProAir  (90 Base) MCG/ACT Inhalation Aerosol Solution INHALE 1 TO 2 PUFFS EVERY 4 TO 6 HOURS AS NEEDED.; 0; 29-Sep-2014; Active     • atorvastatin (LIPITOR) 10 MG tablet TAKE 1 TABLET BY MOUTH EVERY DAY 90 tablet 0   • azelastine (ASTELIN) 0.1 % nasal spray 1 spray into each nostril 2 (Two) Times a Day. Use 1 to 2 sprays     • carbidopa-levodopa (SINEMET)  MG per tablet TK 1 T PO four time daily  2   • carbidopa-levodopa CR (SINEMET CR)  MG per CR tablet Take 2 tablets by mouth.     • Cholecalciferol (VITAMIN D) 2000 units tablet Take 2,000 Units by mouth Daily.     • Cyanocobalamin (B-12 PO) Take  by mouth.     • DULoxetine (CYMBALTA) 30 MG capsule Take 30 mg by mouth.     • fluticasone (FLONASE) 50 MCG/ACT nasal spray 2 sprays into each nostril daily.     • Fluticasone Furoate-Vilanterol 100-25 MCG/INH aerosol powder  Breo Ellipta 100 mcg-25 mcg/dose powder for inhalation     • HYDROcodone-acetaminophen (NORCO) 7.5-325 MG per tablet TK 1 T PO Q DAY PRN  0   • levothyroxine (SYNTHROID, LEVOTHROID) 137 MCG tablet TAKE 1 TABLET BY MOUTH EVERY DAY 90 tablet 0   • losartan (COZAAR) 50 MG tablet TAKE 1 TABLET BY MOUTH EVERY DAY 90 tablet 0   • LYRICA 75 MG capsule TK 1 C PO BID  1   •  montelukast (SINGULAIR) 10 MG tablet Take 1 tablet by mouth daily.     • MULTIPLE VITAMINS PO Take 1 tablet/day by mouth Daily.     • PERFOROMIST 20 MCG/2ML nebulizer solution INHALE 1 VIAL VIA NEB BID  11   • pregabalin (LYRICA) 150 MG capsule Take 150 mg by mouth.     • sildenafil (VIAGRA) 25 MG tablet Take 25 mg by mouth daily as needed for erectile dysfunction.     • vitamin C (ASCORBIC ACID) 250 MG tablet Take 250 mg by mouth daily.     • [DISCONTINUED] doxycycline (MONODOX) 100 MG capsule Take 1 capsule by mouth Every 12 (Twelve) Hours. 20 capsule 0   • [DISCONTINUED] MethylPREDNISolone (MEDROL, KENNEY,) 4 MG tablet Take as directed on package instructions. 21 tablet 0     No current facility-administered medications on file prior to visit.        Objective     /74   Pulse 78   Wt 112 kg (247 lb)   SpO2 98%   BMI 38.69 kg/m²     Physical Exam   Constitutional: He is oriented to person, place, and time. He appears well-developed and well-nourished.   HENT:   Head: Atraumatic.   Right Ear: Hearing and tympanic membrane normal.   Left Ear: Hearing and tympanic membrane normal.   Mouth/Throat: No oropharyngeal exudate or posterior oropharyngeal erythema.   Cardiovascular: Normal rate, regular rhythm and normal heart sounds.   Pulmonary/Chest: Effort normal and breath sounds normal.   Neurological: He is alert and oriented to person, place, and time.   Skin: Skin is warm and dry.   Psychiatric: He has a normal mood and affect.       Assessment/Plan   Javier was seen today for uri and cough.    Diagnoses and all orders for this visit:    Acute bronchitis, unspecified organism    Asthma, unspecified asthma severity, unspecified whether complicated, unspecified whether persistent    Other orders  -     levoFLOXacin (LEVAQUIN) 500 MG tablet; Take 1 tablet by mouth Daily.        Discussion    Patient presents with episode of acute bronchitis.  A prescription for antibiotics is provided today.  Levaquin chosen  because of doxycycline failure.   The patient is instructed to take along with Mucinex DM.  Breo samples is provided to take while ill.   Let me know they are not feeling better over the next 3 days or if there is any change in symptoms.               Future Appointments   Date Time Provider Department Center   5/2/2019  9:00 AM [facility] None   5/7/2019  1:00 PM Lainey Gaffney MD [facility] None   5/8/2019  2:00 PM [facility] IN OFFICE, [facility] None   5/8/2019  2:30 PM Sindy Hernández APRN [facility] None

## 2019-04-08 ENCOUNTER — HOSPITAL ENCOUNTER (EMERGENCY)
Facility: HOSPITAL | Age: 74
Discharge: HOME | End: 2019-04-08
Attending: EMERGENCY MEDICINE | Admitting: EMERGENCY MEDICINE

## 2019-04-08 ENCOUNTER — APPOINTMENT (OUTPATIENT)
Dept: CARDIOLOGY | Facility: HOSPITAL | Age: 74
End: 2019-04-08

## 2019-04-08 VITALS
HEART RATE: 72 BPM | SYSTOLIC BLOOD PRESSURE: 172 MMHG | DIASTOLIC BLOOD PRESSURE: 103 MMHG | HEIGHT: 68 IN | WEIGHT: 238 LBS | TEMPERATURE: 98 F | OXYGEN SATURATION: 95 % | RESPIRATION RATE: 16 BRPM | BODY MASS INDEX: 36.07 KG/M2

## 2019-04-08 DIAGNOSIS — M79.89 RIGHT LEG SWELLING: Primary | ICD-10-CM

## 2019-04-08 LAB
ALBUMIN SERPL-MCNC: 4.1 G/DL (ref 3.5–5.2)
ALBUMIN/GLOB SERPL: 1.6 G/DL
ALP SERPL-CCNC: 77 U/L (ref 39–117)
ALT SERPL W P-5'-P-CCNC: 15 U/L (ref 1–41)
ANION GAP SERPL CALCULATED.3IONS-SCNC: 13.8 MMOL/L
AST SERPL-CCNC: 18 U/L (ref 1–40)
BASOPHILS # BLD AUTO: 0.06 10*3/MM3 (ref 0–0.2)
BASOPHILS NFR BLD AUTO: 0.9 % (ref 0–1.5)
BILIRUB SERPL-MCNC: 0.3 MG/DL (ref 0.2–1.2)
BUN BLD-MCNC: 14 MG/DL (ref 8–23)
BUN/CREAT SERPL: 15.6 (ref 7–25)
CALCIUM SPEC-SCNC: 8.8 MG/DL (ref 8.6–10.5)
CHLORIDE SERPL-SCNC: 105 MMOL/L (ref 98–107)
CO2 SERPL-SCNC: 23.2 MMOL/L (ref 22–29)
CREAT BLD-MCNC: 0.9 MG/DL (ref 0.76–1.27)
DEPRECATED RDW RBC AUTO: 45.9 FL (ref 37–54)
EOSINOPHIL # BLD AUTO: 0.19 10*3/MM3 (ref 0–0.4)
EOSINOPHIL NFR BLD AUTO: 3 % (ref 0.3–6.2)
ERYTHROCYTE [DISTWIDTH] IN BLOOD BY AUTOMATED COUNT: 13.2 % (ref 12.3–15.4)
GFR SERPL CREATININE-BSD FRML MDRD: 83 ML/MIN/1.73
GLOBULIN UR ELPH-MCNC: 2.5 GM/DL
GLUCOSE BLD-MCNC: 117 MG/DL (ref 65–99)
HCT VFR BLD AUTO: 46.7 % (ref 37.5–51)
HGB BLD-MCNC: 15.4 G/DL (ref 13–17.7)
IMM GRANULOCYTES # BLD AUTO: 0.01 10*3/MM3 (ref 0–0.05)
IMM GRANULOCYTES NFR BLD AUTO: 0.2 % (ref 0–0.5)
LYMPHOCYTES # BLD AUTO: 1.65 10*3/MM3 (ref 0.7–3.1)
LYMPHOCYTES NFR BLD AUTO: 26.1 % (ref 19.6–45.3)
MCH RBC QN AUTO: 31 PG (ref 26.6–33)
MCHC RBC AUTO-ENTMCNC: 33 G/DL (ref 31.5–35.7)
MCV RBC AUTO: 94 FL (ref 79–97)
MONOCYTES # BLD AUTO: 0.78 10*3/MM3 (ref 0.1–0.9)
MONOCYTES NFR BLD AUTO: 12.3 % (ref 5–12)
NEUTROPHILS # BLD AUTO: 3.63 10*3/MM3 (ref 1.4–7)
NEUTROPHILS NFR BLD AUTO: 57.5 % (ref 42.7–76)
NRBC BLD AUTO-RTO: 0 /100 WBC (ref 0–0)
NT-PROBNP SERPL-MCNC: 448.9 PG/ML (ref 5–900)
PLATELET # BLD AUTO: 196 10*3/MM3 (ref 140–450)
PMV BLD AUTO: 10.5 FL (ref 6–12)
POTASSIUM BLD-SCNC: 4.3 MMOL/L (ref 3.5–5.2)
PROT SERPL-MCNC: 6.6 G/DL (ref 6–8.5)
RBC # BLD AUTO: 4.97 10*6/MM3 (ref 4.14–5.8)
SODIUM BLD-SCNC: 142 MMOL/L (ref 136–145)
WBC NRBC COR # BLD: 6.32 10*3/MM3 (ref 3.4–10.8)

## 2019-04-08 PROCEDURE — 80053 COMPREHEN METABOLIC PANEL: CPT | Performed by: EMERGENCY MEDICINE

## 2019-04-08 PROCEDURE — 93971 EXTREMITY STUDY: CPT

## 2019-04-08 PROCEDURE — 83880 ASSAY OF NATRIURETIC PEPTIDE: CPT | Performed by: EMERGENCY MEDICINE

## 2019-04-08 PROCEDURE — 99283 EMERGENCY DEPT VISIT LOW MDM: CPT

## 2019-04-08 PROCEDURE — 85025 COMPLETE CBC W/AUTO DIFF WBC: CPT | Performed by: EMERGENCY MEDICINE

## 2019-04-08 NOTE — ED PROVIDER NOTES
" EMERGENCY DEPARTMENT ENCOUNTER    Room Number:  04/04  PCP: Lainey Gaffney MD  Historian: Patient, Family      HPI  Chief Complaint: Leg Swelling  Context: Javier San is a 73 y.o. male who presents to the ED c/o RLE edema onset several months ago. Pt denies documented fever, chest pain, dyspnea, abdominal pain, focal weakness of the BLEs, new/worsening BLE numbness, and skin rash. However, pt has also had right chest wall swelling and weight gain. There are no other complaints at this time.         Location: Right leg  Radiation: None  Character: \"swelling\"  Duration: Onset several months ago  Severity: Moderate  Progression: Unchanged  Aggravating Factors: Nothing  Alleviating Factors: Nothing        MEDICAL RECORD REVIEW    Pt had BUE arterial dopplers performed in 01/2019 that showed:   · Normal arterial pressures on the right. Normal digital pressures noted on the right.  · Normal arterial pressures on the left. Normal digital pressures noted on the left.      Pt had vascular screening performed in 10/2017:   Carotid Artery Disease and Stroke Screening:   The right vessel is normal.  The left vessel is normal.   Carotid screening recommendations:  screening every two years.     Abdominal Aortic Aneurysm (AAA) Screening:   Maximum proximal diameter of 2.12 cm.  The artery was normal.     Peripheral Artery Disease (P.A.D.) Screening:  The right has normal arterial pressures.  The left has normal arterial pressures.          PAST MEDICAL HISTORY  Active Ambulatory Problems     Diagnosis Date Noted   • Atopic rhinitis 03/01/2016   • Arthritis 03/01/2016   • Asthma 03/01/2016   • Asthma 03/01/2016   • Degeneration of intervertebral disc of lumbar region 03/01/2016   • Essential hypertension 03/01/2016   • Hypothyroidism 03/01/2016   • Impaired fasting glucose 03/01/2016   • Lumbar radiculopathy 03/01/2016   • Spinal stenosis of lumbar region 03/01/2016   • Lung nodule 03/01/2016   • Obstructive sleep apnea " syndrome 03/01/2016   • History of melanoma 04/15/2016   • AV block, Mobitz II 06/17/2016   • Thyroid nodule 10/26/2016   • Hyperlipemia 10/27/2016   • Benign prostatic hyperplasia with lower urinary tract symptoms 12/07/2016   • Parkinson disease (CMS/HCC) 10/30/2017   • History of pacemaker 04/16/2018   • Atrioventricular block, complete (CMS/HCC) 03/01/2019     Resolved Ambulatory Problems     Diagnosis Date Noted   • Anxiety 03/01/2016   • Low back pain 03/01/2016   • Malignant neoplasm of cheek (CMS/HCC) 03/01/2016   • Headache due to trauma 06/06/2016   • Atypical chest pain 06/17/2016   • Acute sinus infection 03/07/2017   • Morbidly obese (CMS/HCC) 11/05/2018   • Chronic bronchitis (CMS/HCC) 03/01/2019     Past Medical History:   Diagnosis Date   • Abnormal blood chemistry    • Abnormal liver enzymes    • Acute bacterial prostatitis    • Anemia    • Anxiety 03/24/2014   • Arthritis    • Asthma 3/1/2016   • Basal cell carcinoma    • Chronic bronchitis (CMS/HCC) 3/1/2016   • Gynecomastia    • H/O degenerative disc disease    • H/O hernia repair    • Hypertension 3/1/2016   • Hypokalemia    • Hypothyroidism    • Labile hypertension 03/01/2016   • Left bundle branch block    • Low back pain    • Lumbar radiculopathy    • Lumbar stenosis    • Lung nodule    • Malignant neoplasm of cheek (CMS/HCC) 3/1/2016   • Mobitz (type) II atrioventricular block    • Obstructive sleep apnea    • Parkinson disease (CMS/HCC)    • Rupture of kidney    • Skin cancer of nose    • Stricture of ureter    • Testicular pain    • Venous insufficiency    • Vitamin D deficiency          PAST SURGICAL HISTORY  Past Surgical History:   Procedure Laterality Date   • BACK SURGERY      L4-L5 with Dr. Solomon   • CARDIAC ELECTROPHYSIOLOGY PROCEDURE N/A 6/17/2016    Procedure: Pacemaker DC new  [place];  Surgeon: Zachary Narayanan MD;  Location: [facility] INVASIVE LOCATION;  Service:    • COLON SURGERY      Complete colonoscopy- Dr. knox/DAVID    • CYSTOSCOPY TRANSURETHRAL RESECTION OF PROSTATE     • CYSTOSCOPY TRANSURETHRAL RESECTION OF PROSTATE N/A 2016    Procedure: CYSTOSCOPY TRANSURETHRAL RESECTION OF PROSTATE;  Surgeon: Law Dee MD;  Location: [facility];  Service:    • HERNIA REPAIR     • NASAL SEPTUM SURGERY     • NOSE SURGERY      deviated septum   • PROSTATE SURGERY      transurethral destruction prostate tissue   • REPLACEMENT TOTAL KNEE BILATERAL      right in  and left in    • TONSILLECTOMY     • TOTAL SHOULDER REPLACEMENT Bilateral     left in  and right in          FAMILY HISTORY  Family History   Problem Relation Age of Onset   • Alcohol abuse Mother    • Cancer Mother         skin   • Stroke Mother    • Alcohol abuse Father    • Emphysema Father    • COPD Father    • Cancer Father         skin         SOCIAL HISTORY  Social History     Socioeconomic History   • Marital status:      Spouse name: Not on file   • Number of children: Not on file   • Years of education: Not on file   • Highest education level: Not on file   Tobacco Use   • Smoking status: Former Smoker     Packs/day: 1.00     Types: Cigarettes     Start date: 1964     Last attempt to quit: 1984     Years since quittin.8   • Smokeless tobacco: Never Used   • Tobacco comment: 20 year smoker/ CAFFEINE USE: 1-2 SOFT DRINKS DAILY.    Substance and Sexual Activity   • Alcohol use: Yes     Comment: social, 4 beers a year   • Drug use: No   • Sexual activity: Defer         ALLERGIES  Levofloxacin        REVIEW OF SYSTEMS  Review of Systems   Constitutional: Positive for unexpected weight change (weight gain). Negative for chills and fever.   HENT: Negative for congestion, rhinorrhea and sore throat.    Eyes: Negative for pain.   Respiratory: Negative for cough and shortness of breath.    Cardiovascular: Positive for leg swelling (RLE edema). Negative for chest pain and palpitations.        Right chest wall swelling    Gastrointestinal: Negative for abdominal pain, diarrhea, nausea and vomiting.   Genitourinary: Negative for difficulty urinating, dysuria, flank pain and frequency.   Musculoskeletal: Negative for myalgias, neck pain and neck stiffness.   Skin: Negative for rash.   Neurological: Negative for dizziness, speech difficulty, weakness, light-headedness, numbness and headaches.   Psychiatric/Behavioral: Negative.    All other systems reviewed and are negative.           PHYSICAL EXAM  ED Triage Vitals   Temp Heart Rate Resp BP SpO2   04/08/19 1806 04/08/19 1806 04/08/19 1806 04/08/19 1816 04/08/19 1806   98 °F (36.7 °C) 75 16 162/88 95 %      Temp src Heart Rate Source Patient Position BP Location FiO2 (%)   04/08/19 1806 04/08/19 1806 04/08/19 1816 04/08/19 1816 --   Tympanic Monitor Lying Left arm          Physical Exam   Constitutional: He is oriented to person, place, and time. No distress.   HENT:   Head: Normocephalic.   Mouth/Throat: Mucous membranes are normal.   Eyes: EOM are normal. Pupils are equal, round, and reactive to light.   Neck: Normal range of motion. Neck supple.   Cardiovascular: Normal rate, regular rhythm and normal heart sounds.   Brisk cap refill of the BLEs.   Pulmonary/Chest: Effort normal and breath sounds normal. No respiratory distress. He has no decreased breath sounds. He has no wheezes. He has no rhonchi. He has no rales.   No obvious chest wall swelling.    Abdominal: Soft. There is no tenderness. There is no rebound and no guarding.   Musculoskeletal: Normal range of motion. He exhibits no edema (no BLE edema).   Sensation is intact to light touch throughout the bilateral lower extremities. Muscle strength is 5/5 and symmetrical with plantar flexion and dorsiflexion. Patellar reflexes are 2+ and equal bilaterally. DP and PT pulses are 2+ bilaterally.      Neurological: He is alert and oriented to person, place, and time. He has normal sensation.   Skin: Skin is warm and dry.    Psychiatric: Mood and affect normal.   Nursing note and vitals reviewed.          LAB RESULTS  Recent Results (from the past 24 hour(s))   Comprehensive Metabolic Panel    Collection Time: 04/08/19  6:29 PM   Result Value Ref Range    Glucose 117 (H) 65 - 99 mg/dL    BUN 14 8 - 23 mg/dL    Creatinine 0.90 0.76 - 1.27 mg/dL    Sodium 142 136 - 145 mmol/L    Potassium 4.3 3.5 - 5.2 mmol/L    Chloride 105 98 - 107 mmol/L    CO2 23.2 22.0 - 29.0 mmol/L    Calcium 8.8 8.6 - 10.5 mg/dL    Total Protein 6.6 6.0 - 8.5 g/dL    Albumin 4.10 3.50 - 5.20 g/dL    ALT (SGPT) 15 1 - 41 U/L    AST (SGOT) 18 1 - 40 U/L    Alkaline Phosphatase 77 39 - 117 U/L    Total Bilirubin 0.3 0.2 - 1.2 mg/dL    eGFR Non African Amer 83 >60 mL/min/1.73    Globulin 2.5 gm/dL    A/G Ratio 1.6 g/dL    BUN/Creatinine Ratio 15.6 7.0 - 25.0    Anion Gap 13.8 mmol/L   BNP    Collection Time: 04/08/19  6:29 PM   Result Value Ref Range    proBNP 448.9 5.0 - 900.0 pg/mL   CBC Auto Differential    Collection Time: 04/08/19  6:29 PM   Result Value Ref Range    WBC 6.32 3.40 - 10.80 10*3/mm3    RBC 4.97 4.14 - 5.80 10*6/mm3    Hemoglobin 15.4 13.0 - 17.7 g/dL    Hematocrit 46.7 37.5 - 51.0 %    MCV 94.0 79.0 - 97.0 fL    MCH 31.0 26.6 - 33.0 pg    MCHC 33.0 31.5 - 35.7 g/dL    RDW 13.2 12.3 - 15.4 %    RDW-SD 45.9 37.0 - 54.0 fl    MPV 10.5 6.0 - 12.0 fL    Platelets 196 140 - 450 10*3/mm3    Neutrophil % 57.5 42.7 - 76.0 %    Lymphocyte % 26.1 19.6 - 45.3 %    Monocyte % 12.3 (H) 5.0 - 12.0 %    Eosinophil % 3.0 0.3 - 6.2 %    Basophil % 0.9 0.0 - 1.5 %    Immature Grans % 0.2 0.0 - 0.5 %    Neutrophils, Absolute 3.63 1.40 - 7.00 10*3/mm3    Lymphocytes, Absolute 1.65 0.70 - 3.10 10*3/mm3    Monocytes, Absolute 0.78 0.10 - 0.90 10*3/mm3    Eosinophils, Absolute 0.19 0.00 - 0.40 10*3/mm3    Basophils, Absolute 0.06 0.00 - 0.20 10*3/mm3    Immature Grans, Absolute 0.01 0.00 - 0.05 10*3/mm3    nRBC 0.0 0.0 - 0.0 /100 WBC   Duplex Venous Lower Extremity -  Right    Collection Time: 04/08/19  7:58 PM   Result Value Ref Range    Right Common Femoral Spont Y     Right Common Femoral Phasic Y     Right Common Femoral Augment Y     Right Common Femoral Competent Y     Right Common Femoral Compress C     Right Saphenofemoral Junction Spont Y     Right Saphenofemoral Junction Phasic Y     Right Saphenofemoral Junction Augment Y     Right Saphenofemoral Junction Competent Y     Right Saphenofemoral Junction Compress C     Right Proximal Femoral Compress C     Right Mid Femoral Spont Y     Right Mid Femoral Phasic Y     Right Mid Femoral Augment Y     Right Mid Femoral Competent Y     Right Mid Femoral Compress C     Right Distal Femoral Compress C     Right Popliteal Spont Y     Right Popliteal Phasic Y     Right Popliteal Augment Y     Right Popliteal Competent Y     Right Popliteal Compress C     Right Posterior Tibial Compress C     Right Peroneal Compress C     Right GastronemiusSoleal Compress C     Right Greater Saph AK Compress C     Right Greater Saph BK Compress C     Right Lesser Saph Compress C     Left Common Femoral Spont Y     Left Common Femoral Phasic Y     Left Common Femoral Augment Y     Left Common Femoral Competent Y     Left Common Femoral Compress C        Ordered the above labs and reviewed the results.        RADIOLOGY    RLE Venous Doppler: Negative for acute DVT.     Ordered the above noted radiological studies. Reviewed by me in PACS.            PROCEDURES  Procedures        MEDICATIONS GIVEN IN ER  Medications - No data to display          PROGRESS AND CONSULTS  ED Course as of Apr 08 2305 Mon Apr 08, 2019 2025 8:25 PM  Patient with right leg swelling.  Has severe peripheral neuopathic pain.  No evidence of DVT.  No infection.  No trauma.  Will discharge home.  Follow up with PMD.  [SL]      ED Course User Index  [SL] Ernie Miller MD       6:26 PM:  Blood work and RLE venous doppler ordered for further evaluation.     8:24 PM:  Rechecked  pt. Pt is resting comfortably and appears in no acute distress. Informed pt that his RLE venous doppler is negative for acute DVT. Pt's BNP, BUN, and Creatinine are WNL. Pt was advised to f/u with his PMD in the office. RTER warnings given. Pt agrees with plan for discharge.           MEDICAL DECISION MAKING      MDM  Number of Diagnoses or Management Options     Amount and/or Complexity of Data Reviewed  Clinical lab tests: ordered and reviewed (BNP is 448.9. )  Tests in the radiology section of CPT®: ordered and reviewed (RLE venous doppler: Negative for acute DVT. )  Decide to obtain previous medical records or to obtain history from someone other than the patient: yes    Patient Progress  Patient progress: stable             DIAGNOSIS  Final diagnoses:   Right leg swelling           DISPOSITION  Pt discharged.    DISCHARGE    Patient discharged in stable condition.    Reviewed implications of results, diagnosis, meds, responsibility to follow up, warning signs and symptoms of possible worsening, potential complications and reasons to return to ER.    Patient/Family voiced understanding of above instructions.    Discussed plan for discharge, as there is no emergent indication for admission. Pt/family is agreeable and understands need for follow up and repeat testing. Pt is aware that discharge does not mean that nothing is wrong but it indicates no emergency is present that requires admission and they must continue care with follow-up as given below or physician of their choice.     FOLLOW-UP  Lainey Gaffney MD  [address] [address] [phone]    Schedule an appointment as soon as possible for a visit           Latest Documented Vital Signs:  As of 9:30 PM  BP- (!) 172/103 HR- 72 Temp- 98 °F (36.7 °C) (Tympanic) O2 sat- 95%        --  Documentation assistance provided by aime Moses for Dr. Paul MD.  Information recorded by the aime was done at my direction and has  been verified and validated by me.         Isatu Moses  04/08/19 1418       Ernie Miller MD  04/08/19 [id number]

## 2019-04-09 LAB
BH CV LOWER VASCULAR LEFT COMMON FEMORAL AUGMENT: NORMAL
BH CV LOWER VASCULAR LEFT COMMON FEMORAL COMPETENT: NORMAL
BH CV LOWER VASCULAR LEFT COMMON FEMORAL COMPRESS: NORMAL
BH CV LOWER VASCULAR LEFT COMMON FEMORAL PHASIC: NORMAL
BH CV LOWER VASCULAR LEFT COMMON FEMORAL SPONT: NORMAL
BH CV LOWER VASCULAR RIGHT COMMON FEMORAL AUGMENT: NORMAL
BH CV LOWER VASCULAR RIGHT COMMON FEMORAL COMPETENT: NORMAL
BH CV LOWER VASCULAR RIGHT COMMON FEMORAL COMPRESS: NORMAL
BH CV LOWER VASCULAR RIGHT COMMON FEMORAL PHASIC: NORMAL
BH CV LOWER VASCULAR RIGHT COMMON FEMORAL SPONT: NORMAL
BH CV LOWER VASCULAR RIGHT DISTAL FEMORAL COMPRESS: NORMAL
BH CV LOWER VASCULAR RIGHT GASTRONEMIUS COMPRESS: NORMAL
BH CV LOWER VASCULAR RIGHT GREATER SAPH AK COMPRESS: NORMAL
BH CV LOWER VASCULAR RIGHT GREATER SAPH BK COMPRESS: NORMAL
BH CV LOWER VASCULAR RIGHT LESSER SAPH COMPRESS: NORMAL
BH CV LOWER VASCULAR RIGHT MID FEMORAL AUGMENT: NORMAL
BH CV LOWER VASCULAR RIGHT MID FEMORAL COMPETENT: NORMAL
BH CV LOWER VASCULAR RIGHT MID FEMORAL COMPRESS: NORMAL
BH CV LOWER VASCULAR RIGHT MID FEMORAL PHASIC: NORMAL
BH CV LOWER VASCULAR RIGHT MID FEMORAL SPONT: NORMAL
BH CV LOWER VASCULAR RIGHT PERONEAL COMPRESS: NORMAL
BH CV LOWER VASCULAR RIGHT POPLITEAL AUGMENT: NORMAL
BH CV LOWER VASCULAR RIGHT POPLITEAL COMPETENT: NORMAL
BH CV LOWER VASCULAR RIGHT POPLITEAL COMPRESS: NORMAL
BH CV LOWER VASCULAR RIGHT POPLITEAL PHASIC: NORMAL
BH CV LOWER VASCULAR RIGHT POPLITEAL SPONT: NORMAL
BH CV LOWER VASCULAR RIGHT POSTERIOR TIBIAL COMPRESS: NORMAL
BH CV LOWER VASCULAR RIGHT PROXIMAL FEMORAL COMPRESS: NORMAL
BH CV LOWER VASCULAR RIGHT SAPHENOFEMORAL JUNCTION AUGMENT: NORMAL
BH CV LOWER VASCULAR RIGHT SAPHENOFEMORAL JUNCTION COMPETENT: NORMAL
BH CV LOWER VASCULAR RIGHT SAPHENOFEMORAL JUNCTION COMPRESS: NORMAL
BH CV LOWER VASCULAR RIGHT SAPHENOFEMORAL JUNCTION PHASIC: NORMAL
BH CV LOWER VASCULAR RIGHT SAPHENOFEMORAL JUNCTION SPONT: NORMAL

## 2019-04-10 ENCOUNTER — OFFICE VISIT (OUTPATIENT)
Dept: INTERNAL MEDICINE | Facility: CLINIC | Age: 74
End: 2019-04-10

## 2019-04-10 VITALS
HEIGHT: 68 IN | BODY MASS INDEX: 36.98 KG/M2 | SYSTOLIC BLOOD PRESSURE: 159 MMHG | OXYGEN SATURATION: 96 % | DIASTOLIC BLOOD PRESSURE: 92 MMHG | TEMPERATURE: 98.6 F | HEART RATE: 77 BPM | WEIGHT: 244 LBS

## 2019-04-10 DIAGNOSIS — M79.2 NEUROPATHIC PAIN: ICD-10-CM

## 2019-04-10 DIAGNOSIS — I10 ESSENTIAL HYPERTENSION: Primary | ICD-10-CM

## 2019-04-10 DIAGNOSIS — I73.00 RAYNAUD'S DISEASE WITHOUT GANGRENE: ICD-10-CM

## 2019-04-10 PROCEDURE — 99214 OFFICE O/P EST MOD 30 MIN: CPT | Performed by: INTERNAL MEDICINE

## 2019-04-10 RX ORDER — AMLODIPINE BESYLATE 5 MG/1
5 TABLET ORAL DAILY
Qty: 30 TABLET | Refills: 11 | Status: SHIPPED | OUTPATIENT
Start: 2019-04-10 | End: 2019-06-05

## 2019-04-10 RX ORDER — GABAPENTIN 300 MG/1
600 CAPSULE ORAL 3 TIMES DAILY
Qty: 180 CAPSULE | Refills: 5 | Status: SHIPPED | OUTPATIENT
Start: 2019-04-10 | End: 2019-06-07 | Stop reason: SDUPTHER

## 2019-04-10 NOTE — PROGRESS NOTES
Subjective     Javier San is a 73 y.o. male who presents with   Chief Complaint   Patient presents with   • Leg Injury     swelling, cold toes; states somtimes they can be blue and white. ER visit on monday        History of Present Illness     Patient states right leg is swelling more than left.  He was seen in the ER.  US was normal.  It is now better.    Pins and needles sensation in the legs.  Going on for six months.  Seen Dr. Ford for Parkinson's disease.  They have tried gabapentin and Lyrica.  He is also on Cymbalta.   He states he has had little relief.  He has appointment with another neurologist in their practice for the neuropathic pain.  EMG/NCS done but I don't have access to results.      Cold toes.  Can turn blue and white.  Off and on issue.  Vascular screen was normal.      BP has been elevated.    Review of Systems   Respiratory: Negative.    Cardiovascular: Negative.        The following portions of the patient's history were reviewed and updated as appropriate: allergies, current medications and problem list.    Patient Active Problem List    Diagnosis Date Noted   • Raynaud's disease without gangrene 04/10/2019   • Atrioventricular block, complete (CMS/HCC) 03/01/2019   • History of pacemaker 04/16/2018   • Parkinson disease (CMS/HCC) 10/30/2017   • Benign prostatic hyperplasia with lower urinary tract symptoms 12/07/2016   • Hyperlipemia 10/27/2016   • Thyroid nodule 10/26/2016     Note Last Updated: 10/31/2017     Noted on CT chest 3/2016.  Stable 9/2016.  Followed by ENT.       • AV block, Mobitz II 06/17/2016   • History of melanoma 04/15/2016     Note Last Updated: 4/15/2016     Times two.       • Atopic rhinitis 03/01/2016   • Arthritis 03/01/2016   • Asthma 03/01/2016   • Asthma 03/01/2016   • Degeneration of intervertebral disc of lumbar region 03/01/2016   • Essential hypertension 03/01/2016   • Hypothyroidism 03/01/2016   • Impaired fasting glucose 03/01/2016   • Lumbar  radiculopathy 03/01/2016   • Spinal stenosis of lumbar region 03/01/2016   • Lung nodule 03/01/2016     Note Last Updated: 10/31/2017     Follow over two years by Dr. Garnica.      • Obstructive sleep apnea syndrome 03/01/2016     Note Last Updated: 3/1/2016     Description: Patient is maintained on BIPAP.         Current Outpatient Medications on File Prior to Visit   Medication Sig Dispense Refill   • acetaminophen (TYLENOL) 325 MG tablet Take 650 mg by mouth Every 6 (Six) Hours As Needed for mild pain (1-3).     • albuterol (ACCUNEB) 1.25 MG/3ML nebulizer solution Take 1 ampule by nebulization Every 6 (Six) Hours As Needed for wheezing.     • albuterol (PROVENTIL HFA;VENTOLIN HFA) 108 (90 BASE) MCG/ACT inhaler Inhale 2 puffs Every 6 (Six) Hours As Needed. ProAir  (90 Base) MCG/ACT Inhalation Aerosol Solution; Patient Sig: ProAir  (90 Base) MCG/ACT Inhalation Aerosol Solution INHALE 1 TO 2 PUFFS EVERY 4 TO 6 HOURS AS NEEDED.; 0; 29-Sep-2014; Active     • atorvastatin (LIPITOR) 10 MG tablet TAKE 1 TABLET BY MOUTH EVERY DAY 90 tablet 0   • azelastine (ASTELIN) 0.1 % nasal spray 1 spray into each nostril 2 (Two) Times a Day. Use 1 to 2 sprays     • carbidopa-levodopa (SINEMET)  MG per tablet TK 1 T PO four time daily  2   • carbidopa-levodopa CR (SINEMET CR)  MG per CR tablet Take 2 tablets by mouth.     • Cholecalciferol (VITAMIN D) 2000 units tablet Take 2,000 Units by mouth Daily.     • Cyanocobalamin (B-12 PO) Take  by mouth.     • DULoxetine (CYMBALTA) 30 MG capsule Take 30 mg by mouth.     • fluticasone (FLONASE) 50 MCG/ACT nasal spray 2 sprays into each nostril daily.     • Fluticasone Furoate-Vilanterol 100-25 MCG/INH aerosol powder  Breo Ellipta 100 mcg-25 mcg/dose powder for inhalation     • HYDROcodone-acetaminophen (NORCO) 7.5-325 MG per tablet TK 1 T PO Q DAY PRN  0   • levothyroxine (SYNTHROID, LEVOTHROID) 137 MCG tablet TAKE 1 TABLET BY MOUTH EVERY DAY 90 tablet 0   • losartan  "(COZAAR) 50 MG tablet TAKE 1 TABLET BY MOUTH EVERY DAY 90 tablet 0   • montelukast (SINGULAIR) 10 MG tablet Take 1 tablet by mouth daily.     • MULTIPLE VITAMINS PO Take 1 tablet/day by mouth Daily.     • PERFOROMIST 20 MCG/2ML nebulizer solution INHALE 1 VIAL VIA NEB BID  11   • pregabalin (LYRICA) 150 MG capsule Take 150 mg by mouth.     • sildenafil (VIAGRA) 25 MG tablet Take 25 mg by mouth daily as needed for erectile dysfunction.     • vitamin C (ASCORBIC ACID) 250 MG tablet Take 250 mg by mouth daily.       No current facility-administered medications on file prior to visit.        Objective     /92 (BP Location: Left arm, Patient Position: Sitting, Cuff Size: Adult)   Pulse 77   Temp 98.6 °F (37 °C) (Oral)   Ht 172.7 cm (68\")   Wt 111 kg (244 lb)   SpO2 96%   BMI 37.10 kg/m²     Physical Exam   Constitutional: He is oriented to person, place, and time. He appears well-developed and well-nourished.   HENT:   Head: Atraumatic.   Cardiovascular: Normal rate, regular rhythm and normal heart sounds.   Pulses:       Dorsalis pedis pulses are 2+ on the right side, and 2+ on the left side.        Posterior tibial pulses are 2+ on the right side, and 2+ on the left side.   1+ bilateral LE edema.   Pulmonary/Chest: Effort normal and breath sounds normal.   Neurological: He is alert and oriented to person, place, and time.   Skin: Skin is warm and dry.   Psychiatric: He has a normal mood and affect.       Assessment/Plan   Javier was seen today for leg injury.    Diagnoses and all orders for this visit:    Essential hypertension    Neuropathic pain    Raynaud's disease without gangrene    Other orders  -     gabapentin (NEURONTIN) 300 MG capsule; Take 2 capsules by mouth 3 (Three) Times a Day.  -     amLODIPine (NORVASC) 5 MG tablet; Take 1 tablet by mouth Daily.        Discussion    Neuropathic pain.  Work up in progress with neurologist.  I will take over gabapentin and increase to see if this gives him " some relief.    HTN and Raymaud's phenomenon.  The above description of feet going cold and white fits Raynauds disease.  He has been ruled out for arterial and venous disease.   Trial of adding Norvasc to help both issues.    F/u in one month.    15/25 minutes was spent in counseling of the following topics:, impressions, treatment options         Future Appointments   Date Time Provider Department Center   5/2/2019  9:00 AM [facility] None   5/7/2019  1:00 PM Lainey Gaffney MD [facility] None   5/8/2019  2:00 PM [facility] None   5/8/2019  2:30 PM Sindy Hernández APRN [facility] None

## 2019-04-15 RX ORDER — ATORVASTATIN CALCIUM 10 MG/1
TABLET, FILM COATED ORAL
Qty: 90 TABLET | Refills: 0 | Status: SHIPPED | OUTPATIENT
Start: 2019-04-15 | End: 2019-07-12 | Stop reason: SDUPTHER

## 2019-04-15 RX ORDER — LEVOTHYROXINE SODIUM 137 UG/1
TABLET ORAL
Qty: 90 TABLET | Refills: 0 | Status: SHIPPED | OUTPATIENT
Start: 2019-04-15 | End: 2019-07-12 | Stop reason: SDUPTHER

## 2019-05-08 RX ORDER — LOSARTAN POTASSIUM 50 MG/1
TABLET ORAL
Qty: 90 TABLET | Refills: 0 | Status: SHIPPED | OUTPATIENT
Start: 2019-05-08 | End: 2019-08-03 | Stop reason: SDUPTHER

## 2019-05-30 DIAGNOSIS — E78.5 HYPERLIPIDEMIA, UNSPECIFIED HYPERLIPIDEMIA TYPE: Primary | ICD-10-CM

## 2019-05-30 DIAGNOSIS — E03.9 HYPOTHYROIDISM, UNSPECIFIED TYPE: ICD-10-CM

## 2019-06-03 LAB
ALBUMIN SERPL-MCNC: 4.5 G/DL (ref 3.5–5.2)
ALBUMIN/GLOB SERPL: 2.4 G/DL
ALP SERPL-CCNC: 97 U/L (ref 39–117)
ALT SERPL-CCNC: 14 U/L (ref 1–41)
AST SERPL-CCNC: 20 U/L (ref 1–40)
BILIRUB SERPL-MCNC: 0.3 MG/DL (ref 0.2–1.2)
BUN SERPL-MCNC: 15 MG/DL (ref 8–23)
BUN/CREAT SERPL: 18.3 (ref 7–25)
CALCIUM SERPL-MCNC: 9.1 MG/DL (ref 8.6–10.5)
CHLORIDE SERPL-SCNC: 104 MMOL/L (ref 98–107)
CHOLEST SERPL-MCNC: 138 MG/DL (ref 0–200)
CO2 SERPL-SCNC: 24.7 MMOL/L (ref 22–29)
CREAT SERPL-MCNC: 0.82 MG/DL (ref 0.76–1.27)
GLOBULIN SER CALC-MCNC: 1.9 GM/DL
GLUCOSE SERPL-MCNC: 92 MG/DL (ref 65–99)
HDLC SERPL-MCNC: 46 MG/DL (ref 40–60)
LDLC SERPL CALC-MCNC: 58 MG/DL (ref 0–100)
POTASSIUM SERPL-SCNC: 4.2 MMOL/L (ref 3.5–5.2)
PROT SERPL-MCNC: 6.4 G/DL (ref 6–8.5)
SODIUM SERPL-SCNC: 141 MMOL/L (ref 136–145)
TRIGL SERPL-MCNC: 168 MG/DL (ref 0–150)
TSH SERPL DL<=0.005 MIU/L-ACNC: 0.71 MIU/ML (ref 0.27–4.2)
VLDLC SERPL CALC-MCNC: 33.6 MG/DL (ref 5–40)

## 2019-06-05 ENCOUNTER — OFFICE VISIT (OUTPATIENT)
Dept: CARDIOLOGY | Facility: CLINIC | Age: 74
End: 2019-06-05

## 2019-06-05 ENCOUNTER — CLINICAL SUPPORT NO REQUIREMENTS (OUTPATIENT)
Dept: CARDIOLOGY | Facility: CLINIC | Age: 74
End: 2019-06-05

## 2019-06-05 VITALS
SYSTOLIC BLOOD PRESSURE: 140 MMHG | BODY MASS INDEX: 36.68 KG/M2 | DIASTOLIC BLOOD PRESSURE: 70 MMHG | HEART RATE: 71 BPM | HEIGHT: 68 IN | WEIGHT: 242 LBS

## 2019-06-05 DIAGNOSIS — Z95.0 HISTORY OF PACEMAKER: ICD-10-CM

## 2019-06-05 DIAGNOSIS — G90.1 DYSAUTONOMIA (HCC): ICD-10-CM

## 2019-06-05 DIAGNOSIS — G20 PARKINSON DISEASE (HCC): ICD-10-CM

## 2019-06-05 DIAGNOSIS — E66.09 CLASS 2 OBESITY DUE TO EXCESS CALORIES WITHOUT SERIOUS COMORBIDITY WITH BODY MASS INDEX (BMI) OF 36.0 TO 36.9 IN ADULT: ICD-10-CM

## 2019-06-05 DIAGNOSIS — R09.89 LABILE BLOOD PRESSURE: ICD-10-CM

## 2019-06-05 DIAGNOSIS — I44.2 ATRIOVENTRICULAR BLOCK, COMPLETE (HCC): ICD-10-CM

## 2019-06-05 DIAGNOSIS — R60.0 LOWER EXTREMITY EDEMA: Primary | ICD-10-CM

## 2019-06-05 DIAGNOSIS — I44.2 COMPLETE HEART BLOCK (HCC): Primary | ICD-10-CM

## 2019-06-05 PROCEDURE — 93000 ELECTROCARDIOGRAM COMPLETE: CPT | Performed by: NURSE PRACTITIONER

## 2019-06-05 PROCEDURE — 99214 OFFICE O/P EST MOD 30 MIN: CPT | Performed by: NURSE PRACTITIONER

## 2019-06-05 PROCEDURE — 93280 PM DEVICE PROGR EVAL DUAL: CPT | Performed by: INTERNAL MEDICINE

## 2019-06-05 RX ORDER — ASPIRIN 81 MG/1
81 TABLET ORAL DAILY
COMMUNITY
End: 2020-01-17

## 2019-06-05 RX ORDER — LORATADINE 10 MG/1
10 CAPSULE, LIQUID FILLED ORAL DAILY
COMMUNITY

## 2019-06-05 NOTE — PROGRESS NOTES
Date of Office Visit: 2019  Encounter Provider: VERONA Michaels  Place of Service: [facility] CARDIOLOGY  Patient Name: Javier San  :1945      Chief Complaint   Patient presents with   • AV block, Mobitz II   :     Dear Dr. Lainey Gaffney,     HPI: Javier San is a pleasant 74 y.o. male who presents today for cardiac follow up. He is a new patient to me and his previous records have been reviewed.     He has a history of near syncope, labile hypertension with orthostasis at times, bradycardia, Mobitz 2 heart block, and status post pacemaker placement.  He has also been diagnosed with Parkinson's disease followed by neurology.  He is an established patient of Jono Madden and was last in the office in 2018.  She felt his labile hypertension was most likely due to dysautonomia from Parkinson's disease.    On 2019 he had VIBHA testing which was negative.  He had a 2D echocardiogram on 2019 which revealed an EF of 54%, grade 1A diastolic dysfunction, limited imaging of cardiac valves, trace mitral and tricuspid insufficiency.    In 2019, he presented to the [facility] emergency department with right lower extremity edema with over the past couple of months.  He had a venous duplex completed which was negative for DVT.    He presents today with his wife accompanying him.  His biggest concern that he has been having this lower extremity edema over the past couple of months.  He says he is sedentary and follows a low-sodium diet.  He thinks it started happening when he changed from Lyrica to gabapentin.  He has some mild dyspnea with exertion, coughing, and dizziness with positional changes.  He denies chest pain, PND, orthopnea, palpitations, syncope, or bleeding.  He had a pacemaker check today which showed no abnormalities in approximately 6 years left on the generator.  Blood pressure is borderline elevated today.    Past Medical  History:   Diagnosis Date   • Abnormal blood chemistry    • Abnormal liver enzymes    • Acute bacterial prostatitis    • Anemia    • Anxiety 03/24/2014    Warning,Lainey   • Arthritis    • Asthma 3/1/2016   • Basal cell carcinoma    • Chronic bronchitis (CMS/HCC) 3/1/2016   • Gynecomastia    • H/O degenerative disc disease    • H/O hernia repair    • Hypertension 3/1/2016    Impression: 04/08/2015 - .  Impression: 03/24/2014 - Controlled with HCTZ.;    • Hypokalemia    • Hypothyroidism    • Labile hypertension 03/01/2016    with periods of orthostasis and near syncope   • Left bundle branch block    • Low back pain    • Lumbar radiculopathy    • Lumbar stenosis    • Lung nodule     CT of chest 2/9/15   • Malignant neoplasm of cheek (CMS/HCC) 3/1/2016    Description: - Basal Cell   • Mobitz (type) II atrioventricular block     s/p [employer] dual chamber PPM 2016   • Obstructive sleep apnea    • Parkinson disease (CMS/HCC)    • Rupture of kidney     in high school football   • Skin cancer of nose     Basal Cell   • Stricture of ureter     WITH HYDRONEPHROSIS   • Testicular pain    • Venous insufficiency    • Vitamin D deficiency        Past Surgical History:   Procedure Laterality Date   • BACK SURGERY      L4-L5 with Dr. Solomon   • CARDIAC ELECTROPHYSIOLOGY PROCEDURE N/A 6/17/2016    Procedure: Pacemaker DC new  [place];  Surgeon: Zachary Narayanan MD;  Location: [facility] INVASIVE LOCATION;  Service:    • COLON SURGERY      Complete colonoscopy- Dr. knox/[facility]   • CYSTOSCOPY TRANSURETHRAL RESECTION OF PROSTATE     • CYSTOSCOPY TRANSURETHRAL RESECTION OF PROSTATE N/A 12/7/2016    Procedure: CYSTOSCOPY TRANSURETHRAL RESECTION OF PROSTATE;  Surgeon: Law Dee MD;  Location: [facility] OR;  Service:    • HERNIA REPAIR     • NASAL SEPTUM SURGERY     • NOSE SURGERY      deviated septum   • PROSTATE SURGERY      transurethral destruction prostate tissue   • REPLACEMENT TOTAL KNEE BILATERAL      right in 2008 and  left in    • TONSILLECTOMY     • TOTAL SHOULDER REPLACEMENT Bilateral     left in  and right in 2013       Social History     Socioeconomic History   • Marital status:      Spouse name: Not on file   • Number of children: Not on file   • Years of education: Not on file   • Highest education level: Not on file   Tobacco Use   • Smoking status: Former Smoker     Packs/day: 1.00     Types: Cigarettes     Start date: 1964     Last attempt to quit: 1984     Years since quittin.9   • Smokeless tobacco: Never Used   Substance and Sexual Activity   • Alcohol use: Yes     Comment: No caffeine use   • Drug use: No   • Sexual activity: Defer       Family History   Problem Relation Age of Onset   • Alcohol abuse Mother    • Cancer Mother         skin   • Stroke Mother    • Alcohol abuse Father    • Emphysema Father    • COPD Father    • Cancer Father         skin       The following portion of the patient's history were reviewed and updated as appropriate: past medical history, past surgical history, past social history, past family history, allergies, current medications, and problem list.    Review of Systems   Constitution: Positive for malaise/fatigue. Negative for chills, diaphoresis, fever, night sweats, weight gain and weight loss.   HENT: Negative for hearing loss, nosebleeds, sore throat and tinnitus.    Eyes: Negative for blurred vision, double vision, pain and visual disturbance.   Cardiovascular: Positive for dyspnea on exertion. Negative for chest pain, claudication, cyanosis, irregular heartbeat, leg swelling, near-syncope, orthopnea, palpitations, paroxysmal nocturnal dyspnea and syncope.   Respiratory: Positive for cough and snoring. Negative for hemoptysis, shortness of breath and wheezing.    Endocrine: Negative for cold intolerance, heat intolerance and polyuria.   Hematologic/Lymphatic: Negative for bleeding problem. Does not bruise/bleed easily.   Skin: Negative for color  change, dry skin, flushing and itching.   Musculoskeletal: Positive for joint pain. Negative for falls, joint swelling, muscle cramps, muscle weakness and myalgias.   Gastrointestinal: Negative for abdominal pain, constipation, heartburn, melena, nausea and vomiting.   Genitourinary: Positive for frequency. Negative for dysuria and hematuria.        Erectile dysfunction   Neurological: Positive for excessive daytime sleepiness. Negative for dizziness, light-headedness, loss of balance, numbness, paresthesias, seizures and vertigo.   Psychiatric/Behavioral: Negative for altered mental status, depression, memory loss and substance abuse. The patient does not have insomnia and is not nervous/anxious.    Allergic/Immunologic: Negative for environmental allergies.       Allergies   Allergen Reactions   • Levofloxacin Myalgia         Current Outpatient Medications:   •  acetaminophen (TYLENOL) 325 MG tablet, Take 650 mg by mouth Every 6 (Six) Hours As Needed for mild pain (1-3)., Disp: , Rfl:   •  albuterol (ACCUNEB) 1.25 MG/3ML nebulizer solution, Take 1 ampule by nebulization Every 6 (Six) Hours As Needed for wheezing., Disp: , Rfl:   •  albuterol (PROVENTIL HFA;VENTOLIN HFA) 108 (90 BASE) MCG/ACT inhaler, Inhale 2 puffs Every 6 (Six) Hours As Needed. ProAir  (90 Base) MCG/ACT Inhalation Aerosol Solution; Patient Sig: ProAir  (90 Base) MCG/ACT Inhalation Aerosol Solution INHALE 1 TO 2 PUFFS EVERY 4 TO 6 HOURS AS NEEDED.; 0; 29-Sep-2014; Active, Disp: , Rfl:   •  aspirin 81 MG EC tablet, Take 81 mg by mouth Daily., Disp: , Rfl:   •  atorvastatin (LIPITOR) 10 MG tablet, TAKE 1 TABLET BY MOUTH EVERY DAY, Disp: 90 tablet, Rfl: 0  •  azelastine (ASTELIN) 0.1 % nasal spray, 1 spray into each nostril 2 (Two) Times a Day. Use 1 to 2 sprays, Disp: , Rfl:   •  carbidopa-levodopa (SINEMET)  MG per tablet, TK 1 T PO four time daily, Disp: , Rfl: 2  •  carbidopa-levodopa CR (SINEMET CR)  MG per CR tablet,  "Take 1 tablet by mouth 3 (Three) Times a Day., Disp: , Rfl:   •  Cholecalciferol (VITAMIN D) 2000 units tablet, Take 2,000 Units by mouth Daily., Disp: , Rfl:   •  Cyanocobalamin (B-12 PO), Take  by mouth., Disp: , Rfl:   •  DULoxetine (CYMBALTA) 30 MG capsule, Take 30 mg by mouth., Disp: , Rfl:   •  Fluticasone Furoate-Vilanterol 100-25 MCG/INH aerosol powder , Breo Ellipta 100 mcg-25 mcg/dose powder for inhalation, Disp: , Rfl:   •  gabapentin (NEURONTIN) 300 MG capsule, Take 2 capsules by mouth 3 (Three) Times a Day., Disp: 180 capsule, Rfl: 5  •  HYDROcodone-acetaminophen (NORCO) 7.5-325 MG per tablet, TK 1 T PO Q DAY PRN, Disp: , Rfl: 0  •  levothyroxine (SYNTHROID, LEVOTHROID) 137 MCG tablet, TAKE 1 TABLET BY MOUTH EVERY DAY, Disp: 90 tablet, Rfl: 0  •  Loratadine 10 MG capsule, Take 10 mg by mouth Daily., Disp: , Rfl:   •  losartan (COZAAR) 50 MG tablet, TAKE 1 TABLET BY MOUTH EVERY DAY, Disp: 90 tablet, Rfl: 0  •  PERFOROMIST 20 MCG/2ML nebulizer solution, INHALE 1 VIAL VIA NEB BID, Disp: , Rfl: 11  •  sildenafil (VIAGRA) 25 MG tablet, Take 25 mg by mouth daily as needed for erectile dysfunction., Disp: , Rfl:   •  vitamin C (ASCORBIC ACID) 250 MG tablet, Take 250 mg by mouth daily., Disp: , Rfl:         Objective:     Vitals:    06/05/19 1337   BP: 140/70   BP Location: Left arm   Pulse: 71   Weight: 110 kg (242 lb)   Height: 172.7 cm (68\")     Body mass index is 36.8 kg/m².    PHYSICAL EXAM:    Vitals Reviewed.   General Appearance: No acute distress, well developed and well nourished.  Obese.  Eyes: Conjunctiva and lids: No erythema, swelling, or discharge. Sclera non-icteric.   HENT: Atraumatic, normocephalic. External eyes, ears, and nose normal. No hearing loss noted. Mucous membranes normal. Lips not cyanotic. Neck supple with no tenderness.  Respiratory: No signs of respiratory distress. Respiration rhythm and depth normal.   Clear to auscultation. No rales, crackles, rhonchi, or wheezing " auscultated.   Cardiovascular:  Jugular Venous Pressure: Normal  Heart Rate and Rhythm: Normal, Heart Sounds: Normal S1 and S2. No S3 or S4 noted.  Murmurs: No murmurs noted. No rubs, thrills, or gallops.   Arterial Pulses: Carotid pulses normal. No carotid bruit noted. Lower Extremities: Bilateral Lower Extremity edema noted.  Gastrointestinal:  Abdomen distended, non-tender. Normal bowel sounds. No hepatomegaly.   Musculoskeletal: Normal movement of extremities  Skin and Nails: General appearance normal. No pallor, cyanosis, diaphoresis. Skin temperature normal. No clubbing of fingernails.   Psychiatric: Patient alert and oriented to person, place, and time. Speech and behavior appropriate. Normal mood and affect.       ECG 12 Lead  Date/Time: 6/5/2019 1:38 PM  Performed by: Sindy Hernández APRN  Authorized by: Sindy Hernández APRN   Comparison: compared with previous ECG from 6/17/2016  Comparison to previous ECG: Sinus bradycardia, heart rate 36  Rhythm: paced  Rate: normal  BPM: 71  ST Segments: ST segments normal  T Waves: T waves normal    Clinical impression: abnormal EKG              Assessment:       Diagnosis Plan   1. Lower extremity edema     2. Labile blood pressure     3. Dysautonomia (CMS/HCC)     4. Parkinson disease (CMS/HCC)     5. Atrioventricular block, complete (CMS/HCC)     6. History of pacemaker     7. Class 2 obesity due to excess calories without serious comorbidity with body mass index (BMI) of 36.0 to 36.9 in adult            Plan:       1.  Lower Extremity Edema: He has been noticing lower extremity edema since January.  He had an echocardiogram which showed a normal ejection fraction, normal proBNP in April, normal VIBHA and venous duplex.  I think this most likely is due to venous insufficiency, sedentary lifestyle, possible high sodium diet, and obesity.   I think he would benefit from compression stockings, elevation of legs, weight loss, and a low-sodium diet.  He plans to  follow-up with Dr. Gaffney on Friday to discuss possible diuretic therapy.    2/3.  Labile Blood Pressure and Dysautonomia: He had a history of labile blood pressure with orthostasis in the past.  His blood pressure today is borderline elevated.  Dr. Madden noted on her last visit that he may have a component of dysautonomia due to his Parkinson's disease.  Continue to monitor blood pressure.    4.  Parkinson's Disease: Followed by his neurologist.    5/6.  A-V Block/Pacemaker: He has a history of AV heart block and underwent pacemaker placement.  His pacemaker check today was normal and showed that he is ventricular pacing 99% of the time and atrial pacing 1% of the time.  He has about 6 years of battery life left.    7.  Obesity: His BMI is 36.8.  I think he would really benefit from exercise.  I think is hard for him to exercise because of his neuropathy and Parkinson's.    8.  He said he may be moving to [place], [place] at some point and wants to see if Dr. Madden knows of a cardiologist down there.     9.  I recommend follow-up with Dr. Madden in 1 year, unless otherwise needed sooner.  He mentioned that he has not seen her in over a year and would rather follow-up with her in 6-9 months.  An appointment will be made.    As always, it has been a pleasure to participate in your patient's care. Thank you.       Sincerely,         VERONA Valente        **Dragon Disclaimer:**  Much of this encounter note is an electronic transcription/translation of spoken language to printed text. The electronic translation of spoken language may permit erroneous, or at times, nonsensical words or phrases to be inadvertently transcribed. Although I have reviewed the note for such errors, some may still exist.

## 2019-06-07 ENCOUNTER — OFFICE VISIT (OUTPATIENT)
Dept: INTERNAL MEDICINE | Facility: CLINIC | Age: 74
End: 2019-06-07

## 2019-06-07 VITALS
OXYGEN SATURATION: 99 % | DIASTOLIC BLOOD PRESSURE: 82 MMHG | SYSTOLIC BLOOD PRESSURE: 120 MMHG | BODY MASS INDEX: 36.83 KG/M2 | HEIGHT: 68 IN | HEART RATE: 74 BPM | WEIGHT: 243 LBS

## 2019-06-07 DIAGNOSIS — E78.00 PURE HYPERCHOLESTEROLEMIA: ICD-10-CM

## 2019-06-07 DIAGNOSIS — R60.0 LOWER EXTREMITY EDEMA: ICD-10-CM

## 2019-06-07 DIAGNOSIS — I10 ESSENTIAL HYPERTENSION: Primary | ICD-10-CM

## 2019-06-07 DIAGNOSIS — E03.9 ACQUIRED HYPOTHYROIDISM: ICD-10-CM

## 2019-06-07 PROCEDURE — 99214 OFFICE O/P EST MOD 30 MIN: CPT | Performed by: INTERNAL MEDICINE

## 2019-06-07 NOTE — PROGRESS NOTES
Subjective     Javier San is a 74 y.o. male who presents with   Chief Complaint   Patient presents with   • Hypertension   • Hyperlipidemia   • Hypothyroidism       History of Present Illness     HTN.  Control is good for him.    HLD. Excellent control on atorvastatin.    Hypthyroidism.  Under good control.    Chronic edema biggest issue.  Recently saw cardiology who feel it is med related and venous insufficiency.  Cardiac etiology ruled out.     Review of Systems   Respiratory: Negative.    Cardiovascular: Negative.        The following portions of the patient's history were reviewed and updated as appropriate: allergies, current medications and problem list.    Patient Active Problem List    Diagnosis Date Noted   • Neuropathic pain 04/10/2019   • Raynaud's disease without gangrene 04/10/2019   • Atrioventricular block, complete (CMS/HCC) 03/01/2019   • Class 2 obesity due to excess calories without serious comorbidity with body mass index (BMI) of 36.0 to 36.9 in adult 11/05/2018   • History of pacemaker 04/16/2018   • Parkinson disease (CMS/[facility]) 10/30/2017   • Benign prostatic hyperplasia with lower urinary tract symptoms 12/07/2016   • Hyperlipemia 10/27/2016   • Thyroid nodule 10/26/2016     Note Last Updated: 10/31/2017     Noted on CT chest 3/2016.  Stable 9/2016.  Followed by ENT.       • History of melanoma 04/15/2016     Note Last Updated: 4/15/2016     Times two.       • Atopic rhinitis 03/01/2016   • Arthritis 03/01/2016   • Asthma 03/01/2016   • Degeneration of intervertebral disc of lumbar region 03/01/2016   • Essential hypertension 03/01/2016   • Hypothyroidism 03/01/2016   • Impaired fasting glucose 03/01/2016   • Lumbar radiculopathy 03/01/2016   • Spinal stenosis of lumbar region 03/01/2016   • Lung nodule 03/01/2016     Note Last Updated: 10/31/2017     Follow over two years by Dr. Garnica.      • Obstructive sleep apnea syndrome 03/01/2016     Note Last Updated: 3/1/2016     Description:  Patient is maintained on BIPAP.         Current Outpatient Medications on File Prior to Visit   Medication Sig Dispense Refill   • acetaminophen (TYLENOL) 325 MG tablet Take 650 mg by mouth Every 6 (Six) Hours As Needed for mild pain (1-3).     • albuterol (ACCUNEB) 1.25 MG/3ML nebulizer solution Take 1 ampule by nebulization Every 6 (Six) Hours As Needed for wheezing.     • albuterol (PROVENTIL HFA;VENTOLIN HFA) 108 (90 BASE) MCG/ACT inhaler Inhale 2 puffs Every 6 (Six) Hours As Needed. ProAir  (90 Base) MCG/ACT Inhalation Aerosol Solution; Patient Sig: ProAir  (90 Base) MCG/ACT Inhalation Aerosol Solution INHALE 1 TO 2 PUFFS EVERY 4 TO 6 HOURS AS NEEDED.; 0; 29-Sep-2014; Active     • aspirin 81 MG EC tablet Take 81 mg by mouth Daily.     • atorvastatin (LIPITOR) 10 MG tablet TAKE 1 TABLET BY MOUTH EVERY DAY 90 tablet 0   • azelastine (ASTELIN) 0.1 % nasal spray 1 spray into each nostril 2 (Two) Times a Day. Use 1 to 2 sprays     • carbidopa-levodopa (SINEMET)  MG per tablet TK 1 T PO four time daily  2   • carbidopa-levodopa CR (SINEMET CR)  MG per CR tablet Take 1 tablet by mouth 3 (Three) Times a Day.     • Cholecalciferol (VITAMIN D) 2000 units tablet Take 2,000 Units by mouth Daily.     • Cyanocobalamin (B-12 PO) Take  by mouth.     • DULoxetine (CYMBALTA) 30 MG capsule Take 30 mg by mouth.     • Fluticasone Furoate-Vilanterol 100-25 MCG/INH aerosol powder  Breo Ellipta 100 mcg-25 mcg/dose powder for inhalation     • gabapentin (NEURONTIN) 300 MG capsule Take 2 capsules by mouth 3 (Three) Times a Day. 180 capsule 5   • HYDROcodone-acetaminophen (NORCO) 7.5-325 MG per tablet TK 1 T PO Q DAY PRN  0   • levothyroxine (SYNTHROID, LEVOTHROID) 137 MCG tablet TAKE 1 TABLET BY MOUTH EVERY DAY 90 tablet 0   • Loratadine 10 MG capsule Take 10 mg by mouth Daily.     • losartan (COZAAR) 50 MG tablet TAKE 1 TABLET BY MOUTH EVERY DAY 90 tablet 0   • PERFOROMIST 20 MCG/2ML nebulizer solution INHALE  "1 VIAL VIA NEB BID  11   • sildenafil (VIAGRA) 25 MG tablet Take 25 mg by mouth daily as needed for erectile dysfunction.     • vitamin C (ASCORBIC ACID) 250 MG tablet Take 250 mg by mouth daily.       No current facility-administered medications on file prior to visit.        Objective     /82   Pulse 74   Ht 172.7 cm (67.99\")   Wt 110 kg (243 lb)   SpO2 99%   BMI 36.96 kg/m²     Physical Exam   Constitutional: He is oriented to person, place, and time. He appears well-developed and well-nourished.   HENT:   Head: Atraumatic.   Cardiovascular: Normal rate, regular rhythm and normal heart sounds.   Pulmonary/Chest: Effort normal and breath sounds normal.   Neurological: He is alert and oriented to person, place, and time.   Skin: Skin is warm and dry.   Psychiatric: He has a normal mood and affect.       Assessment/Plan   Javier was seen today for hypertension, hyperlipidemia and hypothyroidism.    Diagnoses and all orders for this visit:    Essential hypertension    Pure hypercholesterolemia    Acquired hypothyroidism    Lower extremity edema        Discussion    Htn.  Continue current regimen.  HLD.  Continue atorvastatin.   Hypothyroidism.  Continue levothyroxine at current dose.   Edema.  Noncardiac etiology.  Med, weight, venous insufficiency contribute.  Add compression stockings daily 20-30 mmHg.  Low sodium diet.  Lose weight.  Keep legs elevated at night.         Future Appointments   Date Time Provider Department Center   9/12/2019 12:30 AM [facility]LL [facility] None   1/14/2020  8:50 AM [facility] None   1/15/2020  1:00 PM JARRELL IN OFFICE, [facility] None   1/15/2020  1:30 PM Jono Madden MD [facility] None   1/17/2020 11:15 AM Lainey Gaffney MD [facility] None         "

## 2019-06-10 RX ORDER — GABAPENTIN 300 MG/1
CAPSULE ORAL
Qty: 180 CAPSULE | Refills: 0 | Status: SHIPPED | OUTPATIENT
Start: 2019-06-10 | End: 2020-07-17

## 2019-07-12 RX ORDER — LEVOTHYROXINE SODIUM 137 UG/1
TABLET ORAL
Qty: 90 TABLET | Refills: 0 | Status: SHIPPED | OUTPATIENT
Start: 2019-07-12 | End: 2019-10-20 | Stop reason: SDUPTHER

## 2019-07-12 RX ORDER — ATORVASTATIN CALCIUM 10 MG/1
TABLET, FILM COATED ORAL
Qty: 90 TABLET | Refills: 0 | Status: SHIPPED | OUTPATIENT
Start: 2019-07-12 | End: 2019-10-20 | Stop reason: SDUPTHER

## 2019-08-05 RX ORDER — LOSARTAN POTASSIUM 50 MG/1
TABLET ORAL
Qty: 90 TABLET | Refills: 0 | Status: SHIPPED | OUTPATIENT
Start: 2019-08-05 | End: 2019-11-02 | Stop reason: SDUPTHER

## 2019-09-12 ENCOUNTER — CLINICAL SUPPORT NO REQUIREMENTS (OUTPATIENT)
Dept: CARDIOLOGY | Facility: CLINIC | Age: 74
End: 2019-09-12

## 2019-09-12 DIAGNOSIS — I44.2 THIRD DEGREE AV BLOCK (HCC): Primary | ICD-10-CM

## 2019-09-12 PROCEDURE — 93296 REM INTERROG EVL PM/IDS: CPT | Performed by: INTERNAL MEDICINE

## 2019-09-12 PROCEDURE — 93294 REM INTERROG EVL PM/LDLS PM: CPT | Performed by: INTERNAL MEDICINE

## 2019-10-21 RX ORDER — LEVOTHYROXINE SODIUM 137 UG/1
TABLET ORAL
Qty: 90 TABLET | Refills: 0 | Status: SHIPPED | OUTPATIENT
Start: 2019-10-21 | End: 2020-01-13

## 2019-10-21 RX ORDER — ATORVASTATIN CALCIUM 10 MG/1
TABLET, FILM COATED ORAL
Qty: 90 TABLET | Refills: 0 | Status: SHIPPED | OUTPATIENT
Start: 2019-10-21 | End: 2020-01-20

## 2019-10-25 RX ORDER — FUROSEMIDE 20 MG/1
20 TABLET ORAL DAILY
Qty: 30 TABLET | Refills: 5 | Status: SHIPPED | OUTPATIENT
Start: 2019-10-25 | End: 2019-11-15 | Stop reason: SDUPTHER

## 2019-11-04 RX ORDER — LOSARTAN POTASSIUM 50 MG/1
TABLET ORAL
Qty: 90 TABLET | Refills: 0 | Status: SHIPPED | OUTPATIENT
Start: 2019-11-04 | End: 2020-02-04

## 2019-11-15 ENCOUNTER — OFFICE VISIT (OUTPATIENT)
Dept: INTERNAL MEDICINE | Facility: CLINIC | Age: 74
End: 2019-11-15

## 2019-11-15 VITALS
OXYGEN SATURATION: 92 % | DIASTOLIC BLOOD PRESSURE: 64 MMHG | SYSTOLIC BLOOD PRESSURE: 130 MMHG | HEART RATE: 78 BPM | WEIGHT: 254 LBS | BODY MASS INDEX: 38.49 KG/M2 | HEIGHT: 68 IN

## 2019-11-15 DIAGNOSIS — R60.0 BILATERAL LEG EDEMA: Primary | ICD-10-CM

## 2019-11-15 LAB
ALBUMIN SERPL-MCNC: 4.3 G/DL (ref 3.5–5.2)
ALBUMIN/GLOB SERPL: 1.9 G/DL
ALP SERPL-CCNC: 91 U/L (ref 39–117)
ALT SERPL-CCNC: 13 U/L (ref 1–41)
APPEARANCE UR: CLEAR
AST SERPL-CCNC: 12 U/L (ref 1–40)
BACTERIA #/AREA URNS HPF: NORMAL /HPF
BILIRUB SERPL-MCNC: 0.5 MG/DL (ref 0.2–1.2)
BILIRUB UR QL STRIP: NEGATIVE
BUN SERPL-MCNC: 14 MG/DL (ref 8–23)
BUN/CREAT SERPL: 14.4 (ref 7–25)
CALCIUM SERPL-MCNC: 8.9 MG/DL (ref 8.6–10.5)
CASTS URNS MICRO: NORMAL
CHLORIDE SERPL-SCNC: 101 MMOL/L (ref 98–107)
CO2 SERPL-SCNC: 24 MMOL/L (ref 22–29)
COLOR UR: YELLOW
CREAT SERPL-MCNC: 0.97 MG/DL (ref 0.76–1.27)
EPI CELLS #/AREA URNS HPF: NORMAL /HPF
GLOBULIN SER CALC-MCNC: 2.3 GM/DL
GLUCOSE SERPL-MCNC: 102 MG/DL (ref 65–99)
GLUCOSE UR QL: NEGATIVE
HGB UR QL STRIP: NEGATIVE
KETONES UR QL STRIP: NEGATIVE
LEUKOCYTE ESTERASE UR QL STRIP: NEGATIVE
NITRITE UR QL STRIP: NEGATIVE
PH UR STRIP: 5.5 [PH] (ref 5–8)
POTASSIUM SERPL-SCNC: 4.2 MMOL/L (ref 3.5–5.2)
PROT SERPL-MCNC: 6.6 G/DL (ref 6–8.5)
PROT UR QL STRIP: NEGATIVE
RBC #/AREA URNS HPF: NORMAL /HPF
SODIUM SERPL-SCNC: 141 MMOL/L (ref 136–145)
SP GR UR: 1.01 (ref 1–1.03)
TSH SERPL DL<=0.005 MIU/L-ACNC: 3.02 UIU/ML (ref 0.27–4.2)
UROBILINOGEN UR STRIP-MCNC: (no result) MG/DL
WBC #/AREA URNS HPF: NORMAL /HPF

## 2019-11-15 PROCEDURE — 99214 OFFICE O/P EST MOD 30 MIN: CPT | Performed by: INTERNAL MEDICINE

## 2019-11-15 RX ORDER — FUROSEMIDE 20 MG/1
40 TABLET ORAL DAILY
Qty: 60 TABLET | Refills: 5 | Status: SHIPPED | OUTPATIENT
Start: 2019-11-15 | End: 2020-02-10

## 2019-11-15 NOTE — PROGRESS NOTES
Subjective     Javier San is a 74 y.o. male who presents with   Chief Complaint   Patient presents with   • Leg Swelling       History of Present Illness     C/o leg swelling.  Going on for six months.  No PND.  No orthopnea.  GROVER but he has asthma.  He spends a lot of time sitting.  Reviewed echo this year. Lasix 20mg mild help.  Swelling makes legs hurt.     Review of Systems   Respiratory: Positive for cough and shortness of breath. Negative for chest tightness.    Cardiovascular: Negative for chest pain.       The following portions of the patient's history were reviewed and updated as appropriate: allergies, current medications and problem list.    Patient Active Problem List    Diagnosis Date Noted   • Neuropathic pain 04/10/2019   • Raynaud's disease without gangrene 04/10/2019   • Atrioventricular block, complete (CMS/[facility]) 03/01/2019   • Class 2 obesity due to excess calories without serious comorbidity with body mass index (BMI) of 36.0 to 36.9 in adult 11/05/2018   • History of pacemaker 04/16/2018   • Parkinson disease (CMS/[facility]) 10/30/2017   • Benign prostatic hyperplasia with lower urinary tract symptoms 12/07/2016   • Hyperlipemia 10/27/2016   • Thyroid nodule 10/26/2016     Note Last Updated: 10/31/2017     Noted on CT chest 3/2016.  Stable 9/2016.  Followed by ENT.       • History of melanoma 04/15/2016     Note Last Updated: 4/15/2016     Times two.       • Atopic rhinitis 03/01/2016   • Arthritis 03/01/2016   • Asthma 03/01/2016   • Degeneration of intervertebral disc of lumbar region 03/01/2016   • Essential hypertension 03/01/2016   • Hypothyroidism 03/01/2016   • Impaired fasting glucose 03/01/2016   • Lumbar radiculopathy 03/01/2016   • Spinal stenosis of lumbar region 03/01/2016   • Lung nodule 03/01/2016     Note Last Updated: 10/31/2017     Follow over two years by Dr. Garnica.      • Obstructive sleep apnea syndrome 03/01/2016     Note Last Updated: 3/1/2016     Description: Patient is  maintained on BIPAP.         Current Outpatient Medications on File Prior to Visit   Medication Sig Dispense Refill   • acetaminophen (TYLENOL) 325 MG tablet Take 650 mg by mouth Every 6 (Six) Hours As Needed for mild pain (1-3).     • albuterol (ACCUNEB) 1.25 MG/3ML nebulizer solution Take 1 ampule by nebulization Every 6 (Six) Hours As Needed for wheezing.     • albuterol (PROVENTIL HFA;VENTOLIN HFA) 108 (90 BASE) MCG/ACT inhaler Inhale 2 puffs Every 6 (Six) Hours As Needed. ProAir  (90 Base) MCG/ACT Inhalation Aerosol Solution; Patient Sig: ProAir  (90 Base) MCG/ACT Inhalation Aerosol Solution INHALE 1 TO 2 PUFFS EVERY 4 TO 6 HOURS AS NEEDED.; 0; 29-Sep-2014; Active     • aspirin 81 MG EC tablet Take 81 mg by mouth Daily.     • atorvastatin (LIPITOR) 10 MG tablet TAKE 1 TABLET BY MOUTH EVERY DAY 90 tablet 0   • azelastine (ASTELIN) 0.1 % nasal spray 1 spray into each nostril 2 (Two) Times a Day. Use 1 to 2 sprays     • carbidopa-levodopa (SINEMET)  MG per tablet TK 1 T PO four time daily  2   • carbidopa-levodopa CR (SINEMET CR)  MG per CR tablet Take 1 tablet by mouth 3 (Three) Times a Day.     • Cholecalciferol (VITAMIN D) 2000 units tablet Take 2,000 Units by mouth Daily.     • Cyanocobalamin (B-12 PO) Take  by mouth.     • DULoxetine (CYMBALTA) 30 MG capsule Take 30 mg by mouth.     • Fluticasone Furoate-Vilanterol 100-25 MCG/INH aerosol powder  Breo Ellipta 100 mcg-25 mcg/dose powder for inhalation     • gabapentin (NEURONTIN) 300 MG capsule TAKE 2 CAPSULES BY MOUTH THREE TIMES DAILY 180 capsule 0   • HYDROcodone-acetaminophen (NORCO) 7.5-325 MG per tablet TK 1 T PO Q DAY PRN  0   • levothyroxine (SYNTHROID, LEVOTHROID) 137 MCG tablet TAKE 1 TABLET BY MOUTH EVERY DAY 90 tablet 0   • Loratadine 10 MG capsule Take 10 mg by mouth Daily.     • losartan (COZAAR) 50 MG tablet TAKE 1 TABLET BY MOUTH EVERY DAY 90 tablet 0   • PERFOROMIST 20 MCG/2ML nebulizer solution INHALE 1 VIAL VIA NEB  "BID  11   • sildenafil (VIAGRA) 25 MG tablet Take 25 mg by mouth daily as needed for erectile dysfunction.     • vitamin C (ASCORBIC ACID) 250 MG tablet Take 250 mg by mouth daily.     • [DISCONTINUED] furosemide (LASIX) 20 MG tablet Take 1 tablet by mouth Daily. 30 tablet 5     No current facility-administered medications on file prior to visit.        Objective     /64   Pulse 78   Ht 172.7 cm (67.99\")   Wt 115 kg (254 lb)   SpO2 92%   BMI 38.63 kg/m²     Physical Exam   Constitutional: He is oriented to person, place, and time. He appears well-developed and well-nourished.   HENT:   Head: Atraumatic.   Cardiovascular: Normal rate, regular rhythm and normal heart sounds.   2+ LE edema   Pulmonary/Chest: Effort normal and breath sounds normal.   Neurological: He is alert and oriented to person, place, and time.   Skin: Skin is warm and dry.   Psychiatric: He has a normal mood and affect.       Assessment/Plan   Javier was seen today for leg swelling.    Diagnoses and all orders for this visit:    Bilateral leg edema  -     Comprehensive Metabolic Panel  -     TSH Rfx On Abnormal To Free T4  -     Urinalysis With Microscopic - Urine, Clean Catch    Other orders  -     furosemide (LASIX) 20 MG tablet; Take 2 tablets by mouth Daily.        Discussion    Patient presents in f/u of edema that is likely dependent.  No evidence of CHF.  Check labs today.  Increase lasix to 40mg daily.  Keep legs elevated in evening.  Watch salt in diet.  F/u in two weeks.  15/25 minutes was spent in counseling of the following topics:, test results, impressions, treatment options         Future Appointments   Date Time Provider Department Center   1/14/2020  8:50 AM [facility] None   1/15/2020  1:00 PM [facility] None   1/15/2020  1:30 PM Jono Madden MD [facility] None   1/17/2020 11:15 AM Lainey Gaffney MD [facility] None         "

## 2019-12-03 ENCOUNTER — OFFICE VISIT (OUTPATIENT)
Dept: INTERNAL MEDICINE | Facility: CLINIC | Age: 74
End: 2019-12-03

## 2019-12-03 VITALS
HEIGHT: 68 IN | OXYGEN SATURATION: 98 % | DIASTOLIC BLOOD PRESSURE: 70 MMHG | WEIGHT: 252 LBS | BODY MASS INDEX: 38.19 KG/M2 | HEART RATE: 77 BPM | SYSTOLIC BLOOD PRESSURE: 110 MMHG

## 2019-12-03 DIAGNOSIS — R60.0 BILATERAL LEG EDEMA: Primary | ICD-10-CM

## 2019-12-03 DIAGNOSIS — I10 ESSENTIAL HYPERTENSION: ICD-10-CM

## 2019-12-03 LAB
BUN SERPL-MCNC: 17 MG/DL (ref 8–23)
BUN/CREAT SERPL: 19.5 (ref 7–25)
CALCIUM SERPL-MCNC: 9.2 MG/DL (ref 8.6–10.5)
CHLORIDE SERPL-SCNC: 102 MMOL/L (ref 98–107)
CO2 SERPL-SCNC: 27.1 MMOL/L (ref 22–29)
CREAT SERPL-MCNC: 0.87 MG/DL (ref 0.76–1.27)
GLUCOSE SERPL-MCNC: 98 MG/DL (ref 65–99)
POTASSIUM SERPL-SCNC: 4.2 MMOL/L (ref 3.5–5.2)
SODIUM SERPL-SCNC: 141 MMOL/L (ref 136–145)

## 2019-12-03 PROCEDURE — 99213 OFFICE O/P EST LOW 20 MIN: CPT | Performed by: INTERNAL MEDICINE

## 2019-12-03 NOTE — PROGRESS NOTES
Subjective     Javier San is a 74 y.o. male who presents with   Chief Complaint   Patient presents with   • Bilateral leg edema     Follow up       History of Present Illness     F/u LE edema.  Some improvement on lasix 40mg daily.  He cannot tolerate compression stockings because of his neuropathy.    Not currently exercising.  He has done a Parkinson's water class.    HTN.  BP is running lower recently.      Review of Systems   Respiratory: Negative.    Cardiovascular: Negative.        The following portions of the patient's history were reviewed and updated as appropriate: allergies, current medications and problem list.    Patient Active Problem List    Diagnosis Date Noted   • Neuropathic pain 04/10/2019   • Raynaud's disease without gangrene 04/10/2019   • Atrioventricular block, complete (CMS/HCC) 03/01/2019   • Class 2 obesity due to excess calories without serious comorbidity with body mass index (BMI) of 36.0 to 36.9 in adult 11/05/2018   • History of pacemaker 04/16/2018   • Parkinson disease (CMS/[facility]) 10/30/2017   • Benign prostatic hyperplasia with lower urinary tract symptoms 12/07/2016   • Hyperlipemia 10/27/2016   • Thyroid nodule 10/26/2016     Note Last Updated: 10/31/2017     Noted on CT chest 3/2016.  Stable 9/2016.  Followed by ENT.       • History of melanoma 04/15/2016     Note Last Updated: 4/15/2016     Times two.       • Atopic rhinitis 03/01/2016   • Arthritis 03/01/2016   • Asthma 03/01/2016   • Degeneration of intervertebral disc of lumbar region 03/01/2016   • Essential hypertension 03/01/2016   • Hypothyroidism 03/01/2016   • Impaired fasting glucose 03/01/2016   • Lumbar radiculopathy 03/01/2016   • Spinal stenosis of lumbar region 03/01/2016   • Lung nodule 03/01/2016     Note Last Updated: 10/31/2017     Follow over two years by Dr. Garnica.      • Obstructive sleep apnea syndrome 03/01/2016     Note Last Updated: 3/1/2016     Description: Patient is maintained on BIPAP.          Current Outpatient Medications on File Prior to Visit   Medication Sig Dispense Refill   • acetaminophen (TYLENOL) 325 MG tablet Take 650 mg by mouth Every 6 (Six) Hours As Needed for mild pain (1-3).     • albuterol (ACCUNEB) 1.25 MG/3ML nebulizer solution Take 1 ampule by nebulization Every 6 (Six) Hours As Needed for wheezing.     • albuterol (PROVENTIL HFA;VENTOLIN HFA) 108 (90 BASE) MCG/ACT inhaler Inhale 2 puffs Every 6 (Six) Hours As Needed. ProAir  (90 Base) MCG/ACT Inhalation Aerosol Solution; Patient Sig: ProAir  (90 Base) MCG/ACT Inhalation Aerosol Solution INHALE 1 TO 2 PUFFS EVERY 4 TO 6 HOURS AS NEEDED.; 0; 29-Sep-2014; Active     • aspirin 81 MG EC tablet Take 81 mg by mouth Daily.     • atorvastatin (LIPITOR) 10 MG tablet TAKE 1 TABLET BY MOUTH EVERY DAY 90 tablet 0   • azelastine (ASTELIN) 0.1 % nasal spray 1 spray into each nostril 2 (Two) Times a Day. Use 1 to 2 sprays     • carbidopa-levodopa (SINEMET)  MG per tablet TK 1 T PO four time daily  2   • carbidopa-levodopa CR (SINEMET CR)  MG per CR tablet Take 1 tablet by mouth 3 (Three) Times a Day.     • Cholecalciferol (VITAMIN D) 2000 units tablet Take 2,000 Units by mouth Daily.     • Cyanocobalamin (B-12 PO) Take  by mouth.     • DULoxetine (CYMBALTA) 30 MG capsule Take 30 mg by mouth.     • Fluticasone Furoate-Vilanterol 100-25 MCG/INH aerosol powder  Breo Ellipta 100 mcg-25 mcg/dose powder for inhalation     • furosemide (LASIX) 20 MG tablet Take 2 tablets by mouth Daily. 60 tablet 5   • gabapentin (NEURONTIN) 300 MG capsule TAKE 2 CAPSULES BY MOUTH THREE TIMES DAILY 180 capsule 0   • HYDROcodone-acetaminophen (NORCO) 7.5-325 MG per tablet TK 1 T PO Q DAY PRN  0   • levothyroxine (SYNTHROID, LEVOTHROID) 137 MCG tablet TAKE 1 TABLET BY MOUTH EVERY DAY 90 tablet 0   • Loratadine 10 MG capsule Take 10 mg by mouth Daily.     • losartan (COZAAR) 50 MG tablet TAKE 1 TABLET BY MOUTH EVERY DAY 90 tablet 0   •  "PERFOROMIST 20 MCG/2ML nebulizer solution INHALE 1 VIAL VIA NEB BID  11   • sildenafil (VIAGRA) 25 MG tablet Take 25 mg by mouth daily as needed for erectile dysfunction.     • vitamin C (ASCORBIC ACID) 250 MG tablet Take 250 mg by mouth daily.       No current facility-administered medications on file prior to visit.        Objective     /70   Pulse 77   Ht 172.7 cm (67.99\")   Wt 114 kg (252 lb)   SpO2 98%   BMI 38.33 kg/m²     Physical Exam   Constitutional: He is oriented to person, place, and time. He appears well-developed and well-nourished.   HENT:   Head: Atraumatic.   Cardiovascular: Normal rate, regular rhythm and normal heart sounds.   Pulmonary/Chest: Effort normal and breath sounds normal.   Neurological: He is alert and oriented to person, place, and time.   Skin: Skin is warm and dry.   Psychiatric: He has a normal mood and affect.       Assessment/Plan   Javier was seen today for bilateral leg edema.    Diagnoses and all orders for this visit:    Bilateral leg edema  -     Basic Metabolic Panel    Essential hypertension        Discussion    Patient presents in f/u of dependent lower extremity edema.  The patient will continue current regimen.    He is encouraged to get back into water exercise.  BMP is ordered.    HTN.  Decrease losartan to 25 mg daily.             Future Appointments   Date Time Provider Department Center   1/14/2020  8:50 AM [facility] None   1/15/2020  1:00 PM [facility] None   1/15/2020  1:30 PM Jono Madden MD [facility] None   1/17/2020 11:15 AM Lainey Gaffney MD [facility] None         "

## 2019-12-27 RX ORDER — AMLODIPINE BESYLATE 5 MG/1
5 TABLET ORAL DAILY
Qty: 30 TABLET | Refills: 11 | Status: SHIPPED | OUTPATIENT
Start: 2019-12-27 | End: 2020-04-29

## 2020-01-13 RX ORDER — LEVOTHYROXINE SODIUM 137 UG/1
TABLET ORAL
Qty: 90 TABLET | Refills: 0 | Status: SHIPPED | OUTPATIENT
Start: 2020-01-13 | End: 2020-04-10

## 2020-01-14 DIAGNOSIS — E03.9 HYPOTHYROIDISM, UNSPECIFIED TYPE: ICD-10-CM

## 2020-01-14 DIAGNOSIS — I10 HYPERTENSION, UNSPECIFIED TYPE: ICD-10-CM

## 2020-01-14 DIAGNOSIS — E78.5 HYPERLIPIDEMIA, UNSPECIFIED HYPERLIPIDEMIA TYPE: Primary | ICD-10-CM

## 2020-01-15 LAB
ALBUMIN SERPL-MCNC: 4.2 G/DL (ref 3.5–5.2)
ALBUMIN/GLOB SERPL: 1.9 G/DL
ALP SERPL-CCNC: 73 U/L (ref 39–117)
ALT SERPL-CCNC: 8 U/L (ref 1–41)
APPEARANCE UR: CLEAR
AST SERPL-CCNC: 15 U/L (ref 1–40)
BACTERIA #/AREA URNS HPF: NORMAL /HPF
BASOPHILS # BLD AUTO: 0.07 10*3/MM3 (ref 0–0.2)
BASOPHILS NFR BLD AUTO: 0.8 % (ref 0–1.5)
BILIRUB SERPL-MCNC: 0.5 MG/DL (ref 0.2–1.2)
BILIRUB UR QL STRIP: NEGATIVE
BUN SERPL-MCNC: 20 MG/DL (ref 8–23)
BUN/CREAT SERPL: 18.7 (ref 7–25)
CALCIUM SERPL-MCNC: 9.4 MG/DL (ref 8.6–10.5)
CHLORIDE SERPL-SCNC: 101 MMOL/L (ref 98–107)
CHOLEST SERPL-MCNC: 139 MG/DL (ref 0–200)
CO2 SERPL-SCNC: 26.6 MMOL/L (ref 22–29)
COLOR UR: YELLOW
CREAT SERPL-MCNC: 1.07 MG/DL (ref 0.76–1.27)
EOSINOPHIL # BLD AUTO: 0.05 10*3/MM3 (ref 0–0.4)
EOSINOPHIL NFR BLD AUTO: 0.6 % (ref 0.3–6.2)
EPI CELLS #/AREA URNS HPF: NORMAL /HPF (ref 0–10)
ERYTHROCYTE [DISTWIDTH] IN BLOOD BY AUTOMATED COUNT: 12.5 % (ref 12.3–15.4)
GLOBULIN SER CALC-MCNC: 2.2 GM/DL
GLUCOSE SERPL-MCNC: 89 MG/DL (ref 65–99)
GLUCOSE UR QL: NEGATIVE
HCT VFR BLD AUTO: 42.9 % (ref 37.5–51)
HDLC SERPL-MCNC: 54 MG/DL (ref 40–60)
HGB BLD-MCNC: 14.5 G/DL (ref 13–17.7)
HGB UR QL STRIP: NEGATIVE
IMM GRANULOCYTES # BLD AUTO: 0.04 10*3/MM3 (ref 0–0.05)
IMM GRANULOCYTES NFR BLD AUTO: 0.5 % (ref 0–0.5)
KETONES UR QL STRIP: NEGATIVE
LDLC SERPL CALC-MCNC: 66 MG/DL (ref 0–100)
LEUKOCYTE ESTERASE UR QL STRIP: NEGATIVE
LYMPHOCYTES # BLD AUTO: 2.58 10*3/MM3 (ref 0.7–3.1)
LYMPHOCYTES NFR BLD AUTO: 29.9 % (ref 19.6–45.3)
MCH RBC QN AUTO: 31.8 PG (ref 26.6–33)
MCHC RBC AUTO-ENTMCNC: 33.8 G/DL (ref 31.5–35.7)
MCV RBC AUTO: 94.1 FL (ref 79–97)
MICRO URNS: NORMAL
MICRO URNS: NORMAL
MONOCYTES # BLD AUTO: 1.1 10*3/MM3 (ref 0.1–0.9)
MONOCYTES NFR BLD AUTO: 12.7 % (ref 5–12)
NEUTROPHILS # BLD AUTO: 4.8 10*3/MM3 (ref 1.7–7)
NEUTROPHILS NFR BLD AUTO: 55.5 % (ref 42.7–76)
NITRITE UR QL STRIP: NEGATIVE
NRBC BLD AUTO-RTO: 0.1 /100 WBC (ref 0–0.2)
PH UR STRIP: 6 [PH] (ref 5–7.5)
PLATELET # BLD AUTO: 218 10*3/MM3 (ref 140–450)
POTASSIUM SERPL-SCNC: 3.8 MMOL/L (ref 3.5–5.2)
PROT SERPL-MCNC: 6.4 G/DL (ref 6–8.5)
PROT UR QL STRIP: NEGATIVE
RBC # BLD AUTO: 4.56 10*6/MM3 (ref 4.14–5.8)
RBC #/AREA URNS HPF: NORMAL /HPF (ref 0–2)
SODIUM SERPL-SCNC: 141 MMOL/L (ref 136–145)
SP GR UR: 1.01 (ref 1–1.03)
TRIGL SERPL-MCNC: 94 MG/DL (ref 0–150)
TSH SERPL DL<=0.005 MIU/L-ACNC: 3.6 UIU/ML (ref 0.27–4.2)
URINALYSIS REFLEX: NORMAL
UROBILINOGEN UR STRIP-MCNC: 0.2 MG/DL (ref 0.2–1)
VLDLC SERPL CALC-MCNC: 18.8 MG/DL
WBC # BLD AUTO: 8.64 10*3/MM3 (ref 3.4–10.8)
WBC #/AREA URNS HPF: NORMAL /HPF (ref 0–5)

## 2020-01-16 ENCOUNTER — OFFICE VISIT (OUTPATIENT)
Dept: CARDIOLOGY | Facility: CLINIC | Age: 75
End: 2020-01-16

## 2020-01-16 ENCOUNTER — CLINICAL SUPPORT NO REQUIREMENTS (OUTPATIENT)
Dept: CARDIOLOGY | Facility: CLINIC | Age: 75
End: 2020-01-16

## 2020-01-16 VITALS
WEIGHT: 255 LBS | BODY MASS INDEX: 40.02 KG/M2 | DIASTOLIC BLOOD PRESSURE: 78 MMHG | SYSTOLIC BLOOD PRESSURE: 132 MMHG | HEIGHT: 67 IN | HEART RATE: 72 BPM

## 2020-01-16 DIAGNOSIS — I44.2 COMPLETE HEART BLOCK (HCC): Primary | ICD-10-CM

## 2020-01-16 DIAGNOSIS — M79.89 LEG SWELLING: ICD-10-CM

## 2020-01-16 DIAGNOSIS — I44.2 ATRIOVENTRICULAR BLOCK, COMPLETE (HCC): Primary | ICD-10-CM

## 2020-01-16 DIAGNOSIS — R09.89 LABILE BLOOD PRESSURE: ICD-10-CM

## 2020-01-16 DIAGNOSIS — I47.29 NSVT (NONSUSTAINED VENTRICULAR TACHYCARDIA) (HCC): ICD-10-CM

## 2020-01-16 DIAGNOSIS — Z95.0 HISTORY OF PACEMAKER: ICD-10-CM

## 2020-01-16 PROCEDURE — 93280 PM DEVICE PROGR EVAL DUAL: CPT | Performed by: INTERNAL MEDICINE

## 2020-01-16 PROCEDURE — 93000 ELECTROCARDIOGRAM COMPLETE: CPT | Performed by: INTERNAL MEDICINE

## 2020-01-16 PROCEDURE — 99213 OFFICE O/P EST LOW 20 MIN: CPT | Performed by: INTERNAL MEDICINE

## 2020-01-16 RX ORDER — MONTELUKAST SODIUM 10 MG/1
10 TABLET ORAL NIGHTLY
COMMUNITY

## 2020-01-16 NOTE — PROGRESS NOTES
Date of Office Visit: 2020  Encounter Provider: Jono Madden MD  Place of Service: [facility] CARDIOLOGY  Patient Name: Javier San  :1945    Chief Complaint   Patient presents with   • Follow-up   :     HPI: Javier San is a 74 y.o. male who presents today to follow up. He has a history of near syncope and labile hypertension with orthostasis.  In 2016, he presented to the ER with bradycardia/Mobitz II heart block, and had a dual chamber [employer] pacemaker placed. He has not had syncope, although he does still have intermittent lightheadedness (extremely brief, seconds long episodes). He also has very labile blood pressures, which can be high or low. Unfortunately, he has Parkinsonism and small fiber neuropathy.    In 2019, an echo revealed normal LVSF, wall motion abnormalities consistent with RV pacing, and grade IA diastolic dysfunction with normal RVSP.    He has chronic leg swelling.  He denies orthopnea or PND.  He is chronically short of breath, but his mobility is extremely limited from musculoskeletal causes.  He had an episode of palpitations in October; this corresponded to a 10-beat NSVT.  He has not had any other events according to device interrogation.     Past Medical History:   Diagnosis Date   • Abnormal blood chemistry    • Abnormal liver enzymes    • Acute bacterial prostatitis    • Anemia    • Anxiety 2014    Warning,Lainey   • Arthritis    • Asthma 3/1/2016   • Basal cell carcinoma    • Chronic bronchitis (CMS/HCC) 3/1/2016   • Gynecomastia    • H/O degenerative disc disease    • H/O hernia repair    • Hypertension 3/1/2016    Impression: 2015 - .  Impression: 2014 - Controlled with HCTZ.;    • Hypokalemia    • Hypothyroidism    • Labile hypertension 2016    with periods of orthostasis and near syncope   • Left bundle branch block    • Low back pain    • Lumbar radiculopathy    • Lumbar  stenosis    • Lung nodule     CT of chest 2/9/15   • Malignant neoplasm of cheek (CMS/HCC) 3/1/2016    Description: - Basal Cell   • Mobitz (type) II atrioventricular block     s/p [employer] dual chamber PPM    • Obstructive sleep apnea    • Parkinson disease (CMS/HCC)    • Rupture of kidney     in high school football   • Skin cancer of nose     Basal Cell   • Stricture of ureter     WITH HYDRONEPHROSIS   • Testicular pain    • Venous insufficiency    • Vitamin D deficiency        Past Surgical History:   Procedure Laterality Date   • BACK SURGERY      L4-L5 with Dr. Solomon   • CARDIAC ELECTROPHYSIOLOGY PROCEDURE N/A 2016    Procedure: Pacemaker DC new  [place];  Surgeon: Zachary Narayanan MD;  Location:  INVASIVE LOCATION;  Service:    • COLON SURGERY      Complete colonoscopy- Dr. knox/[facility]   • CYSTOSCOPY TRANSURETHRAL RESECTION OF PROSTATE     • CYSTOSCOPY TRANSURETHRAL RESECTION OF PROSTATE N/A 2016    Procedure: CYSTOSCOPY TRANSURETHRAL RESECTION OF PROSTATE;  Surgeon: Law Dee MD;  Location: [facility] OR;  Service:    • HERNIA REPAIR     • NASAL SEPTUM SURGERY     • NOSE SURGERY      deviated septum   • PROSTATE SURGERY      transurethral destruction prostate tissue   • REPLACEMENT TOTAL KNEE BILATERAL      right in  and left in    • TONSILLECTOMY     • TOTAL SHOULDER REPLACEMENT Bilateral     left in  and right in        Social History     Socioeconomic History   • Marital status:      Spouse name: Not on file   • Number of children: Not on file   • Years of education: Not on file   • Highest education level: Not on file   Tobacco Use   • Smoking status: Former Smoker     Packs/day: 1.00     Types: Cigarettes     Start date: 1964     Last attempt to quit: 1984     Years since quittin.6   • Smokeless tobacco: Never Used   Substance and Sexual Activity   • Alcohol use: Yes     Comment: No caffeine use   • Drug use: No   • Sexual activity:  Defer       Family History   Problem Relation Age of Onset   • Alcohol abuse Mother    • Cancer Mother         skin   • Stroke Mother    • Alcohol abuse Father    • Emphysema Father    • COPD Father    • Cancer Father         skin       Review of Systems   Constitution: Positive for weight gain.   Cardiovascular: Positive for leg swelling. Negative for chest pain and palpitations.   Respiratory: Positive for shortness of breath.    Musculoskeletal: Positive for back pain.   Neurological: Positive for loss of balance, numbness, paresthesias and tremors.        Nerve pain   All other systems reviewed and are negative.      Allergies   Allergen Reactions   • Levofloxacin Myalgia         Current Outpatient Medications:   •  acetaminophen (TYLENOL) 325 MG tablet, Take 650 mg by mouth Every 6 (Six) Hours As Needed for mild pain (1-3)., Disp: , Rfl:   •  albuterol (ACCUNEB) 1.25 MG/3ML nebulizer solution, Take 1 ampule by nebulization Every 6 (Six) Hours As Needed for wheezing., Disp: , Rfl:   •  albuterol (PROVENTIL HFA;VENTOLIN HFA) 108 (90 BASE) MCG/ACT inhaler, Inhale 2 puffs Every 6 (Six) Hours As Needed. ProAir  (90 Base) MCG/ACT Inhalation Aerosol Solution; Patient Sig: ProAir  (90 Base) MCG/ACT Inhalation Aerosol Solution INHALE 1 TO 2 PUFFS EVERY 4 TO 6 HOURS AS NEEDED.; 0; 29-Sep-2014; Active, Disp: , Rfl:   •  amLODIPine (NORVASC) 5 MG tablet, TAKE 1 TABLET BY MOUTH DAILY, Disp: 30 tablet, Rfl: 11  •  aspirin 81 MG EC tablet, Take 81 mg by mouth Daily., Disp: , Rfl:   •  atorvastatin (LIPITOR) 10 MG tablet, TAKE 1 TABLET BY MOUTH EVERY DAY, Disp: 90 tablet, Rfl: 0  •  azelastine (ASTELIN) 0.1 % nasal spray, 1 spray into each nostril 2 (Two) Times a Day. Use 1 to 2 sprays, Disp: , Rfl:   •  carbidopa-levodopa (SINEMET)  MG per tablet, TK 1 T PO four time daily, Disp: , Rfl: 2  •  Cholecalciferol (VITAMIN D) 2000 units tablet, Take 2,000 Units by mouth Daily., Disp: , Rfl:   •  Cyanocobalamin  "(B-12 PO), Take  by mouth., Disp: , Rfl:   •  DULoxetine (CYMBALTA) 30 MG capsule, Take 60 mg by mouth 2 (Two) Times a Day., Disp: , Rfl:   •  Fluticasone Furoate-Vilanterol 100-25 MCG/INH aerosol powder , Breo Ellipta 100 mcg-25 mcg/dose powder for inhalation, Disp: , Rfl:   •  furosemide (LASIX) 20 MG tablet, Take 2 tablets by mouth Daily., Disp: 60 tablet, Rfl: 5  •  gabapentin (NEURONTIN) 300 MG capsule, TAKE 2 CAPSULES BY MOUTH THREE TIMES DAILY, Disp: 180 capsule, Rfl: 0  •  HYDROcodone-acetaminophen (NORCO) 7.5-325 MG per tablet, TK 1 T PO Q DAY PRN, Disp: , Rfl: 0  •  levothyroxine (SYNTHROID, LEVOTHROID) 137 MCG tablet, TAKE 1 TABLET BY MOUTH EVERY DAY, Disp: 90 tablet, Rfl: 0  •  Loratadine 10 MG capsule, Take 10 mg by mouth Daily., Disp: , Rfl:   •  losartan (COZAAR) 50 MG tablet, TAKE 1 TABLET BY MOUTH EVERY DAY (Patient taking differently: Take 25 mg by mouth Daily.), Disp: 90 tablet, Rfl: 0  •  montelukast (SINGULAIR) 10 MG tablet, Take 10 mg by mouth Every Night., Disp: , Rfl:   •  PERFOROMIST 20 MCG/2ML nebulizer solution, INHALE 1 VIAL VIA NEB BID, Disp: , Rfl: 11  •  sildenafil (VIAGRA) 25 MG tablet, Take 25 mg by mouth daily as needed for erectile dysfunction., Disp: , Rfl:   •  vitamin C (ASCORBIC ACID) 250 MG tablet, Take 250 mg by mouth daily., Disp: , Rfl:      Objective:     Vitals:    01/16/20 0933 01/16/20 0943   BP: 126/74 132/78   BP Location: Left arm Right arm   Patient Position: Sitting Sitting   Pulse: 72    Weight: 116 kg (255 lb)    Height: 170.2 cm (67\")      Body mass index is 39.94 kg/m².    Physical Exam   Constitutional: He is oriented to person, place, and time.   Obese   HENT:   Head: Normocephalic.   Nose: Nose normal.   Mouth/Throat: Oropharynx is clear and moist.   Eyes: Pupils are equal, round, and reactive to light. Conjunctivae and EOM are normal.   Neck: Normal range of motion. No JVD present.   Cardiovascular: Normal rate, regular rhythm, normal heart sounds and " intact distal pulses.   No murmur heard.  Pulmonary/Chest: Effort normal and breath sounds normal.   Abdominal: Soft. There is no tenderness.   Obesity limits abdominal exam   Musculoskeletal: Normal range of motion. He exhibits edema (1-2+).   Neurological: He is alert and oriented to person, place, and time. He displays tremor. No cranial nerve deficit.   Skin: Skin is warm and dry. No rash noted.   Psychiatric: He has a normal mood and affect. His behavior is normal. Judgment and thought content normal.   Vitals reviewed.        ECG 12 Lead  Date/Time: 1/16/2020 2:50 PM  Performed by: Jono Madden MD  Authorized by: Jono Madden MD   Comparison: compared with previous ECG   Similar to previous ECG  Rhythm: sinus rhythm  Rhythm comments: V-paced  Other: no other findings    Clinical impression: abnormal EKG              Assessment:       Diagnosis Plan   1. Atrioventricular block, complete (CMS/HCC)     2. History of pacemaker     3. NSVT (nonsustained ventricular tachycardia) (CMS/HCC)     4. Labile blood pressure     5. Leg swelling            Plan:       1/2.  He has a history of high grade AVB and is now s/p dual chamber PPM placement.  He paces in the % of the time.  He is doing well.    3.  He had one 10-beat NSVT in October.  There have been no other events at al.  An echo in January 2019 showed normal LVSF.  I do not feel he needs an ischemic evaluation.    4.  He has a history of labile hypertension.  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  His BP is generally within goal today.  He hasn't had syncope so we'll keep things where they are for now.    5.  I think this is multifactorial -- neuropathy, obesity, venous insufficiency, amlodipine.  He's on low dose furosemide.  Given his BP lability, I would not be more aggressive with this.     Sincerely,       Jono Madden MD

## 2020-01-17 ENCOUNTER — OFFICE VISIT (OUTPATIENT)
Dept: INTERNAL MEDICINE | Facility: CLINIC | Age: 75
End: 2020-01-17

## 2020-01-17 VITALS
HEART RATE: 74 BPM | OXYGEN SATURATION: 98 % | DIASTOLIC BLOOD PRESSURE: 80 MMHG | BODY MASS INDEX: 40.02 KG/M2 | HEIGHT: 67 IN | SYSTOLIC BLOOD PRESSURE: 116 MMHG | WEIGHT: 255 LBS

## 2020-01-17 DIAGNOSIS — I10 ESSENTIAL (PRIMARY) HYPERTENSION: ICD-10-CM

## 2020-01-17 DIAGNOSIS — G90.1 DYSAUTONOMIA (HCC): ICD-10-CM

## 2020-01-17 DIAGNOSIS — G20 PARKINSON DISEASE (HCC): ICD-10-CM

## 2020-01-17 DIAGNOSIS — E03.9 ACQUIRED HYPOTHYROIDISM: ICD-10-CM

## 2020-01-17 DIAGNOSIS — E78.00 PURE HYPERCHOLESTEROLEMIA: ICD-10-CM

## 2020-01-17 DIAGNOSIS — Z00.00 MEDICARE ANNUAL WELLNESS VISIT, SUBSEQUENT: Primary | ICD-10-CM

## 2020-01-17 DIAGNOSIS — E66.01 CLASS 2 SEVERE OBESITY DUE TO EXCESS CALORIES WITH SERIOUS COMORBIDITY AND BODY MASS INDEX (BMI) OF 39.0 TO 39.9 IN ADULT (HCC): ICD-10-CM

## 2020-01-17 PROBLEM — G62.9 SMALL FIBER NEUROPATHY: Status: ACTIVE | Noted: 2019-04-10

## 2020-01-17 PROCEDURE — 99214 OFFICE O/P EST MOD 30 MIN: CPT | Performed by: INTERNAL MEDICINE

## 2020-01-17 PROCEDURE — G0439 PPPS, SUBSEQ VISIT: HCPCS | Performed by: INTERNAL MEDICINE

## 2020-01-17 NOTE — PROGRESS NOTES
Subjective     Javier San is a 74 y.o. male who presents for an annual wellness visit as well as check up of htn, hld, hypothyroidism.        History of Present Illness     HTN.  Fair control with current.   HLD. Excellent control on atorvastatin.    Hypthyroidism.  Under good control on levothyroxine.   Dr. Ford follows his parkinson's disease which is stable. He has associated dysautonomia.      Review of Systems   Respiratory: Positive for cough.    Cardiovascular: Negative.        The following portions of the patient's history were reviewed and updated as appropriate: allergies, current medications, past family history, past medical history, past social history, past surgical history and problem list.  Health maintenance tab was reviewed and updated with the patient.       Patient Active Problem List    Diagnosis Date Noted   • Essential (primary) hypertension 01/17/2020   • Dysautonomia (CMS/[facility]) 01/17/2020   • Labile blood pressure 01/16/2020   • Small fiber neuropathy 04/10/2019   • Raynaud's disease without gangrene 04/10/2019   • Atrioventricular block, complete (CMS/[facility]) 03/01/2019   • Class 2 severe obesity due to excess calories with serious comorbidity and body mass index (BMI) of 39.0 to 39.9 in adult (CMS/[facility]) 11/05/2018   • History of pacemaker 04/16/2018   • Parkinson disease (CMS/[facility]) 10/30/2017   • Benign prostatic hyperplasia with lower urinary tract symptoms 12/07/2016   • Hyperlipemia 10/27/2016   • Thyroid nodule 10/26/2016     Note Last Updated: 10/31/2017     Noted on CT chest 3/2016.  Stable 9/2016.  Followed by ENT.       • History of melanoma 04/15/2016     Note Last Updated: 4/15/2016     Times two.       • Atopic rhinitis 03/01/2016   • Arthritis 03/01/2016   • Asthma 03/01/2016   • Degeneration of intervertebral disc of lumbar region 03/01/2016   • Hypothyroidism 03/01/2016   • Impaired fasting glucose 03/01/2016   • Lumbar radiculopathy 03/01/2016   • Spinal stenosis of  lumbar region 03/01/2016   • Lung nodule 03/01/2016     Note Last Updated: 10/31/2017     Follow over two years by Dr. Garnica.      • Obstructive sleep apnea syndrome 03/01/2016     Note Last Updated: 3/1/2016     Description: Patient is maintained on BIPAP.         Past Medical History:   Diagnosis Date   • Abnormal blood chemistry    • Abnormal liver enzymes    • Acute bacterial prostatitis    • Anemia    • Anxiety 03/24/2014    Warning,Lainey   • Arthritis    • Asthma 3/1/2016   • Basal cell carcinoma    • Chronic bronchitis (CMS/HCC) 3/1/2016   • Gynecomastia    • H/O degenerative disc disease    • H/O hernia repair    • Hypertension 3/1/2016    Impression: 04/08/2015 - .  Impression: 03/24/2014 - Controlled with HCTZ.;    • Hypokalemia    • Hypothyroidism    • Labile hypertension 03/01/2016    with periods of orthostasis and near syncope   • Left bundle branch block    • Low back pain    • Lumbar radiculopathy    • Lumbar stenosis    • Lung nodule     CT of chest 2/9/15   • Malignant neoplasm of cheek (CMS/HCC) 3/1/2016    Description: - Basal Cell   • Mobitz (type) II atrioventricular block     s/p [employer]i dual chamber PPM 2016   • Obstructive sleep apnea    • Parkinson disease (CMS/HCC)    • Rupture of kidney     in high school football   • Skin cancer of nose     Basal Cell   • Stricture of ureter     WITH HYDRONEPHROSIS   • Testicular pain    • Venous insufficiency    • Vitamin D deficiency        Past Surgical History:   Procedure Laterality Date   • BACK SURGERY      L4-L5 with Dr. Solomon   • CARDIAC ELECTROPHYSIOLOGY PROCEDURE N/A 6/17/2016    Procedure: Pacemaker DC new  [place];  Surgeon: Zachary Narayanan MD;  Location: [facility] INVASIVE LOCATION;  Service:    • COLON SURGERY      Complete colonoscopy- Dr. knox/DAVID   • CYSTOSCOPY TRANSURETHRAL RESECTION OF PROSTATE     • CYSTOSCOPY TRANSURETHRAL RESECTION OF PROSTATE N/A 12/7/2016    Procedure: CYSTOSCOPY TRANSURETHRAL RESECTION OF PROSTATE;   Surgeon: Law Dee MD;  Location: [facility] OR;  Service:    • HERNIA REPAIR     • NASAL SEPTUM SURGERY     • NOSE SURGERY      deviated septum   • PROSTATE SURGERY      transurethral destruction prostate tissue   • REPLACEMENT TOTAL KNEE BILATERAL      right in  and left in    • TONSILLECTOMY     • TOTAL SHOULDER REPLACEMENT Bilateral     left in  and right in        Family History   Problem Relation Age of Onset   • Alcohol abuse Mother    • Cancer Mother         skin   • Stroke Mother    • Alcohol abuse Father    • Emphysema Father    • COPD Father    • Cancer Father         skin       Social History     Socioeconomic History   • Marital status:      Spouse name: Not on file   • Number of children: Not on file   • Years of education: Not on file   • Highest education level: Not on file   Tobacco Use   • Smoking status: Former Smoker     Packs/day: 1.00     Types: Cigarettes     Start date: 1964     Last attempt to quit: 1984     Years since quittin.6   • Smokeless tobacco: Never Used   Substance and Sexual Activity   • Alcohol use: Yes     Comment: No caffeine use   • Drug use: No   • Sexual activity: Defer       Current Outpatient Medications on File Prior to Visit   Medication Sig Dispense Refill   • acetaminophen (TYLENOL) 325 MG tablet Take 650 mg by mouth Every 6 (Six) Hours As Needed for mild pain (1-3).     • albuterol (ACCUNEB) 1.25 MG/3ML nebulizer solution Take 1 ampule by nebulization Every 6 (Six) Hours As Needed for wheezing.     • albuterol (PROVENTIL HFA;VENTOLIN HFA) 108 (90 BASE) MCG/ACT inhaler Inhale 2 puffs Every 6 (Six) Hours As Needed. ProAir  (90 Base) MCG/ACT Inhalation Aerosol Solution; Patient Sig: ProAir  (90 Base) MCG/ACT Inhalation Aerosol Solution INHALE 1 TO 2 PUFFS EVERY 4 TO 6 HOURS AS NEEDED.; 0; -Sep-2014; Active     • amLODIPine (NORVASC) 5 MG tablet TAKE 1 TABLET BY MOUTH DAILY 30 tablet 11   • atorvastatin  (LIPITOR) 10 MG tablet TAKE 1 TABLET BY MOUTH EVERY DAY 90 tablet 0   • azelastine (ASTELIN) 0.1 % nasal spray 1 spray into each nostril 2 (Two) Times a Day. Use 1 to 2 sprays     • carbidopa-levodopa (SINEMET)  MG per tablet TK 1 T PO four time daily  2   • Cholecalciferol (VITAMIN D) 2000 units tablet Take 2,000 Units by mouth Daily.     • Cyanocobalamin (B-12 PO) Take  by mouth.     • DULoxetine (CYMBALTA) 30 MG capsule Take 60 mg by mouth 2 (Two) Times a Day.     • Fluticasone Furoate-Vilanterol 100-25 MCG/INH aerosol powder  Breo Ellipta 100 mcg-25 mcg/dose powder for inhalation     • furosemide (LASIX) 20 MG tablet Take 2 tablets by mouth Daily. 60 tablet 5   • gabapentin (NEURONTIN) 300 MG capsule TAKE 2 CAPSULES BY MOUTH THREE TIMES DAILY 180 capsule 0   • HYDROcodone-acetaminophen (NORCO) 7.5-325 MG per tablet TK 1 T PO Q DAY PRN  0   • levothyroxine (SYNTHROID, LEVOTHROID) 137 MCG tablet TAKE 1 TABLET BY MOUTH EVERY DAY 90 tablet 0   • Loratadine 10 MG capsule Take 10 mg by mouth Daily.     • losartan (COZAAR) 50 MG tablet TAKE 1 TABLET BY MOUTH EVERY DAY (Patient taking differently: Take 25 mg by mouth Daily.) 90 tablet 0   • montelukast (SINGULAIR) 10 MG tablet Take 10 mg by mouth Every Night.     • sildenafil (VIAGRA) 25 MG tablet Take 25 mg by mouth daily as needed for erectile dysfunction.     • vitamin C (ASCORBIC ACID) 250 MG tablet Take 250 mg by mouth daily.     • [DISCONTINUED] aspirin 81 MG EC tablet Take 81 mg by mouth Daily.     • [DISCONTINUED] PERFOROMIST 20 MCG/2ML nebulizer solution INHALE 1 VIAL VIA NEB BID  11     No current facility-administered medications on file prior to visit.        Allergies   Allergen Reactions   • Levofloxacin Myalgia       Immunization History   Administered Date(s) Administered   • FLUAD TRI 65YR+ 09/01/2016, 09/01/2017, 11/01/2018, 10/01/2019   • Flu Mist 10/21/2015   • Fluzone High Dose =>65 Years ([employer] ONLY) 09/26/2017, 10/01/2019   • Hep A,  "Unspecified 10/30/1998   • Hepatitis A 05/18/1999   • Influenza, Unspecified 11/05/2018   • Pneumococcal Conjugate 13-Valent (PCV13) 01/01/2015, 10/21/2015   • Pneumococcal Polysaccharide (PPSV23) 10/01/2015   • Td 03/05/1997   • Tdap 06/23/2013   • Zostavax 02/05/2010       Objective     /80   Pulse 74   Ht 170.2 cm (67.01\")   Wt 116 kg (255 lb)   SpO2 98%   BMI 39.93 kg/m²     Physical Exam   Constitutional: He is oriented to person, place, and time. He appears well-developed and well-nourished.   HENT:   Head: Normocephalic and atraumatic.   Right Ear: Hearing and tympanic membrane normal.   Left Ear: Hearing and tympanic membrane normal.   Mouth/Throat: No posterior oropharyngeal erythema.   Neck: Neck supple. No thyromegaly present.   Cardiovascular: Normal rate, regular rhythm and normal heart sounds.   No murmur heard.  Pulmonary/Chest: Effort normal and breath sounds normal.   Abdominal: Soft. He exhibits no distension. There is no hepatosplenomegaly. There is no tenderness.   Lymphadenopathy:     He has no cervical adenopathy.   Neurological: He is alert and oriented to person, place, and time.   Skin: Skin is warm and dry.   Psychiatric: He has a normal mood and affect. His speech is normal and behavior is normal. Judgment and thought content normal. Cognition and memory are normal.       Assessment/Plan   Javier was seen today for medicare wellness-subsequent.    Diagnoses and all orders for this visit:    Medicare annual wellness visit, subsequent    Essential (primary) hypertension    Pure hypercholesterolemia    Acquired hypothyroidism    Class 2 severe obesity due to excess calories with serious comorbidity and body mass index (BMI) of 39.0 to 39.9 in adult (CMS/HCC)    Dysautonomia (CMS/HCC)    Parkinson disease (CMS/[facility])        Discussion    AWV.  See scanned forms and/or computer for antolin history, PHQ-9, functional ability questionnaire, cognitive impairment screening.  Direct " observation of cognitive abilities:  The patient does not exhibit any impairment in cognitive abilities upon direct observation at today's visit.   These were all reviewed with the patient and the patient was provided with a personal prevention plan of service in patient instructions.  Patient was given advice or handouts on the following topics:  nutrition, exercise, weight management.   Patient has an advance directive - a copy has not been provided. Have asked the patient to send this to us to add to record.  Htn.  Continue current regimen.  HLD.  Continue atorvastatin.   Hypothyroidism.  Continue levothyroxine at current dose.   Obesity.  We discussed weight loss.    Parkinson's disease with dysautonomia.  Continue with neurology.        Health Maintenance   Topic Date Due   • ZOSTER VACCINE (2 of 3) 04/02/2010   • MEDICARE ANNUAL WELLNESS  11/05/2019   • LIPID PANEL  01/14/2021   • COLONOSCOPY  05/06/2023   • TDAP/TD VACCINES (2 - Td) 06/23/2023   • Pneumococcal Vaccine Once at 65 Years Old  Completed   • INFLUENZA VACCINE  Completed   • AAA SCREEN (ONE-TIME)  Completed   • HEPATITIS C SCREENING  Addressed            Future Appointments   Date Time Provider Department Center   4/23/2020 12:00 AM JARRELL VALLEJO [facility] None   7/10/2020 10:00 AM [facility] None   7/16/2020 11:00 AM Sindy Hernández APRN [facility] None   7/17/2020  2:15 PM Lainey Gaffney MD [facility] None   1/15/2021  9:40 AM [facility] None   1/22/2021  1:30 PM Lainey Gaffney MD [facility] None

## 2020-01-20 RX ORDER — ATORVASTATIN CALCIUM 10 MG/1
TABLET, FILM COATED ORAL
Qty: 90 TABLET | Refills: 0 | Status: SHIPPED | OUTPATIENT
Start: 2020-01-20 | End: 2020-04-10

## 2020-02-04 RX ORDER — LOSARTAN POTASSIUM 50 MG/1
TABLET ORAL
Qty: 90 TABLET | Refills: 0 | Status: SHIPPED | OUTPATIENT
Start: 2020-02-04 | End: 2020-09-07

## 2020-02-10 RX ORDER — FUROSEMIDE 20 MG/1
40 TABLET ORAL DAILY
Qty: 60 TABLET | Refills: 5 | Status: SHIPPED | OUTPATIENT
Start: 2020-02-10 | End: 2020-06-10

## 2020-03-20 RX ORDER — CEPHALEXIN 500 MG/1
500 CAPSULE ORAL 2 TIMES DAILY
Qty: 14 CAPSULE | Refills: 0 | Status: SHIPPED | OUTPATIENT
Start: 2020-03-20 | End: 2020-04-29

## 2020-04-10 RX ORDER — LEVOTHYROXINE SODIUM 137 UG/1
TABLET ORAL
Qty: 90 TABLET | Refills: 0 | Status: SHIPPED | OUTPATIENT
Start: 2020-04-10 | End: 2020-07-01

## 2020-04-10 RX ORDER — ATORVASTATIN CALCIUM 10 MG/1
TABLET, FILM COATED ORAL
Qty: 90 TABLET | Refills: 1 | Status: SHIPPED | OUTPATIENT
Start: 2020-04-10 | End: 2020-07-13

## 2020-04-23 ENCOUNTER — CLINICAL SUPPORT NO REQUIREMENTS (OUTPATIENT)
Dept: CARDIOLOGY | Facility: CLINIC | Age: 75
End: 2020-04-23

## 2020-04-23 DIAGNOSIS — I44.2 THIRD DEGREE AV BLOCK (HCC): Primary | ICD-10-CM

## 2020-04-23 PROCEDURE — 93296 REM INTERROG EVL PM/IDS: CPT | Performed by: INTERNAL MEDICINE

## 2020-04-23 PROCEDURE — 93294 REM INTERROG EVL PM/LDLS PM: CPT | Performed by: INTERNAL MEDICINE

## 2020-04-29 ENCOUNTER — OFFICE VISIT (OUTPATIENT)
Dept: INTERNAL MEDICINE | Facility: CLINIC | Age: 75
End: 2020-04-29

## 2020-04-29 DIAGNOSIS — I10 ESSENTIAL (PRIMARY) HYPERTENSION: Primary | ICD-10-CM

## 2020-04-29 DIAGNOSIS — R51.9 NONINTRACTABLE HEADACHE, UNSPECIFIED CHRONICITY PATTERN, UNSPECIFIED HEADACHE TYPE: ICD-10-CM

## 2020-04-29 PROBLEM — M79.2 PERIPHERAL NEUROPATHIC PAIN: Status: ACTIVE | Noted: 2020-01-30

## 2020-04-29 PROBLEM — M47.816 LUMBAR SPONDYLOSIS: Status: ACTIVE | Noted: 2020-01-30

## 2020-04-29 PROCEDURE — 99442 PR PHYS/QHP TELEPHONE EVALUATION 11-20 MIN: CPT | Performed by: INTERNAL MEDICINE

## 2020-04-29 NOTE — PROGRESS NOTES
Subjective     Javier San is a 75 y.o. male who presents with   Chief Complaint   Patient presents with   • Headache   • Hypertension       History of Present Illness     You have chosen to receive care through a telephone visit. Do you consent to use a telephone visit for your medical care today? Yes.  Length of visit was twelve minutes.      C/o HA that is intermittent.  Feels like a vice around his head.  He has seen his neurologist for this HA.  He thought it could be BP related since his BP is increased when he gets the HA.  Going on several weeks.     Review of Systems   Respiratory: Negative.    Cardiovascular: Negative.        The following portions of the patient's history were reviewed and updated as appropriate: allergies, current medications and problem list.    Patient Active Problem List    Diagnosis Date Noted   • Peripheral neuropathic pain 01/30/2020   • Lumbar spondylosis 01/30/2020   • Essential (primary) hypertension 01/17/2020   • Dysautonomia (CMS/[facility]) 01/17/2020   • Labile blood pressure 01/16/2020   • Small fiber neuropathy 04/10/2019   • Raynaud's disease without gangrene 04/10/2019   • Atrioventricular block, complete (CMS/[facility]) 03/01/2019   • Class 2 severe obesity due to excess calories with serious comorbidity and body mass index (BMI) of 39.0 to 39.9 in adult (CMS/[facility]) 11/05/2018   • History of pacemaker 04/16/2018   • Parkinson disease (CMS/[facility]) 10/30/2017   • Benign prostatic hyperplasia with lower urinary tract symptoms 12/07/2016   • Hyperlipemia 10/27/2016   • Thyroid nodule 10/26/2016     Note Last Updated: 10/31/2017     Noted on CT chest 3/2016.  Stable 9/2016.  Followed by ENT.       • History of melanoma 04/15/2016     Note Last Updated: 4/15/2016     Times two.       • Atopic rhinitis 03/01/2016   • Arthritis 03/01/2016   • Asthma 03/01/2016   • Degeneration of intervertebral disc of lumbar region 03/01/2016   • Hypothyroidism 03/01/2016   • Impaired fasting glucose  03/01/2016   • Lumbar radiculopathy 03/01/2016   • Spinal stenosis of lumbar region 03/01/2016   • Lung nodule 03/01/2016     Note Last Updated: 10/31/2017     Follow over two years by Dr. Garnica.      • Obstructive sleep apnea syndrome 03/01/2016     Note Last Updated: 3/1/2016     Description: Patient is maintained on BIPAP.         Current Outpatient Medications on File Prior to Visit   Medication Sig Dispense Refill   • acetaminophen (TYLENOL) 325 MG tablet Take 650 mg by mouth Every 6 (Six) Hours As Needed for mild pain (1-3).     • albuterol (ACCUNEB) 1.25 MG/3ML nebulizer solution Take 1 ampule by nebulization Every 6 (Six) Hours As Needed for wheezing.     • albuterol (PROVENTIL HFA;VENTOLIN HFA) 108 (90 BASE) MCG/ACT inhaler Inhale 2 puffs Every 6 (Six) Hours As Needed. ProAir  (90 Base) MCG/ACT Inhalation Aerosol Solution; Patient Sig: ProAir  (90 Base) MCG/ACT Inhalation Aerosol Solution INHALE 1 TO 2 PUFFS EVERY 4 TO 6 HOURS AS NEEDED.; 0; 29-Sep-2014; Active     • atorvastatin (LIPITOR) 10 MG tablet TAKE 1 TABLET BY MOUTH EVERY DAY 90 tablet 1   • azelastine (ASTELIN) 0.1 % nasal spray 1 spray into each nostril 2 (Two) Times a Day. Use 1 to 2 sprays     • carbidopa-levodopa (SINEMET)  MG per tablet TK 1 T PO four time daily  2   • Cholecalciferol (VITAMIN D) 2000 units tablet Take 2,000 Units by mouth Daily.     • Cyanocobalamin (B-12 PO) Take  by mouth.     • DULoxetine (CYMBALTA) 30 MG capsule Take 60 mg by mouth 2 (Two) Times a Day.     • Fluticasone Furoate-Vilanterol 100-25 MCG/INH aerosol powder  Breo Ellipta 100 mcg-25 mcg/dose powder for inhalation     • furosemide (LASIX) 20 MG tablet TAKE 2 TABLETS BY MOUTH DAILY 60 tablet 5   • gabapentin (NEURONTIN) 300 MG capsule TAKE 2 CAPSULES BY MOUTH THREE TIMES DAILY 180 capsule 0   • HYDROcodone-acetaminophen (NORCO) 7.5-325 MG per tablet TK 1 T PO Q DAY PRN  0   • levothyroxine (SYNTHROID, LEVOTHROID) 137 MCG tablet TAKE 1 TABLET BY  MOUTH EVERY DAY 90 tablet 0   • Loratadine 10 MG capsule Take 10 mg by mouth Daily.     • losartan (COZAAR) 50 MG tablet TAKE 1 TABLET BY MOUTH EVERY DAY 90 tablet 0   • montelukast (SINGULAIR) 10 MG tablet Take 10 mg by mouth Every Night.     • sildenafil (VIAGRA) 25 MG tablet Take 25 mg by mouth daily as needed for erectile dysfunction.     • vitamin C (ASCORBIC ACID) 250 MG tablet Take 250 mg by mouth daily.     • [DISCONTINUED] amLODIPine (NORVASC) 5 MG tablet TAKE 1 TABLET BY MOUTH DAILY 30 tablet 11   • [DISCONTINUED] cephalexin (Keflex) 500 MG capsule Take 1 capsule by mouth 2 (Two) Times a Day. 14 capsule 0     No current facility-administered medications on file prior to visit.        Objective     There were no vitals taken for this visit.    Physical Exam   Pulmonary/Chest: Effort normal.   Psychiatric: He has a normal mood and affect. His behavior is normal.       Assessment/Plan   Javier was seen today for headache and hypertension.    Diagnoses and all orders for this visit:    Essential (primary) hypertension    Nonintractable headache, unspecified chronicity pattern, unspecified headache type        Discussion    Patient presents by phone with intermittent headache likely secondary to poorly controlled hypertension.  He is going to increase losartan to 50 mg daily.  He had only been taking 1/2.  The patient is instructed to monitor at home and call in checks.             Future Appointments   Date Time Provider Department Center   7/10/2020 10:00 AM [facility] None   7/16/2020 10:30 AM [facility] None   7/16/2020 11:00 AM Sindy Hernández APRN [facility] None   7/17/2020  2:15 PM Lainey Gaffney MD [facility] None   1/15/2021  9:40 AM [facility] None   1/22/2021  1:30 PM Lainey Gaffney MD [facility] None

## 2020-05-21 ENCOUNTER — TELEPHONE (OUTPATIENT)
Dept: CARDIOLOGY | Facility: CLINIC | Age: 75
End: 2020-05-21

## 2020-05-21 NOTE — TELEPHONE ENCOUNTER
Pt is scheduled for surgery on L2-3 DLIF under general Dr. Donahue.      Dr. Madden has signed the form:   Cardiac Risk low    Faxed form to [phone]

## 2020-06-10 ENCOUNTER — TELEPHONE (OUTPATIENT)
Dept: INTERNAL MEDICINE | Facility: CLINIC | Age: 75
End: 2020-06-10

## 2020-06-10 ENCOUNTER — OFFICE VISIT (OUTPATIENT)
Dept: INTERNAL MEDICINE | Facility: CLINIC | Age: 75
End: 2020-06-10

## 2020-06-10 VITALS
SYSTOLIC BLOOD PRESSURE: 147 MMHG | HEART RATE: 78 BPM | TEMPERATURE: 98.6 F | WEIGHT: 263 LBS | DIASTOLIC BLOOD PRESSURE: 68 MMHG | HEIGHT: 67 IN | BODY MASS INDEX: 41.28 KG/M2 | RESPIRATION RATE: 20 BRPM

## 2020-06-10 DIAGNOSIS — R60.0 BILATERAL LEG EDEMA: Primary | ICD-10-CM

## 2020-06-10 DIAGNOSIS — R05.9 COUGH: ICD-10-CM

## 2020-06-10 PROCEDURE — 99214 OFFICE O/P EST MOD 30 MIN: CPT | Performed by: INTERNAL MEDICINE

## 2020-06-10 RX ORDER — FUROSEMIDE 40 MG/1
40 TABLET ORAL 2 TIMES DAILY
Qty: 60 TABLET | Refills: 11 | Status: SHIPPED | OUTPATIENT
Start: 2020-06-10 | End: 2021-07-11

## 2020-06-10 RX ORDER — OXYCODONE AND ACETAMINOPHEN 7.5; 325 MG/1; MG/1
1 TABLET ORAL EVERY 6 HOURS PRN
COMMUNITY
Start: 2020-06-08 | End: 2020-11-03

## 2020-06-10 NOTE — TELEPHONE ENCOUNTER
----- Message from Devora Curry MA sent at 6/10/2020  7:30 AM EDT -----  Regarding: FW: Visit Follow-Up Question  Contact: [phone]      ----- Message -----  From: Javier San  Sent: 6/9/2020  10:23 PM EDT  [facility]  Subject: Visit Follow-Up Question                         Dr. Gaffney,  At some of my previous appointments, we have talked about the swelling in my feel and ankles, especially on the right side. This has gotten much worse.  Is there some specialist that might be able to help me with this?  I really need help.  Please advise me what I can do.

## 2020-06-10 NOTE — PROGRESS NOTES
Subjective     Javier San is a 75 y.o. male who presents with   Chief Complaint   Patient presents with   • Foot Swelling       History of Present Illness     C/O LE edema.  Chronic but worsening.  He has diastolic dysfunction and is followed by cardiology.  No SOA.  No PND.  No orthopnea.  Moderate severity of progressive of symptoms for the past couple weeks.     Now with a cough in last day.  No fever.  No SOA.  He has head and nasal congestion.  It feels identical to his typical bronchitis symptoms.  He is on lasix for edema.      Review of Systems   Constitutional: Positive for fever.       The following portions of the patient's history were reviewed and updated as appropriate: allergies, current medications and problem list.    Patient Active Problem List    Diagnosis Date Noted   • Peripheral neuropathic pain 01/30/2020   • Lumbar spondylosis 01/30/2020   • Essential (primary) hypertension 01/17/2020   • Dysautonomia (CMS/[facility]) 01/17/2020   • Labile blood pressure 01/16/2020   • Small fiber neuropathy 04/10/2019   • Raynaud's disease without gangrene 04/10/2019   • Atrioventricular block, complete (CMS/[facility]) 03/01/2019   • Class 2 severe obesity due to excess calories with serious comorbidity and body mass index (BMI) of 39.0 to 39.9 in adult (CMS/[facility]) 11/05/2018   • History of pacemaker 04/16/2018   • Parkinson disease (CMS/[facility]) 10/30/2017   • Benign prostatic hyperplasia with lower urinary tract symptoms 12/07/2016   • Hyperlipemia 10/27/2016   • Thyroid nodule 10/26/2016     Note Last Updated: 10/31/2017     Noted on CT chest 3/2016.  Stable 9/2016.  Followed by ENT.       • History of melanoma 04/15/2016     Note Last Updated: 4/15/2016     Times two.       • Atopic rhinitis 03/01/2016   • Arthritis 03/01/2016   • Asthma 03/01/2016   • Degeneration of intervertebral disc of lumbar region 03/01/2016   • Hypothyroidism 03/01/2016   • Impaired fasting glucose 03/01/2016   • Lumbar radiculopathy  03/01/2016   • Spinal stenosis of lumbar region 03/01/2016   • Lung nodule 03/01/2016     Note Last Updated: 10/31/2017     Follow over two years by Dr. Garnica.      • Obstructive sleep apnea syndrome 03/01/2016     Note Last Updated: 3/1/2016     Description: Patient is maintained on BIPAP.         Current Outpatient Medications on File Prior to Visit   Medication Sig Dispense Refill   • acetaminophen (TYLENOL) 325 MG tablet Take 650 mg by mouth Every 6 (Six) Hours As Needed for mild pain (1-3).     • albuterol (ACCUNEB) 1.25 MG/3ML nebulizer solution Take 1 ampule by nebulization Every 6 (Six) Hours As Needed for wheezing.     • albuterol (PROVENTIL HFA;VENTOLIN HFA) 108 (90 BASE) MCG/ACT inhaler Inhale 2 puffs Every 6 (Six) Hours As Needed. ProAir  (90 Base) MCG/ACT Inhalation Aerosol Solution; Patient Sig: ProAir  (90 Base) MCG/ACT Inhalation Aerosol Solution INHALE 1 TO 2 PUFFS EVERY 4 TO 6 HOURS AS NEEDED.; 0; 29-Sep-2014; Active     • atorvastatin (LIPITOR) 10 MG tablet TAKE 1 TABLET BY MOUTH EVERY DAY 90 tablet 1   • azelastine (ASTELIN) 0.1 % nasal spray 1 spray into each nostril 2 (Two) Times a Day. Use 1 to 2 sprays     • carbidopa-levodopa (SINEMET)  MG per tablet TK 1 T PO four time daily  2   • Cholecalciferol (VITAMIN D) 2000 units tablet Take 2,000 Units by mouth Daily.     • Cyanocobalamin (B-12 PO) Take  by mouth.     • DULoxetine (CYMBALTA) 30 MG capsule Take 60 mg by mouth 2 (Two) Times a Day.     • Fluticasone Furoate-Vilanterol 100-25 MCG/INH aerosol powder  Breo Ellipta 100 mcg-25 mcg/dose powder for inhalation     • gabapentin (NEURONTIN) 300 MG capsule TAKE 2 CAPSULES BY MOUTH THREE TIMES DAILY 180 capsule 0   • HYDROcodone-acetaminophen (NORCO) 7.5-325 MG per tablet TK 1 T PO Q DAY PRN  0   • levothyroxine (SYNTHROID, LEVOTHROID) 137 MCG tablet TAKE 1 TABLET BY MOUTH EVERY DAY 90 tablet 0   • Loratadine 10 MG capsule Take 10 mg by mouth Daily.     • losartan (COZAAR) 50 MG  "tablet TAKE 1 TABLET BY MOUTH EVERY DAY 90 tablet 0   • montelukast (SINGULAIR) 10 MG tablet Take 10 mg by mouth Every Night.     • oxyCODONE-acetaminophen (PERCOCET) 7.5-325 MG per tablet Take 1 tablet by mouth Every 6 (Six) Hours As Needed. for pain     • sildenafil (VIAGRA) 25 MG tablet Take 25 mg by mouth daily as needed for erectile dysfunction.     • vitamin C (ASCORBIC ACID) 250 MG tablet Take 250 mg by mouth daily.       No current facility-administered medications on file prior to visit.        Objective     /68   Pulse 78   Temp 98.6 °F (37 °C) (Temporal)   Resp 20   Ht 170.2 cm (67.01\")   Wt 119 kg (263 lb)   BMI 41.18 kg/m²     Physical Exam   Constitutional: He is oriented to person, place, and time. He appears well-developed and well-nourished.   HENT:   Head: Atraumatic.   Cardiovascular: Normal rate, regular rhythm and normal heart sounds.   2+ LE edema   Pulmonary/Chest: Effort normal and breath sounds normal.   Neurological: He is alert and oriented to person, place, and time.   Skin: Skin is warm and dry.   Psychiatric: He has a normal mood and affect.       Assessment/Plan   Javier was seen today for foot swelling.    Diagnoses and all orders for this visit:    Bilateral leg edema  -     Cancel: CBC & Differential  -     Cancel: Comprehensive Metabolic Panel  -     Cancel: TSH Rfx On Abnormal To Free T4  -     COVID-19,[facility] ROUTINE, NP/OP SWAB IN TRANSPORT MEDIA OR ESWAB 72 HR TAT - Swab, Nasopharynx; Future    Cough    Other orders  -     furosemide (Lasix) 40 MG tablet; Take 1 tablet by mouth 2 (Two) Times a Day.        Discussion    F/u LE edema.  Likely dependent and secondary to diastolic dysfunction.  Increase lasix to 40 BID.  He has a new cough.  I think it is likely secondary to his typically bronchitis.  However, Before further work up for edema done, I am going to rule out COVID-19.  Orders placed to go to [facility].         Future Appointments   Date Time Provider Department " [place]   7/10/2020 10:00 AM [facility] None   7/16/2020 10:30 AM [facility] IN [facility]ICE, [facility] None   7/16/2020 11:00 AM Sindy Hernández, [facility] None   7/17/2020  2:15 PM Lainey Gaffney MD [facility] None   1/15/2021  9:40 AM [facility] None   1/22/2021  1:30 PM Lainey Gaffney MD [facility] None

## 2020-06-25 ENCOUNTER — TELEPHONE (OUTPATIENT)
Dept: CARDIOLOGY | Facility: CLINIC | Age: 75
End: 2020-06-25

## 2020-06-25 NOTE — TELEPHONE ENCOUNTER
Pt is scheduled to see TK on 7/16/2020 he is also scheduled that day for a PM check    Can you please reschedule him for a day in the office so that he can get both appts done on the same day    Thank You :)

## 2020-06-25 NOTE — TELEPHONE ENCOUNTER
Caitlyn,   I scheduled pt, left a voicemail, and will also mail a letter. Pt is also active in [facility], so he should receive an email about the appointment being rescheduled.   Thanks,  Curt

## 2020-07-01 RX ORDER — LEVOTHYROXINE SODIUM 137 UG/1
TABLET ORAL
Qty: 90 TABLET | Refills: 0 | Status: SHIPPED | OUTPATIENT
Start: 2020-07-01 | End: 2020-07-02

## 2020-07-02 RX ORDER — LEVOTHYROXINE SODIUM 137 UG/1
TABLET ORAL
Qty: 90 TABLET | Refills: 0 | Status: SHIPPED | OUTPATIENT
Start: 2020-07-02 | End: 2020-07-17 | Stop reason: SDUPTHER

## 2020-07-09 DIAGNOSIS — E78.5 HYPERLIPIDEMIA, UNSPECIFIED HYPERLIPIDEMIA TYPE: Primary | ICD-10-CM

## 2020-07-09 DIAGNOSIS — I10 HYPERTENSION, UNSPECIFIED TYPE: ICD-10-CM

## 2020-07-09 DIAGNOSIS — E03.9 HYPOTHYROIDISM, UNSPECIFIED TYPE: ICD-10-CM

## 2020-07-10 LAB
ALBUMIN SERPL-MCNC: 4.5 G/DL (ref 3.5–5.2)
ALBUMIN/GLOB SERPL: 2 G/DL
ALP SERPL-CCNC: 119 U/L (ref 39–117)
ALT SERPL-CCNC: 13 U/L (ref 1–41)
AST SERPL-CCNC: 14 U/L (ref 1–40)
BASOPHILS # BLD AUTO: 0.04 10*3/MM3 (ref 0–0.2)
BASOPHILS NFR BLD AUTO: 0.3 % (ref 0–1.5)
BILIRUB SERPL-MCNC: 0.7 MG/DL (ref 0–1.2)
BUN SERPL-MCNC: 23 MG/DL (ref 8–23)
BUN/CREAT SERPL: 19 (ref 7–25)
CALCIUM SERPL-MCNC: 9.3 MG/DL (ref 8.6–10.5)
CHLORIDE SERPL-SCNC: 102 MMOL/L (ref 98–107)
CHOLEST SERPL-MCNC: 169 MG/DL (ref 0–200)
CO2 SERPL-SCNC: 29.7 MMOL/L (ref 22–29)
CREAT SERPL-MCNC: 1.21 MG/DL (ref 0.76–1.27)
EOSINOPHIL # BLD AUTO: 0.08 10*3/MM3 (ref 0–0.4)
EOSINOPHIL NFR BLD AUTO: 0.7 % (ref 0.3–6.2)
ERYTHROCYTE [DISTWIDTH] IN BLOOD BY AUTOMATED COUNT: 13.6 % (ref 12.3–15.4)
GLOBULIN SER CALC-MCNC: 2.2 GM/DL
GLUCOSE SERPL-MCNC: 86 MG/DL (ref 65–99)
HCT VFR BLD AUTO: 42.4 % (ref 37.5–51)
HDLC SERPL-MCNC: 56 MG/DL (ref 40–60)
HGB BLD-MCNC: 14.2 G/DL (ref 13–17.7)
IMM GRANULOCYTES # BLD AUTO: 0.09 10*3/MM3 (ref 0–0.05)
IMM GRANULOCYTES NFR BLD AUTO: 0.8 % (ref 0–0.5)
LDLC SERPL CALC-MCNC: 76 MG/DL (ref 0–100)
LYMPHOCYTES # BLD AUTO: 2.26 10*3/MM3 (ref 0.7–3.1)
LYMPHOCYTES NFR BLD AUTO: 19.4 % (ref 19.6–45.3)
MCH RBC QN AUTO: 31.2 PG (ref 26.6–33)
MCHC RBC AUTO-ENTMCNC: 33.5 G/DL (ref 31.5–35.7)
MCV RBC AUTO: 93.2 FL (ref 79–97)
MONOCYTES # BLD AUTO: 0.74 10*3/MM3 (ref 0.1–0.9)
MONOCYTES NFR BLD AUTO: 6.4 % (ref 5–12)
NEUTROPHILS # BLD AUTO: 8.43 10*3/MM3 (ref 1.7–7)
NEUTROPHILS NFR BLD AUTO: 72.4 % (ref 42.7–76)
NRBC BLD AUTO-RTO: 0 /100 WBC (ref 0–0.2)
PLATELET # BLD AUTO: 209 10*3/MM3 (ref 140–450)
POTASSIUM SERPL-SCNC: 4.3 MMOL/L (ref 3.5–5.2)
PROT SERPL-MCNC: 6.7 G/DL (ref 6–8.5)
RBC # BLD AUTO: 4.55 10*6/MM3 (ref 4.14–5.8)
SODIUM SERPL-SCNC: 142 MMOL/L (ref 136–145)
T4 FREE SERPL-MCNC: 0.87 NG/DL (ref 0.93–1.7)
TRIGL SERPL-MCNC: 187 MG/DL (ref 0–150)
TSH SERPL DL<=0.005 MIU/L-ACNC: 23.5 UIU/ML (ref 0.27–4.2)
VLDLC SERPL CALC-MCNC: 37.4 MG/DL
WBC # BLD AUTO: 11.64 10*3/MM3 (ref 3.4–10.8)

## 2020-07-13 RX ORDER — ATORVASTATIN CALCIUM 10 MG/1
TABLET, FILM COATED ORAL
Qty: 90 TABLET | Refills: 1 | Status: SHIPPED | OUTPATIENT
Start: 2020-07-13 | End: 2020-12-28

## 2020-07-17 ENCOUNTER — OFFICE VISIT (OUTPATIENT)
Dept: INTERNAL MEDICINE | Facility: CLINIC | Age: 75
End: 2020-07-17

## 2020-07-17 VITALS
DIASTOLIC BLOOD PRESSURE: 64 MMHG | HEIGHT: 67 IN | SYSTOLIC BLOOD PRESSURE: 110 MMHG | BODY MASS INDEX: 41.12 KG/M2 | WEIGHT: 262 LBS | HEART RATE: 76 BPM | OXYGEN SATURATION: 97 %

## 2020-07-17 DIAGNOSIS — G20 PARKINSON DISEASE (HCC): ICD-10-CM

## 2020-07-17 DIAGNOSIS — G90.1 DYSAUTONOMIA (HCC): ICD-10-CM

## 2020-07-17 DIAGNOSIS — E78.00 PURE HYPERCHOLESTEROLEMIA: ICD-10-CM

## 2020-07-17 DIAGNOSIS — E03.9 ACQUIRED HYPOTHYROIDISM: ICD-10-CM

## 2020-07-17 DIAGNOSIS — I10 ESSENTIAL (PRIMARY) HYPERTENSION: Primary | ICD-10-CM

## 2020-07-17 PROCEDURE — 99214 OFFICE O/P EST MOD 30 MIN: CPT | Performed by: INTERNAL MEDICINE

## 2020-07-17 RX ORDER — LEVOTHYROXINE SODIUM 0.15 MG/1
150 TABLET ORAL DAILY
Qty: 90 TABLET | Refills: 3 | Status: SHIPPED | OUTPATIENT
Start: 2020-07-17 | End: 2021-08-09

## 2020-07-17 RX ORDER — CYCLOBENZAPRINE HCL 10 MG
10 TABLET ORAL 3 TIMES DAILY PRN
COMMUNITY
End: 2021-03-29 | Stop reason: ALTCHOICE

## 2020-07-17 NOTE — PROGRESS NOTES
Subjective     Javier San is a 75 y.o. male who presents with   Chief Complaint   Patient presents with   • Hypertension   • Hyperlipidemia   • Hypothyroidism       History of Present Illness     HTN.  Fair control with current.   HLD. Excellent control on atorvastatin.    Hypthyroidism. Control levothyroxine has slipped.   Dr. Ford follows his parkinson's disease which is stable. He has associated dysautonomia.      Review of Systems   Constitutional: Negative for fever.   Respiratory: Negative.    Cardiovascular: Negative.        The following portions of the patient's history were reviewed and updated as appropriate: allergies, current medications and problem list.    Patient Active Problem List    Diagnosis Date Noted   • Peripheral neuropathic pain 01/30/2020   • Lumbar spondylosis 01/30/2020   • Essential (primary) hypertension 01/17/2020   • Dysautonomia (CMS/[facility]) 01/17/2020   • Labile blood pressure 01/16/2020   • Small fiber neuropathy 04/10/2019   • Raynaud's disease without gangrene 04/10/2019   • Atrioventricular block, complete (CMS/[facility]) 03/01/2019   • Class 2 severe obesity due to excess calories with serious comorbidity and body mass index (BMI) of 39.0 to 39.9 in adult (CMS/[facility]) 11/05/2018   • History of pacemaker 04/16/2018   • Parkinson disease (CMS/[facility]) 10/30/2017   • Benign prostatic hyperplasia with lower urinary tract symptoms 12/07/2016   • Hyperlipemia 10/27/2016   • Thyroid nodule 10/26/2016     Note Last Updated: 10/31/2017     Noted on CT chest 3/2016.  Stable 9/2016.  Followed by ENT.       • History of melanoma 04/15/2016     Note Last Updated: 4/15/2016     Times two.       • Atopic rhinitis 03/01/2016   • Arthritis 03/01/2016   • Asthma 03/01/2016   • Degeneration of intervertebral disc of lumbar region 03/01/2016   • Hypothyroidism 03/01/2016   • Impaired fasting glucose 03/01/2016   • Lumbar radiculopathy 03/01/2016   • Spinal stenosis of lumbar region 03/01/2016   • Lung  nodule 03/01/2016     Note Last Updated: 10/31/2017     Follow over two years by Dr. Garnica.      • Obstructive sleep apnea syndrome 03/01/2016     Note Last Updated: 3/1/2016     Description: Patient is maintained on BIPAP.         Current Outpatient Medications on File Prior to Visit   Medication Sig Dispense Refill   • acetaminophen (TYLENOL) 325 MG tablet Take 650 mg by mouth Every 6 (Six) Hours As Needed for mild pain (1-3).     • albuterol (ACCUNEB) 1.25 MG/3ML nebulizer solution Take 1 ampule by nebulization Every 6 (Six) Hours As Needed for wheezing.     • albuterol (PROVENTIL HFA;VENTOLIN HFA) 108 (90 BASE) MCG/ACT inhaler Inhale 2 puffs Every 6 (Six) Hours As Needed. ProAir  (90 Base) MCG/ACT Inhalation Aerosol Solution; Patient Sig: ProAir  (90 Base) MCG/ACT Inhalation Aerosol Solution INHALE 1 TO 2 PUFFS EVERY 4 TO 6 HOURS AS NEEDED.; 0; 29-Sep-2014; Active     • atorvastatin (LIPITOR) 10 MG tablet TAKE 1 TABLET BY MOUTH EVERY DAY 90 tablet 1   • azelastine (ASTELIN) 0.1 % nasal spray 1 spray into each nostril 2 (Two) Times a Day. Use 1 to 2 sprays     • carbidopa-levodopa (SINEMET)  MG per tablet TK 1 T PO four time daily  2   • Cholecalciferol (VITAMIN D) 2000 units tablet Take 2,000 Units by mouth Daily.     • Cyanocobalamin (B-12 PO) Take  by mouth.     • cyclobenzaprine (FLEXERIL) 10 MG tablet Take 10 mg by mouth 3 (Three) Times a Day As Needed for Muscle Spasms.     • DULoxetine (CYMBALTA) 30 MG capsule Take 60 mg by mouth 2 (Two) Times a Day.     • Fluticasone Furoate-Vilanterol 100-25 MCG/INH aerosol powder  Breo Ellipta 100 mcg-25 mcg/dose powder for inhalation     • furosemide (Lasix) 40 MG tablet Take 1 tablet by mouth 2 (Two) Times a Day. 60 tablet 11   • HYDROcodone-acetaminophen (NORCO) 7.5-325 MG per tablet TK 1 T PO Q DAY PRN  0   • Loratadine 10 MG capsule Take 10 mg by mouth Daily.     • losartan (COZAAR) 50 MG tablet TAKE 1 TABLET BY MOUTH EVERY DAY 90 tablet 0   •  "montelukast (SINGULAIR) 10 MG tablet Take 10 mg by mouth Every Night.     • oxyCODONE-acetaminophen (PERCOCET) 7.5-325 MG per tablet Take 1 tablet by mouth Every 6 (Six) Hours As Needed. for pain     • sildenafil (VIAGRA) 25 MG tablet Take 25 mg by mouth daily as needed for erectile dysfunction.     • vitamin C (ASCORBIC ACID) 250 MG tablet Take 250 mg by mouth daily.     • [DISCONTINUED] levothyroxine (SYNTHROID, LEVOTHROID) 137 MCG tablet TAKE 1 TABLET BY MOUTH EVERY DAY 90 tablet 0   • [DISCONTINUED] gabapentin (NEURONTIN) 300 MG capsule TAKE 2 CAPSULES BY MOUTH THREE TIMES DAILY 180 capsule 0     No current facility-administered medications on file prior to visit.        Objective     /64   Pulse 76   Ht 170.2 cm (67.01\")   Wt 119 kg (262 lb)   SpO2 97%   BMI 41.03 kg/m²     Physical Exam   Constitutional: He is oriented to person, place, and time. He appears well-developed and well-nourished.   HENT:   Head: Atraumatic.   Cardiovascular: Normal rate, regular rhythm and normal heart sounds.   Pulmonary/Chest: Effort normal and breath sounds normal.   Neurological: He is alert and oriented to person, place, and time.   Skin: Skin is warm and dry.   Psychiatric: He has a normal mood and affect.       Assessment/Plan   Javier was seen today for hypertension, hyperlipidemia and hypothyroidism.    Diagnoses and all orders for this visit:    Essential (primary) hypertension    Pure hypercholesterolemia    Acquired hypothyroidism    Parkinson disease (CMS/HCC)    Dysautonomia (CMS/HCC)    Other orders  -     levothyroxine (SYNTHROID, LEVOTHROID) 150 MCG tablet; Take 1 tablet by mouth Daily.        Discussion    Htn.  Continue current regimen.  HLD.  Continue atorvastatin.   Hypothyroidism.  Increase levothyroxine to 150mcg daily.  Recheck labs eight weeks.     Parkinson's disease with dysautonomia.  Continue with neurology.       Future Appointments   Date Time Provider Department Center   8/7/2020  2:00 PM " [facility] IN [facility]ICE, [facility] [place] [facility] None   8/7/2020  2:30 PM Sindy Hernández APRN [facility] None   1/15/2021  9:40 AM [facility] [place] [facility] None   1/22/2021  1:30 PM Lainey Gaffney MD [facility] None

## 2020-08-07 ENCOUNTER — CLINICAL SUPPORT NO REQUIREMENTS (OUTPATIENT)
Dept: CARDIOLOGY | Facility: CLINIC | Age: 75
End: 2020-08-07

## 2020-08-07 ENCOUNTER — OFFICE VISIT (OUTPATIENT)
Dept: CARDIOLOGY | Facility: CLINIC | Age: 75
End: 2020-08-07

## 2020-08-07 VITALS
WEIGHT: 264 LBS | SYSTOLIC BLOOD PRESSURE: 112 MMHG | DIASTOLIC BLOOD PRESSURE: 70 MMHG | BODY MASS INDEX: 41.44 KG/M2 | HEART RATE: 79 BPM | HEIGHT: 67 IN

## 2020-08-07 DIAGNOSIS — R06.09 DYSPNEA ON EXERTION: ICD-10-CM

## 2020-08-07 DIAGNOSIS — G20 PARKINSON DISEASE (HCC): ICD-10-CM

## 2020-08-07 DIAGNOSIS — E66.01 CLASS 3 SEVERE OBESITY DUE TO EXCESS CALORIES WITHOUT SERIOUS COMORBIDITY WITH BODY MASS INDEX (BMI) OF 40.0 TO 44.9 IN ADULT (HCC): ICD-10-CM

## 2020-08-07 DIAGNOSIS — R60.0 LOWER EXTREMITY EDEMA: ICD-10-CM

## 2020-08-07 DIAGNOSIS — I44.2 THIRD DEGREE AV BLOCK (HCC): Primary | ICD-10-CM

## 2020-08-07 DIAGNOSIS — R09.89 LABILE BLOOD PRESSURE: Primary | ICD-10-CM

## 2020-08-07 DIAGNOSIS — G90.1 DYSAUTONOMIA (HCC): ICD-10-CM

## 2020-08-07 DIAGNOSIS — I44.2 ATRIOVENTRICULAR BLOCK, COMPLETE (HCC): ICD-10-CM

## 2020-08-07 PROCEDURE — 99214 OFFICE O/P EST MOD 30 MIN: CPT | Performed by: NURSE PRACTITIONER

## 2020-08-07 PROCEDURE — 93280 PM DEVICE PROGR EVAL DUAL: CPT | Performed by: INTERNAL MEDICINE

## 2020-08-07 PROCEDURE — 93000 ELECTROCARDIOGRAM COMPLETE: CPT | Performed by: NURSE PRACTITIONER

## 2020-08-07 RX ORDER — PREGABALIN 200 MG/1
200 CAPSULE ORAL 3 TIMES DAILY
COMMUNITY
End: 2020-11-03 | Stop reason: SDUPTHER

## 2020-08-07 RX ORDER — CHOLECALCIFEROL (VITAMIN D3) 125 MCG
5 CAPSULE ORAL NIGHTLY PRN
COMMUNITY

## 2020-08-07 RX ORDER — TAMSULOSIN HYDROCHLORIDE 0.4 MG/1
1 CAPSULE ORAL DAILY
COMMUNITY
End: 2020-11-03

## 2020-08-07 NOTE — PROGRESS NOTES
Date of Office Visit: 2020  Encounter Provider: VERONA Michaels  Place of Service: [facility] CARDIOLOGY  Patient Name: Javier San  :1945  Primary Cardiologist: Dr. Jono Madden     Chief Complaint   Patient presents with   • Follow-up   :     Dear Dr. Lainey Gaffney,     HPI: Javier San is a pleasant 75 y.o. male who presents today for cardiac follow up.     He has a known history of labile blood pressure, orthostasis, and near syncope.  In 2016, he developed second-degree heart block and underwent dual-chamber pacemaker placement.  He also has Parkinson's disease and small fiber neuropathy which contributes to the labile blood pressure.    In 2019, echocardiogram showed normal EF, wall motion abnormalities consistent with RV pacing, and grade 1A diastolic dysfunction.  Pacemaker check in the past showed a nonsustained run of ventricular tachycardia.    He presents today with his wife accompanying him.  He had back surgery back in May and is still recovering.  He has been working with physical therapy.  They are both concerned about his labile blood pressures which has been chronic problem for him in the past.  Today his blood pressure is 112/70 and they have seen it as high as 159/90 at home.  He says he is symptomatic when his blood pressure is low.  He continues to have chronic lower extremity edema and shortness of breath unchanged.  He has had a couple falls.  He denies chest pain, palpitations, dizziness, or syncope.  He drinks about 32 ounces of fluid daily.    Past Medical History:   Diagnosis Date   • Abnormal blood chemistry    • Abnormal liver enzymes    • Acute bacterial prostatitis    • Anemia    • Anxiety 2014    Lainey Colunga   • Arthritis    • Asthma 3/1/2016   • Basal cell carcinoma    • Chronic bronchitis (CMS/HCC) 3/1/2016   • Gynecomastia    • H/O degenerative disc disease    • H/O hernia repair    • Hypertension  3/1/2016    Impression: 04/08/2015 - .  Impression: 03/24/2014 - Controlled with HCTZ.;    • Hypokalemia    • Hypothyroidism    • Labile hypertension 03/01/2016    with periods of orthostasis and near syncope   • Left bundle branch block    • Low back pain    • Lumbar radiculopathy    • Lumbar stenosis    • Lung nodule     CT of chest 2/9/15   • Malignant neoplasm of cheek (CMS/HCC) 3/1/2016    Description: - Basal Cell   • Mobitz (type) II atrioventricular block     s/p [employer] dual chamber PPM 2016   • Obstructive sleep apnea    • Parkinson disease (CMS/HCC)    • Rupture of kidney     in high school football   • Skin cancer of nose     Basal Cell   • Stricture of ureter     WITH HYDRONEPHROSIS   • Testicular pain    • Venous insufficiency    • Vitamin D deficiency        Past Surgical History:   Procedure Laterality Date   • BACK SURGERY      L4-L5 with Dr. Solomon   • CARDIAC ELECTROPHYSIOLOGY PROCEDURE N/A 6/17/2016    Procedure: Pacemaker DC new  [place];  Surgeon: Zachary Narayanan MD;  Location: [facility] INVASIVE LOCATION;  Service:    • COLON SURGERY      Complete colonoscopy- Dr. knox/[facility]   • CYSTOSCOPY TRANSURETHRAL RESECTION OF PROSTATE     • CYSTOSCOPY TRANSURETHRAL RESECTION OF PROSTATE N/A 12/7/2016    Procedure: CYSTOSCOPY TRANSURETHRAL RESECTION OF PROSTATE;  Surgeon: Law Dee MD;  Location: [facility];  Service:    • HERNIA REPAIR     • NASAL SEPTUM SURGERY     • NOSE SURGERY      deviated septum   • PROSTATE SURGERY      transurethral destruction prostate tissue   • REPLACEMENT TOTAL KNEE BILATERAL      right in 2008 and left in 2005   • TONSILLECTOMY     • TOTAL SHOULDER REPLACEMENT Bilateral     left in 2011 and right in 2013       Social History     Socioeconomic History   • Marital status:      Spouse name: Not on file   • Number of children: Not on file   • Years of education: Not on file   • Highest education level: Not on file   Tobacco Use   • Smoking status: Former  Smoker     Packs/day: 1.00     Types: Cigarettes     Start date: 1964     Last attempt to quit: 1984     Years since quittin.1   • Smokeless tobacco: Never Used   Substance and Sexual Activity   • Alcohol use: Yes     Comment: No caffeine use   • Drug use: No   • Sexual activity: Defer       Family History   Problem Relation Age of Onset   • Alcohol abuse Mother    • Cancer Mother         skin   • Stroke Mother    • Alcohol abuse Father    • Emphysema Father    • COPD Father    • Cancer Father         skin       The following portion of the patient's history were reviewed and updated as appropriate: past medical history, past surgical history, past social history, past family history, allergies, current medications, and problem list.    Review of Systems   Constitution: Negative for chills, diaphoresis, fever, malaise/fatigue, night sweats, weight gain and weight loss.   HENT: Negative for hearing loss, nosebleeds, sore throat and tinnitus.    Eyes: Negative for blurred vision, double vision, pain and visual disturbance.   Cardiovascular: Positive for dyspnea on exertion and leg swelling. Negative for chest pain, claudication, cyanosis, irregular heartbeat, near-syncope, orthopnea, palpitations, paroxysmal nocturnal dyspnea and syncope.   Respiratory: Positive for shortness of breath. Negative for cough, hemoptysis, snoring and wheezing.    Endocrine: Negative for cold intolerance, heat intolerance and polyuria.   Hematologic/Lymphatic: Negative for bleeding problem. Does not bruise/bleed easily.   Skin: Negative for color change, dry skin, flushing and itching.   Musculoskeletal: Negative for falls, joint pain, joint swelling, muscle cramps, muscle weakness and myalgias.   Gastrointestinal: Negative for abdominal pain, constipation, heartburn, melena, nausea and vomiting.   Genitourinary: Negative for dysuria and hematuria.   Neurological: Negative for excessive daytime sleepiness, dizziness,  light-headedness, loss of balance, numbness, paresthesias, seizures and vertigo.   Psychiatric/Behavioral: Negative for altered mental status, depression, memory loss and substance abuse. The patient does not have insomnia and is not nervous/anxious.    Allergic/Immunologic: Negative for environmental allergies.       Allergies   Allergen Reactions   • Levofloxacin Myalgia         Current Outpatient Medications:   •  acetaminophen (TYLENOL) 325 MG tablet, Take 650 mg by mouth Every 6 (Six) Hours As Needed for mild pain (1-3)., Disp: , Rfl:   •  albuterol (ACCUNEB) 1.25 MG/3ML nebulizer solution, Take 1 ampule by nebulization Every 6 (Six) Hours As Needed for wheezing., Disp: , Rfl:   •  albuterol (PROVENTIL HFA;VENTOLIN HFA) 108 (90 BASE) MCG/ACT inhaler, Inhale 2 puffs Every 6 (Six) Hours As Needed. ProAir  (90 Base) MCG/ACT Inhalation Aerosol Solution; Patient Sig: ProAir  (90 Base) MCG/ACT Inhalation Aerosol Solution INHALE 1 TO 2 PUFFS EVERY 4 TO 6 HOURS AS NEEDED.; 0; 29-Sep-2014; Active, Disp: , Rfl:   •  atorvastatin (LIPITOR) 10 MG tablet, TAKE 1 TABLET BY MOUTH EVERY DAY, Disp: 90 tablet, Rfl: 1  •  azelastine (ASTELIN) 0.1 % nasal spray, 1 spray into each nostril 2 (Two) Times a Day. Use 1 to 2 sprays, Disp: , Rfl:   •  carbidopa-levodopa (SINEMET)  MG per tablet, TK 1 T PO four time daily, Disp: , Rfl: 2  •  Cholecalciferol (VITAMIN D) 2000 units tablet, Take 2,000 Units by mouth Daily., Disp: , Rfl:   •  Cyanocobalamin (B-12 PO), Take  by mouth., Disp: , Rfl:   •  cyclobenzaprine (FLEXERIL) 10 MG tablet, Take 10 mg by mouth 3 (Three) Times a Day As Needed for Muscle Spasms., Disp: , Rfl:   •  DULoxetine (CYMBALTA) 30 MG capsule, Take 60 mg by mouth 2 (Two) Times a Day., Disp: , Rfl:   •  Fluticasone Furoate-Vilanterol 100-25 MCG/INH aerosol powder , Breo Ellipta 100 mcg-25 mcg/dose powder for inhalation, Disp: , Rfl:   •  furosemide (Lasix) 40 MG tablet, Take 1 tablet by mouth 2 (Two)  "Times a Day., Disp: 60 tablet, Rfl: 11  •  HYDROcodone-acetaminophen (NORCO) 7.5-325 MG per tablet, TK 1 T PO Q DAY PRN, Disp: , Rfl: 0  •  levothyroxine (SYNTHROID, LEVOTHROID) 150 MCG tablet, Take 1 tablet by mouth Daily., Disp: 90 tablet, Rfl: 3  •  Loratadine 10 MG capsule, Take 10 mg by mouth Daily., Disp: , Rfl:   •  losartan (COZAAR) 50 MG tablet, TAKE 1 TABLET BY MOUTH EVERY DAY (Patient taking differently: Take 25 mg by mouth 2 (Two) Times a Day.), Disp: 90 tablet, Rfl: 0  •  melatonin 5 MG tablet tablet, Take 5 mg by mouth At Night As Needed., Disp: , Rfl:   •  montelukast (SINGULAIR) 10 MG tablet, Take 10 mg by mouth Every Night., Disp: , Rfl:   •  oxyCODONE-acetaminophen (PERCOCET) 7.5-325 MG per tablet, Take 1 tablet by mouth Every 6 (Six) Hours As Needed. for pain, Disp: , Rfl:   •  pregabalin (LYRICA) 200 MG capsule, Take 200 mg by mouth 3 (Three) Times a Day., Disp: , Rfl:   •  sildenafil (VIAGRA) 25 MG tablet, Take 25 mg by mouth daily as needed for erectile dysfunction., Disp: , Rfl:   •  tamsulosin (FLOMAX) 0.4 MG capsule 24 hr capsule, Take 1 capsule by mouth Daily., Disp: , Rfl:   •  vitamin C (ASCORBIC ACID) 250 MG tablet, Take 250 mg by mouth daily., Disp: , Rfl:         Objective:     Vitals:    08/07/20 1444   BP: 112/70   BP Location: Left arm   Patient Position: Sitting   Pulse: 79   Weight: 120 kg (264 lb)   Height: 170.5 cm (67.13\")     Body mass index is 41.19 kg/m².    PHYSICAL EXAM:    Vitals Reviewed.   General Appearance: No acute distress, well developed and well nourished. Obese.   Eyes: Conjunctiva and lids: No erythema, swelling, or discharge. Sclera non-icteric.  Wears glasses.  HENT: Atraumatic, normocephalic. External eyes, ears, and nose normal. No hearing loss noted. Mucous membranes normal. Lips not cyanotic. Neck supple with no tenderness.  Respiratory: No signs of respiratory distress. Respiration rhythm and depth normal.   Clear to auscultation. No rales, crackles, " rhonchi, or wheezing auscultated.   Cardiovascular:  Jugular Venous Pressure: Normal  Heart Rate and Rhythm: Normal, Heart Sounds: Normal S1 and S2. No S3 or S4 noted.  Pacemaker present.  Murmurs: No murmurs noted. No rubs, thrills, or gallops.   Lower Extremities: Bilateral +1 pitting lower extremity edema noted.  Gastrointestinal:  Abdomen soft, non-distended, non-tender. Normal bowel sounds. No hepatomegaly.   Musculoskeletal: Normal movement of extremities.  Ambulates with a cane.    Skin and Nails: General appearance normal. No pallor, cyanosis, diaphoresis. Skin temperature normal. No clubbing of fingernails.   Psychiatric: Patient alert and oriented to person, place, and time. Speech and behavior appropriate. Normal mood and affect.       ECG 12 Lead  Date/Time: 8/7/2020 2:41 PM  Performed by: Sindy Hernández APRN  Authorized by: Sindy Hernández APRN   Comparison: compared with previous ECG from 6/5/2019  Similar to previous ECG  Rhythm: paced  Rate: normal  BPM: 79  Conduction: conduction normal  ST Segments: ST segments normal  T Waves: T waves normal  QRS axis: normal  Pacing: dual chamber pacing  Clinical impression: abnormal EKG              Assessment:       Diagnosis Plan   1. Labile blood pressure     2. Dysautonomia (CMS/HCC)     3. Parkinson disease (CMS/[facility])     4. Atrioventricular block, complete (CMS/HCC)     5. Dyspnea on exertion     6. Lower extremity edema     7. Class 3 severe obesity due to excess calories without serious comorbidity with body mass index (BMI) of 40.0 to 44.9 in adult (CMS/HCC)            Plan:       1.  Labile Blood Pressure: They are concerned that he is having more low blood pressure than high.  I recommended decreasing his losartan to 25 mg daily.  I recommended 64 ounces of water daily.    2.  Dysautonomia: Also contributing to labile blood pressures.    3.  Parkinson's Disease: Followed by neurology.    4.  Complete Heart Block: Status post pacemaker  placement.    5.  Shortness of Breath: Chronic and unchanged.    6.  Lower Extremity Edema: He only elevates his legs when he is lying in bed.  I recommended elevation during the daytime.  His wife said he cannot use compression stockings because it worsens of his neuropathy.  I have also recommended a low-sodium diet.  I am hesitant to increase his furosemide anymore because I do not think it is really helping.    7.  Obesity: BMI 41.2 She would benefit from exercise, weight loss, and low-sodium/heart healthy diet.    8.  Nonsustained Ventricular Tachycardia: Continue to monitor.    9.  I have asked his wife to call me with an update on his blood pressures in the next month.  He is scheduled for a follow-up appointment with Dr. Madden in 6 months, unless otherwise needed sooner.    As always, it has been a pleasure to participate in your patient's care. Thank you.       Sincerely,       VERONA Valente  [facility] Cardiology      · COVID-19 Precautions - Patient was compliant in wearing a mask. When I saw the patient, I used appropriate personal protective equipment (PPE) including mask (standard procedure).  Additionally, I used gown, gloves, and eye shield if indicated.  Hand hygiene was completed before and after seeing the patient.  · Dictated utilizing Dragon Dictation

## 2020-09-07 RX ORDER — LOSARTAN POTASSIUM 50 MG/1
TABLET ORAL
Qty: 90 TABLET | Refills: 0 | Status: SHIPPED | OUTPATIENT
Start: 2020-09-07 | End: 2021-03-15

## 2020-09-11 DIAGNOSIS — E03.9 HYPOTHYROIDISM, UNSPECIFIED TYPE: Primary | ICD-10-CM

## 2020-09-11 LAB — TSH SERPL DL<=0.005 MIU/L-ACNC: 1.78 UIU/ML (ref 0.27–4.2)

## 2020-11-03 ENCOUNTER — APPOINTMENT (OUTPATIENT)
Dept: GENERAL RADIOLOGY | Facility: HOSPITAL | Age: 75
End: 2020-11-03

## 2020-11-03 PROCEDURE — 71046 X-RAY EXAM CHEST 2 VIEWS: CPT | Performed by: FAMILY MEDICINE

## 2020-12-11 ENCOUNTER — LAB REQUISITION (OUTPATIENT)
Dept: LAB | Facility: HOSPITAL | Age: 75
End: 2020-12-11

## 2020-12-11 DIAGNOSIS — Z00.00 ENCOUNTER FOR GENERAL ADULT MEDICAL EXAMINATION WITHOUT ABNORMAL FINDINGS: ICD-10-CM

## 2020-12-11 PROCEDURE — U0004 COV-19 TEST NON-CDC HGH THRU: HCPCS | Performed by: ANESTHESIOLOGY

## 2020-12-12 LAB — SARS-COV-2 RNA RESP QL NAA+PROBE: NOT DETECTED

## 2020-12-28 RX ORDER — ATORVASTATIN CALCIUM 10 MG/1
TABLET, FILM COATED ORAL
Qty: 90 TABLET | Refills: 1 | Status: SHIPPED | OUTPATIENT
Start: 2020-12-28 | End: 2021-06-07

## 2021-01-14 ENCOUNTER — TRANSCRIBE ORDERS (OUTPATIENT)
Dept: ADMINISTRATIVE | Facility: HOSPITAL | Age: 76
End: 2021-01-14

## 2021-01-14 DIAGNOSIS — R73.01 IMPAIRED FASTING GLUCOSE: ICD-10-CM

## 2021-01-14 DIAGNOSIS — I71.40 ABDOMINAL AORTIC ANEURYSM WITHOUT RUPTURE (HCC): Primary | ICD-10-CM

## 2021-01-14 DIAGNOSIS — E03.9 HYPOTHYROIDISM, UNSPECIFIED TYPE: ICD-10-CM

## 2021-01-14 DIAGNOSIS — Z00.00 HEALTHCARE MAINTENANCE: Primary | ICD-10-CM

## 2021-01-14 DIAGNOSIS — I10 ESSENTIAL (PRIMARY) HYPERTENSION: ICD-10-CM

## 2021-01-14 DIAGNOSIS — E78.00 PURE HYPERCHOLESTEROLEMIA: ICD-10-CM

## 2021-01-16 LAB
ALBUMIN SERPL-MCNC: 4.3 G/DL (ref 3.5–5.2)
ALBUMIN/CREAT UR: 16 MG/G CREAT (ref 0–29)
ALBUMIN/GLOB SERPL: 1.9 G/DL
ALP SERPL-CCNC: 100 U/L (ref 39–117)
ALT SERPL-CCNC: 9 U/L (ref 1–41)
APPEARANCE UR: CLEAR
AST SERPL-CCNC: 19 U/L (ref 1–40)
BACTERIA #/AREA URNS HPF: NORMAL /HPF
BASOPHILS # BLD AUTO: 0.04 10*3/MM3 (ref 0–0.2)
BASOPHILS NFR BLD AUTO: 0.6 % (ref 0–1.5)
BILIRUB SERPL-MCNC: 0.7 MG/DL (ref 0–1.2)
BILIRUB UR QL STRIP: NEGATIVE
BUN SERPL-MCNC: 18 MG/DL (ref 8–23)
BUN/CREAT SERPL: 20.9 (ref 7–25)
CALCIUM SERPL-MCNC: 9.1 MG/DL (ref 8.6–10.5)
CHLORIDE SERPL-SCNC: 101 MMOL/L (ref 98–107)
CHOLEST SERPL-MCNC: 128 MG/DL (ref 0–200)
CO2 SERPL-SCNC: 27.3 MMOL/L (ref 22–29)
COLOR UR: YELLOW
CREAT SERPL-MCNC: 0.86 MG/DL (ref 0.76–1.27)
CREAT UR-MCNC: 182.8 MG/DL
EOSINOPHIL # BLD AUTO: 0.16 10*3/MM3 (ref 0–0.4)
EOSINOPHIL NFR BLD AUTO: 2.4 % (ref 0.3–6.2)
EPI CELLS #/AREA URNS HPF: NORMAL /HPF (ref 0–10)
ERYTHROCYTE [DISTWIDTH] IN BLOOD BY AUTOMATED COUNT: 13 % (ref 12.3–15.4)
GLOBULIN SER CALC-MCNC: 2.3 GM/DL
GLUCOSE SERPL-MCNC: 92 MG/DL (ref 65–99)
GLUCOSE UR QL: NEGATIVE
HCT VFR BLD AUTO: 45.7 % (ref 37.5–51)
HDLC SERPL-MCNC: 45 MG/DL (ref 40–60)
HGB BLD-MCNC: 15.7 G/DL (ref 13–17.7)
HGB UR QL STRIP: NEGATIVE
IMM GRANULOCYTES # BLD AUTO: 0.03 10*3/MM3 (ref 0–0.05)
IMM GRANULOCYTES NFR BLD AUTO: 0.5 % (ref 0–0.5)
KETONES UR QL STRIP: ABNORMAL
LDLC SERPL CALC-MCNC: 61 MG/DL (ref 0–100)
LEUKOCYTE ESTERASE UR QL STRIP: NEGATIVE
LYMPHOCYTES # BLD AUTO: 1.25 10*3/MM3 (ref 0.7–3.1)
LYMPHOCYTES NFR BLD AUTO: 18.8 % (ref 19.6–45.3)
MCH RBC QN AUTO: 32.6 PG (ref 26.6–33)
MCHC RBC AUTO-ENTMCNC: 34.4 G/DL (ref 31.5–35.7)
MCV RBC AUTO: 95 FL (ref 79–97)
MICRO URNS: ABNORMAL
MICRO URNS: ABNORMAL
MICROALBUMIN UR-MCNC: 29.9 UG/ML
MONOCYTES # BLD AUTO: 0.72 10*3/MM3 (ref 0.1–0.9)
MONOCYTES NFR BLD AUTO: 10.8 % (ref 5–12)
MUCOUS THREADS URNS QL MICRO: PRESENT /HPF
NEUTROPHILS # BLD AUTO: 4.44 10*3/MM3 (ref 1.7–7)
NEUTROPHILS NFR BLD AUTO: 66.9 % (ref 42.7–76)
NITRITE UR QL STRIP: NEGATIVE
NRBC BLD AUTO-RTO: 0 /100 WBC (ref 0–0.2)
PH UR STRIP: 5.5 [PH] (ref 5–7.5)
PLATELET # BLD AUTO: 196 10*3/MM3 (ref 140–450)
POTASSIUM SERPL-SCNC: 4.1 MMOL/L (ref 3.5–5.2)
PROT SERPL-MCNC: 6.6 G/DL (ref 6–8.5)
PROT UR QL STRIP: ABNORMAL
RBC # BLD AUTO: 4.81 10*6/MM3 (ref 4.14–5.8)
RBC #/AREA URNS HPF: NORMAL /HPF (ref 0–2)
SODIUM SERPL-SCNC: 139 MMOL/L (ref 136–145)
SP GR UR: 1.03 (ref 1–1.03)
TRIGL SERPL-MCNC: 120 MG/DL (ref 0–150)
TSH SERPL DL<=0.005 MIU/L-ACNC: 3.06 UIU/ML (ref 0.27–4.2)
URINALYSIS REFLEX: ABNORMAL
UROBILINOGEN UR STRIP-MCNC: 0.2 MG/DL (ref 0.2–1)
VLDLC SERPL CALC-MCNC: 22 MG/DL (ref 5–40)
WBC # BLD AUTO: 6.64 10*3/MM3 (ref 3.4–10.8)
WBC #/AREA URNS HPF: NORMAL /HPF (ref 0–5)

## 2021-01-22 ENCOUNTER — OFFICE VISIT (OUTPATIENT)
Dept: INTERNAL MEDICINE | Facility: CLINIC | Age: 76
End: 2021-01-22

## 2021-01-22 VITALS
WEIGHT: 250 LBS | HEIGHT: 67 IN | HEART RATE: 74 BPM | DIASTOLIC BLOOD PRESSURE: 70 MMHG | SYSTOLIC BLOOD PRESSURE: 130 MMHG | BODY MASS INDEX: 39.24 KG/M2 | OXYGEN SATURATION: 98 %

## 2021-01-22 DIAGNOSIS — G20 PARKINSONISM, UNSPECIFIED PARKINSONISM TYPE (HCC): ICD-10-CM

## 2021-01-22 DIAGNOSIS — I10 ESSENTIAL (PRIMARY) HYPERTENSION: ICD-10-CM

## 2021-01-22 DIAGNOSIS — E03.9 HYPOTHYROIDISM, UNSPECIFIED TYPE: ICD-10-CM

## 2021-01-22 DIAGNOSIS — E78.00 PURE HYPERCHOLESTEROLEMIA: ICD-10-CM

## 2021-01-22 DIAGNOSIS — Z00.00 MEDICARE ANNUAL WELLNESS VISIT, SUBSEQUENT: Primary | ICD-10-CM

## 2021-01-22 PROCEDURE — 99214 OFFICE O/P EST MOD 30 MIN: CPT | Performed by: INTERNAL MEDICINE

## 2021-01-22 PROCEDURE — G0439 PPPS, SUBSEQ VISIT: HCPCS | Performed by: INTERNAL MEDICINE

## 2021-01-22 RX ORDER — FLUOROMETHOLONE 0.1 %
SUSPENSION, DROPS(FINAL DOSAGE FORM)(ML) OPHTHALMIC (EYE)
COMMUNITY

## 2021-01-22 RX ORDER — CHLORHEXIDINE GLUCONATE 4 G/100ML
SOLUTION TOPICAL
COMMUNITY
End: 2021-03-29

## 2021-01-22 RX ORDER — CHLORHEXIDINE GLUCONATE 213 G/1000ML
SOLUTION TOPICAL
COMMUNITY
Start: 2020-11-20 | End: 2021-03-29 | Stop reason: ALTCHOICE

## 2021-01-22 NOTE — PROGRESS NOTES
The ABCs of the Annual Wellness Visit  Subsequent Medicare Wellness Visit    Chief Complaint   Patient presents with   • Medicare Wellness-subsequent       Subjective   History of Present Illness:  Javier San is a 75 y.o. male who presents for a Subsequent Medicare Wellness Visit.    The following data was reviewed by: Lainey Gaffney MD on 01/22/2021:  CMP    CMP 5/28/20 7/10/20 1/15/21   Glucose 102 (A) 86 92   BUN 16 23 18   Creatinine 0.7 1.21 0.86   eGFR Non  Am  58 (A) 87   eGFR  Am  71 105   Sodium 136 (A) 142 139   Potassium 4.0 4.3 4.1   Chloride 107 102 101   Calcium 8.3 (A) 9.3 9.1   Total Protein  6.7 6.6   Albumin 3.2 (A) 4.50 4.30   Globulin  2.2 2.3   Total Bilirubin 0.6 0.7 0.7   Alkaline Phosphatase 57 119 (A) 100   AST (SGOT) 28 14 19   ALT (SGPT) 10 13 9   (A) Abnormal value       Comments are available for some flowsheets but are not being displayed.           CBC    CBC 5/28/20 7/10/20 1/15/21   WBC 9.22 11.64 (A) 6.64   RBC 4.20 (A) 4.55 4.81   Hemoglobin 13.5 14.2 15.7   Hematocrit 40.4 (A) 42.4 45.7   MCV 96.2 93.2 95.0   MCH 32.1 31.2 32.6   MCHC 33.4 33.5 34.4   RDW 13.2 13.6 13.0   Platelets 150 209 196   (A) Abnormal value            Lipid Panel    Lipid Panel 7/10/20 1/15/21   Total Cholesterol 169 128   Triglycerides 187 (A) 120   HDL Cholesterol 56 45   VLDL Cholesterol 37.4 22   LDL Cholesterol  76 61   (A) Abnormal value       Comments are available for some flowsheets but are not being displayed.           TSH    TSH 7/10/20 9/11/20 1/15/21   TSH 23.500 (A) 1.780 3.060   (A) Abnormal value            HTN.  Fair control with current.   HLD. Excellent control on atorvastatin.    Hypthyroidism. Control on  levothyroxine is good.   Parkinson's disease.  Followed by Dr. Ford   Disease has progressed.       HEALTH RISK ASSESSMENT    Recent Hospitalizations:  No hospitalization(s) within the last year.    Current Medical Providers:  Patient Care Team:  Yeimy  Lainey DAY MD as PCP - General  YeimyLainey hoang MD as PCP - Family Medicine  Jono Madden MD as Consulting Physician (Cardiology)  Jessica Garnica MD as Consulting Physician (Pulmonary Disease)    Smoking Status:  Social History     Tobacco Use   Smoking Status Former Smoker   • Packs/day: 1.00   • Types: Cigarettes   • Start date: 1964   • Quit date: 1984   • Years since quittin.6   Smokeless Tobacco Never Used       Alcohol Consumption:  Social History     Substance and Sexual Activity   Alcohol Use Yes    Comment: No caffeine use       Depression Screen:   PHQ-2/PHQ-9 Depression Screening 2021   Little interest or pleasure in doing things 0   Feeling down, depressed, or hopeless 0   Trouble falling or staying asleep, or sleeping too much -   Feeling tired or having little energy -   Poor appetite or overeating -   Feeling bad about yourself - or that you are a failure or have let yourself or your family down -   Trouble concentrating on things, such as reading the newspaper or watching television -   Moving or speaking so slowly that other people could have noticed. Or the opposite - being so fidgety or restless that you have been moving around a lot more than usual -   Thoughts that you would be better off dead, or of hurting yourself in some way -   Total Score 0   If you checked off any problems, how difficult have these problems made it for you to do your work, take care of things at home, or get along with other people? -       Fall Risk Screen:  STEADI Fall Risk Assessment was completed, and patient is at MODERATE risk for falls. Assessment completed on:2021    Health Habits and Functional and Cognitive Screening:  Functional & Cognitive Status 2021   Do you have difficulty preparing food and eating? No   Do you have difficulty bathing yourself, getting dressed or grooming yourself? No   Do you have difficulty using the toilet? No   Do you have difficulty moving around from  place to place? No   Do you have trouble with steps or getting out of a bed or a chair? No   Current Diet Well Balanced Diet   Dental Exam Up to date   Eye Exam Up to date   Exercise (times per week) 0 times per week   Current Exercise Activities Include None   Do you need help using the phone?  No   Are you deaf or do you have serious difficulty hearing?  No   Do you need help with transportation? No   Do you need help shopping? No   Do you need help preparing meals?  No   Do you need help with housework?  No   Do you need help with laundry? No   Do you need help taking your medications? No   Do you need help managing money? No   Do you ever drive or ride in a car without wearing a seat belt? No   Have you felt unusual stress, anger or loneliness in the last month? No   Who do you live with? Spouse   If you need help, do you have trouble finding someone available to you? No   Have you been bothered in the last four weeks by sexual problems? No   Do you have difficulty concentrating, remembering or making decisions? No         Does the patient have evidence of cognitive impairment? No    Asprin use counseling:Does not need ASA (and currently is not on it)    Age-appropriate Screening Schedule:  Refer to the list below for future screening recommendations based on patient's age, sex and/or medical conditions. Orders for these recommended tests are listed in the plan section. The patient has been provided with a written plan.    Health Maintenance   Topic Date Due   • ZOSTER VACCINE (2 of 3) 04/02/2010   • LIPID PANEL  01/15/2022   • COLONOSCOPY  05/06/2023   • TDAP/TD VACCINES (2 - Td) 06/23/2023   • INFLUENZA VACCINE  Completed          The following portions of the patient's history were reviewed and updated as appropriate: allergies, current medications, past family history, past medical history, past social history, past surgical history and problem list.    Outpatient Medications Prior to Visit   Medication Sig  Dispense Refill   • acetaminophen (TYLENOL) 325 MG tablet Take 650 mg by mouth Every 6 (Six) Hours As Needed for mild pain (1-3).     • albuterol (ACCUNEB) 1.25 MG/3ML nebulizer solution Take 1 ampule by nebulization Every 6 (Six) Hours As Needed for wheezing.     • albuterol (PROVENTIL HFA;VENTOLIN HFA) 108 (90 BASE) MCG/ACT inhaler Inhale 2 puffs Every 6 (Six) Hours As Needed. ProAir  (90 Base) MCG/ACT Inhalation Aerosol Solution; Patient Sig: ProAir  (90 Base) MCG/ACT Inhalation Aerosol Solution INHALE 1 TO 2 PUFFS EVERY 4 TO 6 HOURS AS NEEDED.; 0; 29-Sep-2014; Active     • atorvastatin (LIPITOR) 10 MG tablet TAKE 1 TABLET BY MOUTH EVERY DAY 90 tablet 1   • azelastine (ASTELIN) 0.1 % nasal spray 1 spray into each nostril 2 (Two) Times a Day. Use 1 to 2 sprays     • Breo Ellipta 200-25 MCG/INH inhaler INL 1 PUFF PO QD     • carbidopa-levodopa (SINEMET)  MG per tablet Take 1 tablet by mouth Every 3 (Three) Hours.     • chlorhexidine (Hibiclens) 4 % external liquid Hibiclens 4 % topical liquid   Wash back and gluteal region with Hibiclens soap the night before and morning of procedure     • Cholecalciferol (VITAMIN D) 2000 units tablet Take 2,000 Units by mouth Daily.     • Cyanocobalamin (B-12 PO) Take  by mouth.     • DULoxetine (CYMBALTA) 60 MG capsule TK 1 C PO BID     • fluorometholone (FML) 0.1 % ophthalmic suspension fluorometholone 0.1 % eye drops,suspension     • furosemide (Lasix) 40 MG tablet Take 1 tablet by mouth 2 (Two) Times a Day. 60 tablet 11   • Hibiclens 4 % external liquid WASH BACK AND GLUTEAL REGION WITH SOLUTION THE NIGHT BEFORE AND MORNING OF SURGERY.     • HYDROcodone-acetaminophen (NORCO) 7.5-325 MG per tablet TK 1 T PO Q DAY PRN  0   • Incruse Ellipta 62.5 MCG/INH aerosol powder  INL 1 PUFF PO D     • levothyroxine (SYNTHROID, LEVOTHROID) 150 MCG tablet Take 1 tablet by mouth Daily. 90 tablet 3   • Loratadine 10 MG capsule Take 10 mg by mouth Daily.     • losartan  (COZAAR) 50 MG tablet TAKE 1 TABLET BY MOUTH EVERY DAY 90 tablet 0   • melatonin 5 MG tablet tablet Take 5 mg by mouth At Night As Needed.     • montelukast (SINGULAIR) 10 MG tablet Take 10 mg by mouth Every Night.     • pregabalin (LYRICA) 200 MG capsule pregabalin 200 mg capsule   Take 1 capsule 3 times a day by oral route as directed for 30 days.     • cyclobenzaprine (FLEXERIL) 10 MG tablet Take 10 mg by mouth 3 (Three) Times a Day As Needed for Muscle Spasms.       No facility-administered medications prior to visit.        Patient Active Problem List   Diagnosis   • Atopic rhinitis   • Arthritis   • Asthma   • Degeneration of intervertebral disc of lumbar region   • Hypothyroidism   • Impaired fasting glucose   • Lumbar radiculopathy   • Spinal stenosis of lumbar region   • Lung nodule   • Obstructive sleep apnea syndrome   • History of melanoma   • Thyroid nodule   • Hyperlipemia   • Benign prostatic hyperplasia with lower urinary tract symptoms   • Parkinson disease (CMS/HCC)   • History of pacemaker   • Obesity due to excess calories without serious comorbidity   • Atrioventricular block, complete (CMS/HCC)   • Small fiber neuropathy   • Raynaud's disease without gangrene   • Labile blood pressure   • Essential (primary) hypertension   • Dysautonomia (CMS/HCC)   • Peripheral neuropathic pain   • Lumbar spondylosis       Advanced Care Planning:  ACP discussion was held with the patient during this visit. Patient has an advance directive (not in EMR), copy requested.    Review of Systems   Constitutional: Negative for fever.   Respiratory: Negative.    Cardiovascular: Negative.        Compared to one year ago, the patient feels his physical health is the same.  Compared to one year ago, the patient feels his mental health is the same.    Reviewed chart for potential of high risk medication in the elderly: yes  Reviewed chart for potential of harmful drug interactions in the elderly:yes    Objective        "  Vitals:    01/22/21 1333   BP: 130/70   Pulse: 74   SpO2: 98%   Weight: 113 kg (250 lb)   Height: 170.5 cm (67.13\")   PainSc: 0-No pain       Body mass index is 39.01 kg/m².  Discussed the patient's BMI with him. The BMI is above average; BMI management plan is completed.    Physical Exam  Constitutional:       Appearance: He is well-developed.   HENT:      Head: Normocephalic and atraumatic.      Right Ear: Hearing and tympanic membrane normal.      Left Ear: Hearing and tympanic membrane normal.      Mouth/Throat:      Pharynx: No posterior oropharyngeal erythema.   Neck:      Musculoskeletal: Neck supple.      Thyroid: No thyromegaly.   Cardiovascular:      Rate and Rhythm: Normal rate and regular rhythm.      Heart sounds: Normal heart sounds. No murmur.   Pulmonary:      Effort: Pulmonary effort is normal.      Breath sounds: Normal breath sounds.   Abdominal:      General: There is no distension.      Palpations: Abdomen is soft.      Tenderness: There is no abdominal tenderness.   Lymphadenopathy:      Cervical: No cervical adenopathy.   Skin:     General: Skin is warm and dry.   Neurological:      Mental Status: He is alert and oriented to person, place, and time.      Gait: Gait abnormal.   Psychiatric:         Speech: Speech normal.         Behavior: Behavior normal.         Thought Content: Thought content normal.         Judgment: Judgment normal.         Lab Results   Component Value Date    GLU 92 01/15/2021    CHLPL 128 01/15/2021    TRIG 120 01/15/2021    HDL 45 01/15/2021    LDL 61 01/15/2021    VLDL 22 01/15/2021        Assessment/Plan   Medicare Risks and Personalized Health Plan  CMS Preventative Services Quick Reference  Immunizations Discussed/Encouraged (specific immunizations; Shingrix )    The above risks/problems have been discussed with the patient.  Pertinent information has been shared with the patient in the After Visit Summary.  Follow up plans and orders are seen below in the " Assessment/Plan Section.    Diagnoses and all orders for this visit:    1. Medicare annual wellness visit, subsequent (Primary)    2. Essential (primary) hypertension    3. Pure hypercholesterolemia    4. Hypothyroidism, unspecified type    5. Parkinsonism, unspecified Parkinsonism type (CMS/HCC)    HTN. The patient is advised to continue current dosage of losartan.  HLD.  The patient is advised to continue current dosage of atorvastatin.    Hypothyroidism.  The patient is advised to continue current dosage of levothyroxine.        Follow Up:  Return in about 6 months (around 7/22/2021) for Recheck.     An After Visit Summary and PPPS were given to the patient.

## 2021-01-22 NOTE — PATIENT INSTRUCTIONS

## 2021-03-05 NOTE — PROGRESS NOTES
RM:________     PCP: Lainey Gaffney MD    : 1945  AGE: 75 y.o.  EST PATIENT   REASON FOR VISIT/  CC:    BP Readings from Last 3 Encounters:   21 130/70   20 121/73   20 112/70        WT: ____________ BP: __________L __________R HR______    CHEST PAIN: _____________    SOA: _____________PALPS: _______________     LIGHTHEADED: ___________FATIGUE: ________________ EDEMA __________    ALLERGIES:Levofloxacin and Moxifloxacin SMOKING HISTORY:  Social History     Tobacco Use   • Smoking status: Former Smoker     Packs/day: 1.00     Types: Cigarettes     Start date: 1964     Quit date: 1984     Years since quittin.7   • Smokeless tobacco: Never Used   Substance Use Topics   • Alcohol use: Yes     Comment: No caffeine use   • Drug use: No     CAFFEINE USE_________________  ALCOHOL ______________________    Below is the patient's most recent value for Albumin, ALT, AST, BUN, Calcium, Chloride, Cholesterol, CO2, Creatinine, GFR, Glucose, HDL, Hematocrit, Hemoglobin, Hemoglobin A1C, LDL, Magnesium, Phosphorus, Platelets, Potassium, PSA, Sodium, Triglycerides, TSH and WBC.   Lab Results   Component Value Date    ALBUMIN 4.30 01/15/2021    ALT 9 01/15/2021    AST 19 01/15/2021    BUN 18 01/15/2021    CALCIUM 9.1 01/15/2021     01/15/2021    CO2 27.3 01/15/2021    CREATININE 0.86 01/15/2021    GLU 92 01/15/2021    HDL 45 01/15/2021    HCT 45.7 01/15/2021    HGB 15.7 01/15/2021    HGBA1C 5.9 (H) 2020    LDL 61 01/15/2021    MG 2.3 2016     01/15/2021    K 4.1 01/15/2021     01/15/2021    TRIG 120 01/15/2021    TSH 3.060 01/15/2021    WBC 6.64 01/15/2021          NEW DIAGNOSIS/ SURGERY/ HOSP OR ED VISITS: ______________________    __________________________________________________________________      RECENT LABS OR DIAGNOSTIC TESTING:  _____________________________    __________________________________________________________________      ASSESSMENT/ PLAN:  _______________________________________________    __________________________________________________________________

## 2021-03-09 DIAGNOSIS — Z23 IMMUNIZATION DUE: ICD-10-CM

## 2021-03-15 RX ORDER — LOSARTAN POTASSIUM 50 MG/1
TABLET ORAL
Qty: 90 TABLET | Refills: 0 | Status: SHIPPED | OUTPATIENT
Start: 2021-03-15 | End: 2021-06-07

## 2021-03-29 ENCOUNTER — OFFICE VISIT (OUTPATIENT)
Dept: CARDIOLOGY | Facility: CLINIC | Age: 76
End: 2021-03-29

## 2021-03-29 VITALS
BODY MASS INDEX: 40.49 KG/M2 | WEIGHT: 258 LBS | HEART RATE: 71 BPM | DIASTOLIC BLOOD PRESSURE: 64 MMHG | SYSTOLIC BLOOD PRESSURE: 126 MMHG | HEIGHT: 67 IN

## 2021-03-29 DIAGNOSIS — I44.2 ATRIOVENTRICULAR BLOCK, COMPLETE (HCC): ICD-10-CM

## 2021-03-29 DIAGNOSIS — G20 PARKINSON DISEASE (HCC): ICD-10-CM

## 2021-03-29 DIAGNOSIS — I10 ESSENTIAL (PRIMARY) HYPERTENSION: Primary | ICD-10-CM

## 2021-03-29 DIAGNOSIS — G90.1 DYSAUTONOMIA (HCC): ICD-10-CM

## 2021-03-29 DIAGNOSIS — Z95.0 HISTORY OF PACEMAKER: ICD-10-CM

## 2021-03-29 DIAGNOSIS — R09.89 LABILE BLOOD PRESSURE: ICD-10-CM

## 2021-03-29 DIAGNOSIS — G62.9 SMALL FIBER NEUROPATHY: ICD-10-CM

## 2021-03-29 PROCEDURE — 99213 OFFICE O/P EST LOW 20 MIN: CPT | Performed by: INTERNAL MEDICINE

## 2021-03-29 PROCEDURE — 93000 ELECTROCARDIOGRAM COMPLETE: CPT | Performed by: INTERNAL MEDICINE

## 2021-03-29 RX ORDER — CARBIDOPA AND LEVODOPA 50; 200 MG/1; MG/1
1 TABLET, EXTENDED RELEASE ORAL NIGHTLY
COMMUNITY

## 2021-03-29 NOTE — PROGRESS NOTES
Date of Office Visit: 2021  Encounter Provider: Jono Madden MD  Place of Service: [facility] CARDIOLOGY  Patient Name: Javier San  :1945    Chief Complaint   Patient presents with   • Irregular Heart Beat   :     HPI: Javier San is a 75 y.o. male who presents today to follow up. I have reviewed prior notes and there are no changes except for any new updates described below. I have also reviewed any information entered into the medical record by the patient or by ancillary staff.     He has a history of near syncope and labile hypertension with orthostasis.  In 2016, he presented to the ER with bradycardia/Mobitz II heart block, and had a dual chamber [employer] pacemaker placed. He has not had syncope, although he does still have intermittent lightheadedness (extremely brief, seconds long episodes). He also has very labile blood pressures, which can be high or low. Unfortunately, he has Parkinsonism and small fiber neuropathy, which is the underlying cause of his BP lability.    In 2019, an echo revealed normal LVSF, wall motion abnormalities consistent with RV pacing, and grade IA diastolic dysfunction with normal RVSP.    He has chronic leg swelling.  He denies orthopnea or PND.  He is chronically short of breath, but his mobility is extremely limited from musculoskeletal causes.      Past Medical History:   Diagnosis Date   • Abnormal blood chemistry    • Abnormal liver enzymes    • Acute bacterial prostatitis    • Anemia    • Anxiety 2014    Warning,Lainey   • Arthritis    • Asthma 3/1/2016   • Basal cell carcinoma    • Chronic bronchitis (CMS/HCC) 3/1/2016   • Gynecomastia    • H/O degenerative disc disease    • H/O hernia repair    • Hypertension 3/1/2016    Impression: 2015 - .  Impression: 2014 - Controlled with HCTZ.;    • Hypokalemia    • Hypothyroidism    • Labile hypertension 2016    with periods of  orthostasis and near syncope   • Left bundle branch block    • Low back pain    • Lumbar radiculopathy    • Lumbar stenosis    • Lung nodule     CT of chest 2/9/15   • Malignant neoplasm of cheek (CMS/HCC) 3/1/2016    Description: - Basal Cell   • Mobitz (type) II atrioventricular block     s/p [employer] dual chamber PPM    • Obstructive sleep apnea    • Parkinson disease (CMS/HCC)    • Rupture of kidney     in high school football   • Skin cancer of nose     Basal Cell   • Stricture of ureter     WITH HYDRONEPHROSIS   • Testicular pain    • Venous insufficiency    • Vitamin D deficiency        Past Surgical History:   Procedure Laterality Date   • BACK SURGERY      L4-L5 with Dr. Solomon   • CARDIAC ELECTROPHYSIOLOGY PROCEDURE N/A 2016    Procedure: Pacemaker DC new  [place];  Surgeon: Zachary Narayanan MD;  Location: [facility] INVASIVE LOCATION;  Service:    • COLON SURGERY      Complete colonoscopy- Dr. knox/[facility]   • CYSTOSCOPY TRANSURETHRAL RESECTION OF PROSTATE     • CYSTOSCOPY TRANSURETHRAL RESECTION OF PROSTATE N/A 2016    Procedure: CYSTOSCOPY TRANSURETHRAL RESECTION OF PROSTATE;  Surgeon: Law Dee MD;  Location: [facility] OR;  Service:    • HERNIA REPAIR     • NASAL SEPTUM SURGERY     • NOSE SURGERY      deviated septum   • PROSTATE SURGERY      transurethral destruction prostate tissue   • REPLACEMENT TOTAL KNEE BILATERAL      right in  and left in    • TONSILLECTOMY     • TOTAL SHOULDER REPLACEMENT Bilateral     left in  and right in        Social History     Socioeconomic History   • Marital status:      Spouse name: Not on file   • Number of children: Not on file   • Years of education: Not on file   • Highest education level: Not on file   Tobacco Use   • Smoking status: Former Smoker     Packs/day: 1.00     Types: Cigarettes     Start date: 1964     Quit date: 1984     Years since quittin.8   • Smokeless tobacco: Never Used   Vaping Use   •  Vaping Use: Never used   Substance and Sexual Activity   • Alcohol use: Yes     Comment: No caffeine use   • Drug use: No   • Sexual activity: Defer       Family History   Problem Relation Age of Onset   • Alcohol abuse Mother    • Cancer Mother         skin   • Stroke Mother    • Alcohol abuse Father    • Emphysema Father    • COPD Father    • Cancer Father         skin       Review of Systems   Constitutional: Positive for weight gain.   Cardiovascular: Positive for leg swelling. Negative for chest pain and palpitations.   Respiratory: Positive for shortness of breath.    Musculoskeletal: Positive for back pain and myalgias.   Neurological: Positive for loss of balance, numbness, paresthesias and tremors.        Nerve pain   All other systems reviewed and are negative.      Allergies   Allergen Reactions   • Levofloxacin Myalgia   • Moxifloxacin Other (See Comments)         Current Outpatient Medications:   •  acetaminophen (TYLENOL) 325 MG tablet, Take 650 mg by mouth Every 6 (Six) Hours As Needed for mild pain (1-3)., Disp: , Rfl:   •  albuterol (ACCUNEB) 1.25 MG/3ML nebulizer solution, Take 1 ampule by nebulization Every 6 (Six) Hours As Needed for wheezing., Disp: , Rfl:   •  albuterol (PROVENTIL HFA;VENTOLIN HFA) 108 (90 BASE) MCG/ACT inhaler, Inhale 2 puffs Every 6 (Six) Hours As Needed. ProAir  (90 Base) MCG/ACT Inhalation Aerosol Solution; Patient Sig: ProAir  (90 Base) MCG/ACT Inhalation Aerosol Solution INHALE 1 TO 2 PUFFS EVERY 4 TO 6 HOURS AS NEEDED.; 0; 29-Sep-2014; Active, Disp: , Rfl:   •  atorvastatin (LIPITOR) 10 MG tablet, TAKE 1 TABLET BY MOUTH EVERY DAY, Disp: 90 tablet, Rfl: 1  •  azelastine (ASTELIN) 0.1 % nasal spray, 1 spray into each nostril 2 (Two) Times a Day. Use 1 to 2 sprays, Disp: , Rfl:   •  Breo Ellipta 200-25 MCG/INH inhaler, INL 1 PUFF PO QD, Disp: , Rfl:   •  carbidopa-levodopa (SINEMET)  MG per tablet, Take 1 tablet by mouth Every 3 (Three) Hours., Disp: ,  "Rfl:   •  carbidopa-levodopa CR (SINEMET CR)  MG per CR tablet, Take 1 tablet by mouth Every Night., Disp: , Rfl:   •  Cholecalciferol (VITAMIN D) 2000 units tablet, Take 2,000 Units by mouth Daily., Disp: , Rfl:   •  Cyanocobalamin (B-12 PO), Take  by mouth., Disp: , Rfl:   •  DULoxetine (CYMBALTA) 60 MG capsule, TK 1 C PO BID, Disp: , Rfl:   •  fluorometholone (FML) 0.1 % ophthalmic suspension, fluorometholone 0.1 % eye drops,suspension, Disp: , Rfl:   •  furosemide (Lasix) 40 MG tablet, Take 1 tablet by mouth 2 (Two) Times a Day., Disp: 60 tablet, Rfl: 11  •  HYDROcodone-acetaminophen (NORCO) 7.5-325 MG per tablet, TK 1 T PO Q DAY PRN, Disp: , Rfl: 0  •  Incruse Ellipta 62.5 MCG/INH aerosol powder , INL 1 PUFF PO D, Disp: , Rfl:   •  levothyroxine (SYNTHROID, LEVOTHROID) 150 MCG tablet, Take 1 tablet by mouth Daily., Disp: 90 tablet, Rfl: 3  •  Loratadine 10 MG capsule, Take 10 mg by mouth Daily., Disp: , Rfl:   •  losartan (COZAAR) 50 MG tablet, TAKE 1 TABLET BY MOUTH EVERY DAY, Disp: 90 tablet, Rfl: 0  •  melatonin 5 MG tablet tablet, Take 5 mg by mouth At Night As Needed., Disp: , Rfl:   •  montelukast (SINGULAIR) 10 MG tablet, Take 10 mg by mouth Every Night., Disp: , Rfl:   •  pregabalin (LYRICA) 200 MG capsule, pregabalin 200 mg capsule  Take 1 capsule 3 times a day by oral route as directed for 30 days., Disp: , Rfl:      Objective:     Vitals:    03/29/21 1207   BP: 126/64   BP Location: Left arm   Pulse: 71   Weight: 117 kg (258 lb)   Height: 170.5 cm (67.13\")     Body mass index is 40.25 kg/m².    Physical Exam  Vitals reviewed.   Constitutional:       Comments: Obese   HENT:      Head: Normocephalic.      Nose: Nose normal.      Mouth/Throat:      Comments: Masked  Eyes:      Conjunctiva/sclera: Conjunctivae normal.      Pupils: Pupils are equal, round, and reactive to light.   Neck:      Vascular: No JVD.   Cardiovascular:      Rate and Rhythm: Normal rate and regular rhythm.      Pulses: Intact " distal pulses.      Heart sounds: Normal heart sounds. No murmur heard.     Pulmonary:      Effort: Pulmonary effort is normal.      Breath sounds: Normal breath sounds.   Abdominal:      Palpations: Abdomen is soft.      Tenderness: There is no abdominal tenderness.      Comments: Obesity limits abdominal exam   Musculoskeletal:         General: Normal range of motion.      Cervical back: Normal range of motion.   Skin:     General: Skin is warm and dry.      Findings: No rash.   Neurological:      Mental Status: He is alert and oriented to person, place, and time.      Cranial Nerves: No cranial nerve deficit.      Motor: Tremor present.      Comments: Right hand tremor   Psychiatric:         Mood and Affect: Mood normal.         Behavior: Behavior normal.         Thought Content: Thought content normal.           ECG 12 Lead    Date/Time: 3/29/2021 12:37 PM  Performed by: Jono Madden MD  Authorized by: Jono Madden MD   Comparison: compared with previous ECG   Similar to previous ECG  Rhythm comments: NSR/A-sensed, V-paced  Conduction: non-specific intraventricular conduction delay  Other: no other findings    Clinical impression: abnormal EKG              Assessment:       Diagnosis Plan   1. Essential (primary) hypertension     2. Labile blood pressure     3. Dysautonomia (CMS/HCC)     4. Small fiber neuropathy     5. Parkinson disease (CMS/HCC)     6. Atrioventricular block, complete (CMS/HCC)     7. History of pacemaker            Plan:       1-5.  He has a history of labile hypertension.  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  His BP is within goal today.  He hasn't had syncope so we'll keep things where they are for now.    6/7.  He has a history of high grade AVB and is now s/p dual chamber PPM placement.  He paces in the % of the time.  He is doing well.    Sincerely,       Jono Madden MD

## 2021-06-07 RX ORDER — LOSARTAN POTASSIUM 50 MG/1
TABLET ORAL
Qty: 90 TABLET | Refills: 3 | Status: SHIPPED | OUTPATIENT
Start: 2021-06-07

## 2021-06-07 RX ORDER — ATORVASTATIN CALCIUM 10 MG/1
TABLET, FILM COATED ORAL
Qty: 90 TABLET | Refills: 1 | Status: SHIPPED | OUTPATIENT
Start: 2021-06-07

## 2021-06-24 ENCOUNTER — APPOINTMENT (OUTPATIENT)
Dept: VASCULAR SURGERY | Facility: HOSPITAL | Age: 76
End: 2021-06-24

## 2021-06-24 ENCOUNTER — APPOINTMENT (OUTPATIENT)
Dept: CARDIOLOGY | Facility: HOSPITAL | Age: 76
End: 2021-06-24

## 2021-07-11 RX ORDER — FUROSEMIDE 40 MG/1
TABLET ORAL
Qty: 60 TABLET | Refills: 11 | Status: CANCELLED | OUTPATIENT
Start: 2021-07-11

## 2021-07-11 RX ORDER — FUROSEMIDE 40 MG/1
TABLET ORAL
Qty: 60 TABLET | Refills: 11 | Status: SHIPPED | OUTPATIENT
Start: 2021-07-11 | End: 2021-08-09

## 2021-08-09 RX ORDER — FUROSEMIDE 40 MG/1
TABLET ORAL
Qty: 60 TABLET | Refills: 11 | Status: SHIPPED | OUTPATIENT
Start: 2021-08-09

## 2021-08-09 RX ORDER — LEVOTHYROXINE SODIUM 0.15 MG/1
150 TABLET ORAL DAILY
Qty: 90 TABLET | Refills: 3 | Status: SHIPPED | OUTPATIENT
Start: 2021-08-09 | End: 2022-07-18

## 2021-09-20 ENCOUNTER — TELEPHONE (OUTPATIENT)
Dept: CARDIOLOGY | Facility: CLINIC | Age: 76
End: 2021-09-20

## 2021-09-20 NOTE — TELEPHONE ENCOUNTER
FYI, 1 ATR on 5/24 lasting 54 min 55 sec. EGM appears to be true AT/AF. Avg V rate 70bpm. [place] <1%, no AC. Presenting rhythm AS/ 70's

## 2021-09-20 NOTE — TELEPHONE ENCOUNTER
Given low burden, and given Parkinson's and high falls risk, will not start AC.  He has an appt with TK in ten days.    Ulysses pedersen

## 2022-02-18 ENCOUNTER — TELEPHONE (OUTPATIENT)
Dept: INTERNAL MEDICINE | Facility: CLINIC | Age: 77
End: 2022-02-18

## 2022-07-18 RX ORDER — LEVOTHYROXINE SODIUM 0.15 MG/1
150 TABLET ORAL DAILY
Qty: 90 TABLET | Refills: 3 | Status: SHIPPED | OUTPATIENT
Start: 2022-07-18

## 2022-09-25 ENCOUNTER — NURSE TRIAGE (OUTPATIENT)
Dept: OTHER | Facility: CLINIC | Age: 77
End: 2022-09-25

## 2022-09-25 NOTE — TELEPHONE ENCOUNTER
Received call from Kirk Snyder with [facility]. Subjective: Caller states \"he had back surgery three weeks ago and he has some weird reaction to the medication . He has been home since Monday and he has some times where he loses strength at time. He was weak yesterday at PT and a nurse had to help him and they decided that he was just to tired so he went home and slept. This morning he seems like he in pain but says he is not\"     Current Symptoms: weakness    Pain Severity: patient denies any    Temperature: denies     What has been tried: NA    Recommended disposition: go to ED now    Care advice provided, patient verbalizes understanding; denies any other questions or concerns. Outcome: Patient/caller agrees to proceed to . Emergency Department. Courtesy call placed to, Deb on-call provider.     Reason for Disposition   Difficulty breathing    Protocols used: Weakness (Generalized) and Fatigue-ADULT-

## 2023-01-03 ENCOUNTER — NURSE TRIAGE (OUTPATIENT)
Dept: OTHER | Facility: CLINIC | Age: 78
End: 2023-01-03

## 2023-01-21 ENCOUNTER — NURSE TRIAGE (OUTPATIENT)
Dept: OTHER | Facility: CLINIC | Age: 78
End: 2023-01-21

## 2023-01-21 NOTE — TELEPHONE ENCOUNTER
Location of patient: [facility] [address] call from [address] [facility] ([facility]) with [facility]. Wife, Loretta Diana, is calling, patient is nearby. Campbell Yun MRN: [id number]    Subjective: Caller states \"covid positive, in assisted living\"     Current Symptoms: sore throat, hoarse, feels terrible, achy, unsure if he has fever, nasal congestion, intermittent headache Does have COPD. Associated Symptoms: reduced activity, slow urination    Pain Severity: 6-7/10 on lower back, chronic issue; burning, stabbing; constant    Temperature: they do not know if he has a fever, staff checked, but did not tell him, wife states he feels hot to her by parent's tactile estimate    What has been tried: nothing    Recommended disposition: Call PCP within 24 hours    Care advice provided, patient verbalizes understanding; denies any other questions or concerns. Outcome:  Writer placed a call to on-call PCP, Dr Leeanna Edward at [phone] and left the patient's wife callback number .  Writer informed patient's wife that if she did not receive a callback within 10 minutes to proceed with patient to [facility]/ED      This triage is a result of a call to the [address]         Reason for Disposition   [1] HIGH RISK for severe COVID complications (e.g., weak immune system, age > 59 years, obesity with BMI 30 or higher, pregnant, chronic lung disease or other chronic medical condition) AND [2] COVID symptoms (e.g., cough, fever)  (Exceptions: Already seen by PCP and no new or worsening symptoms.)    Protocols used: Coronavirus (COVID-19) Diagnosed or Suspected-ADULT-

## 2023-04-19 ENCOUNTER — NURSE TRIAGE (OUTPATIENT)
Dept: OTHER | Facility: CLINIC | Age: 78
End: 2023-04-19

## 2023-04-19 NOTE — TELEPHONE ENCOUNTER
Location of patient: [address] [facility] call from [place] with [facility]. Chula Saldana MRN: [id number]    Subjective: Caller states \"Went into for surgery and developed a clot in his leg and was put on a blood thinner to take x 2 daily for 3 months. He went to another appointment on March 1st and they took him off of it. Dad was in hospital for most of Feb. We just discovered that they took him off of it as of March 1st. I am unsure how urgent it is to restart it because he does have blood clot. Patient saw his hematologist today that states that he should still be on it. He always has swollen legs. All Vital signs are fine. \"     Current Symptoms: none     Associated Symptoms: NA    Pain Severity: 0/10; N/A; none    Temperature: n/a by unknown method    What has been tried: n/a    Recommended disposition:  Call PCP when Office is Open    Care advice provided, patient verbalizes understanding; denies any other questions or concerns.     Outcome: Patient/caller agrees to follow-up with PCP       This triage is a result of a call to the [address]       Reason for Disposition   [1] Caller has NON-URGENT medicine question about med that PCP prescribed AND [2] triager unable to answer question    Protocols used: Medication Question Call-ADULT-

## 2023-04-26 ENCOUNTER — NURSE TRIAGE (OUTPATIENT)
Dept: OTHER | Facility: CLINIC | Age: 78
End: 2023-04-26

## 2023-04-27 NOTE — TELEPHONE ENCOUNTER
Location of patient: [address] call from [place] with [facility]. Gurinder Kolb MRN: [id number]    Subjective: Caller states \"neuropathy- pain increased \"     Current Symptoms:   hx parkinsons   Hx fiber neuropathy   States has tingling and pain all the time and states it gets better  but it has worsened since the last couple weeks   Denies CP or SOB   Denies weight loss     Associated Symptoms: reduced activity    Pain Severity: 8/10; tingling, burning; constant    Temperature: Denies fever      What has been tried: hydrocodone and muscle relaxant, THC    Recommended disposition: See HCP within 4 Hours (or PCP triage)    Care advice provided, patient verbalizes understanding; denies any other questions or concerns. Outcome: Patient/caller agrees to proceed to nearest Emergency Department. Courtesy call placed to, Dr. Ritta Dance, on-call provider.       This triage is a result of a call to the [address] [facility]      Reason for Disposition   [1] SEVERE pain (e.g., excruciating, unable to do any normal activities) AND [2] not improved 2 hours after pain medicine    Protocols used: Muscle Aches and Body Pain-ADULT-